# Patient Record
Sex: FEMALE | Race: WHITE | URBAN - METROPOLITAN AREA
[De-identification: names, ages, dates, MRNs, and addresses within clinical notes are randomized per-mention and may not be internally consistent; named-entity substitution may affect disease eponyms.]

---

## 2017-01-16 RX ORDER — POTASSIUM CHLORIDE 20 MEQ/1
20 TABLET, EXTENDED RELEASE ORAL 2 TIMES DAILY
Qty: 60 TABLET | Refills: 5 | Status: SHIPPED | OUTPATIENT
Start: 2017-01-16 | End: 2017-07-14 | Stop reason: SDUPTHER

## 2017-03-17 RX ORDER — PRAVASTATIN SODIUM 20 MG
20 TABLET ORAL DAILY
Qty: 90 TABLET | Refills: 1 | Status: SHIPPED | OUTPATIENT
Start: 2017-03-17 | End: 2017-09-11 | Stop reason: SDUPTHER

## 2017-03-17 RX ORDER — VENLAFAXINE HYDROCHLORIDE 75 MG/1
75 CAPSULE, EXTENDED RELEASE ORAL DAILY
Qty: 30 CAPSULE | Refills: 5 | Status: SHIPPED | OUTPATIENT
Start: 2017-03-17 | End: 2017-09-11 | Stop reason: SDUPTHER

## 2017-04-11 ENCOUNTER — OFFICE VISIT (OUTPATIENT)
Dept: INTERNAL MEDICINE CLINIC | Age: 47
End: 2017-04-11

## 2017-04-11 VITALS
RESPIRATION RATE: 12 BRPM | DIASTOLIC BLOOD PRESSURE: 73 MMHG | BODY MASS INDEX: 35.5 KG/M2 | HEIGHT: 61 IN | HEART RATE: 70 BPM | SYSTOLIC BLOOD PRESSURE: 115 MMHG | WEIGHT: 188 LBS

## 2017-04-11 DIAGNOSIS — E78.5 HYPERLIPIDEMIA, UNSPECIFIED HYPERLIPIDEMIA TYPE: ICD-10-CM

## 2017-04-11 DIAGNOSIS — R73.01 IMPAIRED FASTING BLOOD SUGAR: ICD-10-CM

## 2017-04-11 DIAGNOSIS — I10 BENIGN ESSENTIAL HTN: Primary | ICD-10-CM

## 2017-04-11 DIAGNOSIS — E66.9 OBESITY (BMI 30-39.9): ICD-10-CM

## 2017-04-11 DIAGNOSIS — E88.81 METABOLIC SYNDROME: ICD-10-CM

## 2017-04-11 DIAGNOSIS — F33.0 MAJOR DEPRESSIVE DISORDER, RECURRENT EPISODE, MILD (HCC): ICD-10-CM

## 2017-04-11 DIAGNOSIS — F41.9 ANXIETY: ICD-10-CM

## 2017-04-11 PROCEDURE — 99214 OFFICE O/P EST MOD 30 MIN: CPT | Performed by: INTERNAL MEDICINE

## 2017-04-12 LAB
A/G RATIO: 1.4 (ref 1.1–2.2)
ALBUMIN SERPL-MCNC: 4.2 G/DL (ref 3.4–5)
ALP BLD-CCNC: 52 U/L (ref 40–129)
ALT SERPL-CCNC: 18 U/L (ref 10–40)
ANION GAP SERPL CALCULATED.3IONS-SCNC: 15 MMOL/L (ref 3–16)
AST SERPL-CCNC: 18 U/L (ref 15–37)
BILIRUB SERPL-MCNC: 0.5 MG/DL (ref 0–1)
BUN BLDV-MCNC: 12 MG/DL (ref 7–20)
CALCIUM SERPL-MCNC: 9.4 MG/DL (ref 8.3–10.6)
CHLORIDE BLD-SCNC: 99 MMOL/L (ref 99–110)
CHOLESTEROL, TOTAL: 219 MG/DL (ref 0–199)
CO2: 26 MMOL/L (ref 21–32)
CREAT SERPL-MCNC: 0.6 MG/DL (ref 0.6–1.1)
ESTIMATED AVERAGE GLUCOSE: 119.8 MG/DL
GFR AFRICAN AMERICAN: >60
GFR NON-AFRICAN AMERICAN: >60
GLOBULIN: 2.9 G/DL
GLUCOSE BLD-MCNC: 97 MG/DL (ref 70–99)
HBA1C MFR BLD: 5.8 %
HDLC SERPL-MCNC: 49 MG/DL (ref 40–60)
LDL CHOLESTEROL CALCULATED: 138 MG/DL
POTASSIUM SERPL-SCNC: 3.9 MMOL/L (ref 3.5–5.1)
SODIUM BLD-SCNC: 140 MMOL/L (ref 136–145)
TOTAL PROTEIN: 7.1 G/DL (ref 6.4–8.2)
TRIGL SERPL-MCNC: 162 MG/DL (ref 0–150)
VLDLC SERPL CALC-MCNC: 32 MG/DL

## 2017-07-14 RX ORDER — POTASSIUM CHLORIDE 20 MEQ/1
20 TABLET, EXTENDED RELEASE ORAL 2 TIMES DAILY
Qty: 60 TABLET | Refills: 5 | Status: SHIPPED | OUTPATIENT
Start: 2017-07-14 | End: 2018-01-12 | Stop reason: SDUPTHER

## 2017-09-11 RX ORDER — VENLAFAXINE HYDROCHLORIDE 75 MG/1
75 CAPSULE, EXTENDED RELEASE ORAL DAILY
Qty: 30 CAPSULE | Refills: 0 | Status: SHIPPED | OUTPATIENT
Start: 2017-09-11 | End: 2017-10-13 | Stop reason: SDUPTHER

## 2017-09-11 RX ORDER — PRAVASTATIN SODIUM 20 MG
20 TABLET ORAL DAILY
Qty: 90 TABLET | Refills: 0 | Status: SHIPPED | OUTPATIENT
Start: 2017-09-11 | End: 2017-12-08 | Stop reason: SDUPTHER

## 2017-10-16 RX ORDER — HYDROCHLOROTHIAZIDE 25 MG/1
25 TABLET ORAL DAILY
Qty: 90 TABLET | Refills: 3 | Status: SHIPPED | OUTPATIENT
Start: 2017-10-16 | End: 2018-10-15 | Stop reason: SDUPTHER

## 2017-10-16 RX ORDER — HYDROCHLOROTHIAZIDE 25 MG/1
TABLET ORAL
Qty: 30 TABLET | Refills: 11 | Status: SHIPPED | OUTPATIENT
Start: 2017-10-16 | End: 2017-10-18 | Stop reason: SDUPTHER

## 2017-10-18 ENCOUNTER — OFFICE VISIT (OUTPATIENT)
Dept: INTERNAL MEDICINE CLINIC | Age: 47
End: 2017-10-18

## 2017-10-18 VITALS
BODY MASS INDEX: 34.41 KG/M2 | HEART RATE: 74 BPM | RESPIRATION RATE: 12 BRPM | WEIGHT: 187 LBS | SYSTOLIC BLOOD PRESSURE: 121 MMHG | HEIGHT: 62 IN | DIASTOLIC BLOOD PRESSURE: 82 MMHG

## 2017-10-18 DIAGNOSIS — E88.81 METABOLIC SYNDROME: ICD-10-CM

## 2017-10-18 DIAGNOSIS — Z12.11 SCREEN FOR COLON CANCER: ICD-10-CM

## 2017-10-18 DIAGNOSIS — Z00.00 ANNUAL PHYSICAL EXAM: Primary | ICD-10-CM

## 2017-10-18 DIAGNOSIS — I10 BENIGN ESSENTIAL HTN: ICD-10-CM

## 2017-10-18 DIAGNOSIS — Z80.0 FAMILY HISTORY OF COLON CANCER: ICD-10-CM

## 2017-10-18 DIAGNOSIS — R73.01 IMPAIRED FASTING BLOOD SUGAR: ICD-10-CM

## 2017-10-18 DIAGNOSIS — E78.5 HYPERLIPIDEMIA, UNSPECIFIED HYPERLIPIDEMIA TYPE: ICD-10-CM

## 2017-10-18 LAB
A/G RATIO: 1.4 (ref 1.1–2.2)
ALBUMIN SERPL-MCNC: 4.2 G/DL (ref 3.4–5)
ALP BLD-CCNC: 58 U/L (ref 40–129)
ALT SERPL-CCNC: 19 U/L (ref 10–40)
ANION GAP SERPL CALCULATED.3IONS-SCNC: 15 MMOL/L (ref 3–16)
AST SERPL-CCNC: 22 U/L (ref 15–37)
BASOPHILS ABSOLUTE: 0 K/UL (ref 0–0.2)
BASOPHILS RELATIVE PERCENT: 0.6 %
BILIRUB SERPL-MCNC: 0.6 MG/DL (ref 0–1)
BUN BLDV-MCNC: 9 MG/DL (ref 7–20)
CALCIUM SERPL-MCNC: 9.2 MG/DL (ref 8.3–10.6)
CHLORIDE BLD-SCNC: 98 MMOL/L (ref 99–110)
CHOLESTEROL, TOTAL: 205 MG/DL (ref 0–199)
CO2: 27 MMOL/L (ref 21–32)
CREAT SERPL-MCNC: 0.5 MG/DL (ref 0.6–1.1)
EOSINOPHILS ABSOLUTE: 0.1 K/UL (ref 0–0.6)
EOSINOPHILS RELATIVE PERCENT: 1.1 %
GFR AFRICAN AMERICAN: >60
GFR NON-AFRICAN AMERICAN: >60
GLOBULIN: 3.1 G/DL
GLUCOSE BLD-MCNC: 85 MG/DL (ref 70–99)
HCT VFR BLD CALC: 38.1 % (ref 36–48)
HDLC SERPL-MCNC: 49 MG/DL (ref 40–60)
HEMOGLOBIN: 12.9 G/DL (ref 12–16)
LDL CHOLESTEROL CALCULATED: 120 MG/DL
LYMPHOCYTES ABSOLUTE: 2.7 K/UL (ref 1–5.1)
LYMPHOCYTES RELATIVE PERCENT: 42.4 %
MCH RBC QN AUTO: 31.1 PG (ref 26–34)
MCHC RBC AUTO-ENTMCNC: 33.9 G/DL (ref 31–36)
MCV RBC AUTO: 91.8 FL (ref 80–100)
MONOCYTES ABSOLUTE: 0.4 K/UL (ref 0–1.3)
MONOCYTES RELATIVE PERCENT: 7 %
NEUTROPHILS ABSOLUTE: 3.1 K/UL (ref 1.7–7.7)
NEUTROPHILS RELATIVE PERCENT: 48.9 %
PDW BLD-RTO: 13.1 % (ref 12.4–15.4)
PLATELET # BLD: 214 K/UL (ref 135–450)
PMV BLD AUTO: 8.9 FL (ref 5–10.5)
POTASSIUM SERPL-SCNC: 3.6 MMOL/L (ref 3.5–5.1)
RBC # BLD: 4.15 M/UL (ref 4–5.2)
SODIUM BLD-SCNC: 140 MMOL/L (ref 136–145)
TOTAL PROTEIN: 7.3 G/DL (ref 6.4–8.2)
TRIGL SERPL-MCNC: 179 MG/DL (ref 0–150)
TSH SERPL DL<=0.05 MIU/L-ACNC: 1.55 UIU/ML (ref 0.27–4.2)
VLDLC SERPL CALC-MCNC: 36 MG/DL
WBC # BLD: 6.4 K/UL (ref 4–11)

## 2017-10-18 PROCEDURE — 93000 ELECTROCARDIOGRAM COMPLETE: CPT | Performed by: INTERNAL MEDICINE

## 2017-10-18 PROCEDURE — 99396 PREV VISIT EST AGE 40-64: CPT | Performed by: INTERNAL MEDICINE

## 2017-10-18 RX ORDER — VENLAFAXINE HYDROCHLORIDE 75 MG/1
CAPSULE, EXTENDED RELEASE ORAL
Qty: 30 CAPSULE | Refills: 11 | Status: SHIPPED | OUTPATIENT
Start: 2017-10-18 | End: 2018-10-18 | Stop reason: SDUPTHER

## 2017-10-18 NOTE — PATIENT INSTRUCTIONS
exposed to the sun to reduce the risk of skin cancer. 2. Continue a healthy lifestyle including a healthy diet and increased aerobic exercise  3. Update your eye exam every 2 years  4. Always wear a seatbelt  5. Always wear a helmet when riding a bike or motorcycle    Here are a few  Reliable websites with a variety of health and wellness information:   www.mylifecheck. heart. org     www.nutritionsource. org     www. americanheart. org     www. diabetes. org      www.menopause. org     www.[facility]     www.[employer] ([facility] site)      RETURN SOONER WITH NEW PROBLEMS      Patient Education        Colon Cancer Screening: Care Instructions  Your Care Instructions    Colorectal cancer occurs in the colon or rectum. That's the lower part of your digestive system. It is the second-leading cause of cancer deaths in the [place]. It often starts with small growths called polyps in the colon or rectum. Polyps are usually found with screening tests. Depending on the type of test, any polyps found may be removed during the tests. Colorectal cancer usually does not cause symptoms at first. But regular tests can help find it early, before it spreads and becomes harder to treat. Experts advise routine tests for colon cancer for people starting at age 48. And they advise people with a higher risk of colon cancer to get tested sooner. Talk with your doctor about when you should start testing. Discuss which tests you need. Follow-up care is a key part of your treatment and safety. Be sure to make and go to all appointments, and call your doctor if you are having problems. It's also a good idea to know your test results and keep a list of the medicines you take. What are the main screening tests for colon cancer? · Stool tests. These include the fecal immunochemical test (FIT) and the fecal occult blood test (FOBT). These tests check stool samples for signs of cancer.  If your test is positive, you will need to have a colonoscopy. · Sigmoidoscopy. This test lets your doctor look at the lining of your rectum and the lowest part of your colon. Your doctor uses a lighted tube called a sigmoidoscope. This test can't find cancers or polyps in the upper part of your colon. In some cases, polyps that are found can be removed. But if your doctor finds polyps, you will need to have a colonoscopy to check the upper part of your colon. · Colonoscopy. This test lets your doctor look at the lining of your rectum and your entire colon. The doctor uses a thin, flexible tool called a colonoscope. It can also be used to remove polyps or get a tissue sample (biopsy). What tests do you need? The following guidelines are for people age 48 and over who are not at high risk for colorectal cancer. You may have at least one of these tests as directed by your doctor. · Fecal immunochemical test (FIT) or fecal occult blood test (FOBT) every year  · Sigmoidoscopy every 5 years  · Colonoscopy every 10 years  If you are age 68 to 80, you can work with your doctor to decide if screening is a good option. If you are age 80 or older, your doctor will likely advise that screening is not helpful. Talk with your doctor about when you need to be tested. And discuss which tests are right for you. Your doctor may recommend earlier or more frequent testing if you:  · Have had colorectal cancer before. · Have had colon polyps. · Have symptoms of colorectal cancer. These include blood in your stool and changes in your bowel habits. · Have a parent, brother or sister, or child with colon polyps or colorectal cancer. · Have a bowel disease. This includes ulcerative colitis and Crohn's disease. · Have a rare polyp syndrome that runs in families, such as familial adenomatous polyposis (FAP). · Have had radiation treatments to the belly or pelvis. When should you call for help?   Watch closely for changes in your health, and be sure to contact your doctor if:  · You have any changes in your bowel habits. · You have any problems. Where can you learn more? Go to https://chpepiceweb.Metagenics. org and sign in to your [employer] account. Enter 989 [id number] in the [facility] box to learn more about \"Colon Cancer Screening: Care Instructions. \"     If you do not have an account, please click on the \"Sign Up Now\" link. Current as of: May 3, 2017  Content Version: 11.3  © 5651-9239 [employer]nergos. Care instructions adapted under license by [facility] ([facility]). If you have questions about a medical condition or this instruction, always ask your healthcare professional. [address] any warranty or liability for your use of this information. Patient Education        DASH Diet: Care Instructions  Your Care Instructions  The DASH diet is an eating plan that can help lower your blood pressure. DASH stands for Dietary Approaches to Stop Hypertension. Hypertension is high blood pressure. The DASH diet focuses on eating foods that are high in calcium, potassium, and magnesium. These nutrients can lower blood pressure. The foods that are highest in these nutrients are fruits, vegetables, low-fat dairy products, nuts, seeds, and legumes. But taking calcium, potassium, and magnesium supplements instead of eating foods that are high in those nutrients does not have the same effect. The DASH diet also includes whole grains, fish, and poultry. The DASH diet is one of several lifestyle changes your doctor may recommend to lower your high blood pressure. Your doctor may also want you to decrease the amount of sodium in your diet. Lowering sodium while following the DASH diet can lower blood pressure even further than just the DASH diet alone. Follow-up care is a key part of your treatment and safety. Be sure to make and go to all appointments, and call your doctor if you are having problems.  It's also a good idea to know your test results following:  ¨ Make it a goal to eat a fruit or vegetable at every meal and at snacks. This will make it easy to get the recommended amount of fruits and vegetables each day. ¨ Try yogurt topped with fruit and nuts for a snack or healthy dessert. ¨ Add lettuce, tomato, cucumber, and onion to sandwiches. ¨ Combine a ready-made pizza crust with low-fat mozzarella cheese and lots of vegetable toppings. Try using tomatoes, squash, spinach, broccoli, carrots, cauliflower, and onions. ¨ Have a variety of cut-up vegetables with a low-fat dip as an appetizer instead of chips and dip. ¨ Sprinkle sunflower seeds or chopped almonds over salads. Or try adding chopped walnuts or almonds to cooked vegetables. ¨ Try some vegetarian meals using beans and peas. Add garbanzo or kidney beans to salads. Make burritos and tacos with mashed marinelli beans or black beans. Where can you learn more? Go to https://Mc4peYouLike.PIE Software. org and sign in to your [employer] account. Enter Z309 in the [employer] box to learn more about \"DASH Diet: Care Instructions. \"     If you do not have an account, please click on the \"Sign Up Now\" link. Current as of: April 3, 2017  Content Version: 11.3  © 0161-7478 WeFi. Care instructions adapted under license by [facility] ([facility]). If you have questions about a medical condition or this instruction, always ask your healthcare professional. [place] [address] any warranty or liability for your use of this information. Patient Education        Diet and Exercise for Metabolic Syndrome: Care Instructions  Your Care Instructions  Metabolic syndrome is the name for a group of health problems. It includes having too much fat around your waist and high blood pressure. It also includes high triglycerides, high blood sugar, and low levels of healthy (HDL) cholesterol.  These problems make it more likely you will have a heart attack or stroke or get diabetes. Your family history (your genes) can cause metabolic syndrome. So can unhealthy eating habits and not getting enough exercise. You can help lower your risk of heart attack, stroke, and diabetes if you eat healthy foods and get more exercise. It may be hard to make these lifestyle changes. But even small changes can help. Follow-up care is a key part of your treatment and safety. Be sure to make and go to all appointments, and call your doctor if you are having problems. It's also a good idea to know your test results and keep a list of the medicines you take. How can you care for yourself at home? Eat more fruits and vegetables  · Fruits and vegetables have nutrients to help protect you from heart disease and high blood pressure. They are low in fat and high in fiber. Dark green, orange, and yellow ones are the healthiest.  · Keep lots of vegetables ready for snacks. · Buy fruit that is in season. Then put it where you can see it so you will want to eat it. · Cook dishes that have a lot of vegetables. Soups and stir-fries are good choices. Limit saturated and trans fats  · Read food labels, and try to avoid saturated and trans fats. They increase your risk of heart disease. · Use olive or canola oil when you cook. · Bake, broil, grill, or steam foods. Avoid fried foods. · Limit how much high-fat meat you eat. This includes hot dogs and sausages. When you prepare meat, cut off all the fat. · You can replace high-fat meat with fish and skinless poultry. You can also try products made from soybeans, like tofu. Soybeans may be very good for your heart. · Eat at least 2 servings of fish a week. Fish with omega-3 fatty acids may help reduce your risk of heart attack. [place] and sardines are good examples. · Choose low-fat or fat-free milk and dairy products. Eat foods high in fiber  · Foods high in fiber may reduce your cholesterol. And they may give you important vitamins and minerals.  Good examples are oatmeal, cooked dried beans, brown rice, citrus fruits, and apples. · Eat whole-grain breads and cereals. They have more fiber than white bread or pastries. Limit high-sugar foods  · Limit foods and drinks that are high in sugar. Some examples are soda pop, sugar-sweetened fruit drinks, candy, and many desserts. · Limit the amount of sugar, honey, and other sweeteners that you add to food and drinks. · Choose water instead of soda pop or other sugar-sweetened drinks. · Limit fruit juice. Limit salt and sodium  · You can help lower your blood pressure if you limit salt and sodium. · If you take the salt shaker off the table, it may be easier to use less salt. You can also try using half the salt in a recipe. And you can avoid adding salt to cooking water for pasta, rice, and potatoes. · Try to eat fewer snacks, fast foods, and other high-salt, processed foods. Check labels so you know how much sodium a food has. · Choose canned goods (soups, vegetables, and beans) that are low in sodium. Get regular exercise  · Get more exercise. Make sure your doctor knows when you start a new exercise program. Even small amounts of exercise will help you get stronger and have more energy. It can also help you manage your weight and your stress. · Walking is a good choice. Little by little, increase the amount you walk every day. Try for at least 30 minutes on most days of the week. Where can you learn more? Go to https://Rocketickcharmaine.Microstrip Planar Antennas. org and sign in to your [employer] account. Enter 722 [id number] in the [facility] box to learn more about \"Diet and Exercise for Metabolic Syndrome: Care Instructions. \"     If you do not have an account, please click on the \"Sign Up Now\" link. Current as of: July 26, 2016  Content Version: 11.3  © 2392-7955 readness.com, Incorporated. Care instructions adapted under license by [facility] ([facility]).  If you have questions about a medical condition or this instruction, prescribed medicines, take them exactly as prescribed. Call your doctor if you think you are having a problem with your medicine. You will get more details on the specific medicines your doctor prescribes. When should you call for help? Watch closely for changes in your health, and be sure to contact your doctor if:  · You have any symptoms of diabetes. These may include:  ¨ Being thirsty more often. ¨ Urinating more. ¨ Being hungrier. ¨ Losing weight. ¨ Being very tired. ¨ Having blurry vision. · You have a wound that will not heal.  · You have an infection that will not go away. · You have problems with your blood pressure. · You want more information about diabetes and how you can keep from getting it. Where can you learn more? Go to https://Sellsy.Jacket Micro Devices. org and sign in to your [employer] account. Enter I222 in the [facility] box to learn more about \"Prediabetes: Care Instructions. \"     If you do not have an account, please click on the \"Sign Up Now\" link. Current as of: March 13, 2017  Content Version: 11.3  © 9668-5469 OrthoFi. Care instructions adapted under license by [facility] ([facility]). If you have questions about a medical condition or this instruction, always ask your healthcare professional. [place] [address] any warranty or liability for your use of this information. Patient Education        Well Visit, Ages 25 to 48: Care Instructions  Your Care Instructions  Physical exams can help you stay healthy. Your doctor has checked your overall health and may have suggested ways to take good care of yourself. He or she also may have recommended tests. At home, you can help prevent illness with healthy eating, regular exercise, and other steps. Follow-up care is a key part of your treatment and safety. Be sure to make and go to all appointments, and call your doctor if you are having problems.  It's also a good idea to know your test results and keep a list of the medicines you take. How can you care for yourself at home? · Reach and stay at a healthy weight. This will lower your risk for many problems, such as obesity, diabetes, heart disease, and high blood pressure. · Get at least 30 minutes of physical activity on most days of the week. Walking is a good choice. You also may want to do other activities, such as running, swimming, cycling, or playing tennis or team sports. Discuss any changes in your exercise program with your doctor. · Do not smoke or allow others to smoke around you. If you need help quitting, talk to your doctor about stop-smoking programs and medicines. These can increase your chances of quitting for good. · Talk to your doctor about whether you have any risk factors for sexually transmitted infections (STIs). Having one sex partner (who does not have STIs and does not have sex with anyone else) is a good way to avoid these infections. · Use birth control if you do not want to have children at this time. Talk with your doctor about the choices available and what might be best for you. · Protect your skin from too much sun. When you're outdoors from 10 a.m. to 4 p.m., stay in the shade or cover up with clothing and a hat with a wide brim. Wear sunglasses that block UV rays. Even when it's cloudy, put broad-spectrum sunscreen (SPF 30 or higher) on any exposed skin. · See a dentist one or two times a year for checkups and to have your teeth cleaned. · Wear a seat belt in the car. · Drink alcohol in moderation, if at all. That means no more than 2 drinks a day for men and 1 drink a day for women. Follow your doctor's advice about when to have certain tests. These tests can spot problems early. For everyone  · Cholesterol. Have the fat (cholesterol) in your blood tested after age 21.  Your doctor will tell you how often to have this done based on your age, family history, or other things that can increase your risk for heart disease. · Blood pressure. Have your blood pressure checked during a routine doctor visit. Your doctor will tell you how often to check your blood pressure based on your age, your blood pressure results, and other factors. · Vision. Talk with your doctor about how often to have a glaucoma test.  · Diabetes. Ask your doctor whether you should have tests for diabetes. · Colon cancer. Have a test for colon cancer at age 48. You may have one of several tests. If you are younger than 48, you may need a test earlier if you have any risk factors. Risk factors include whether you already had a precancerous polyp removed from your colon or whether your parent, brother, sister, or child has had colon cancer. For women  · Breast exam and mammogram. Talk to your doctor about when you should have a clinical breast exam and a mammogram. Medical experts differ on whether and how often women under 50 should have these tests. Your doctor can help you decide what is right for you. · Pap test and pelvic exam. Begin Pap tests at age 24. A Pap test is the best way to find cervical cancer. The test often is part of a pelvic exam. Ask how often to have this test.  · Tests for sexually transmitted infections (STIs). Ask whether you should have tests for STIs. You may be at risk if you have sex with more than one person, especially if your partners do not wear condoms. For men  · Tests for sexually transmitted infections (STIs). Ask whether you should have tests for STIs. You may be at risk if you have sex with more than one person, especially if you do not wear a condom. · Testicular cancer exam. Ask your doctor whether you should check your testicles regularly. · Prostate exam. Talk to your doctor about whether you should have a blood test (called a PSA test) for prostate cancer.  Experts differ on whether and when men should have this test. Some experts suggest it if you are older than 39 and are -American or have a father or brother who got prostate cancer when he was younger than 72. When should you call for help? Watch closely for changes in your health, and be sure to contact your doctor if you have any problems or symptoms that concern you. Where can you learn more? Go to https://chcharmaine.Bee Networx (Astilbe). org and sign in to your [employer] account. Enter P072 in the [employer] box to learn more about \"Well Visit, Ages 25 to 48: Care Instructions. \"     If you do not have an account, please click on the \"Sign Up Now\" link. Current as of: July 19, 2016  Content Version: 11.3  © 4181-8622 SilkStart, InteliVideo. Care instructions adapted under license by [facility] ([facility]). If you have questions about a medical condition or this instruction, always ask your healthcare professional. [address] any warranty or liability for your use of this information.

## 2017-10-18 NOTE — PROGRESS NOTES
 Heart Failure Maternal Grandmother     Cancer Maternal Grandfather     Heart Disease Paternal Grandmother     Heart Failure Paternal Grandmother               REVIEW OF SYSTEMS:    CONSTITUTIONAL:  Neg   Recent weight changes, fatigue, fever, chills or night sweats, anorexia, Sleep difficulties  EYES: Neg  Blurry vision, loss of vision, double vision, tearing, itching, eye pain. EARS:  Neg Hearing loss, tinnitus, vertigo, discharge or otalgia. NOSE:  Neg  Rhinorrhea, sneezing, itching, allergy, epistaxis, or snoring  MOUTH/THROAT:  Neg Bleeding gums, hoarseness, sore throat, dysphagia, throat infections, or dentures  RESPIRATORY:  Neg SOB ,wheeze, cough, sputum, hemoptysis. No report of + TB test.    CARDIOVASCULAR:  Neg Chest pain, palpitations, heart murmur, dyspnea on exertion, orthopnea, paroxysmal nocturnal dyspnea or edema of extremities, or claudication  GASTROINTESTINAL:  Neg   Nausea, vomiting, or diarrhea, constipation, hematemesis, heart burn, dysphagia,change in bowel movements or stool caliber, hematochezia, melena, abdominal pain, or food intolerance. Colonoscopy: No  GENITOURINARY:  Neg  Urinary frequency, hesitancy, urgency, polyuria, dysuria, hematuria, nocturia, incontinence, change in stream, genital pain or swelling, kidney stones, STD's. PAP/NAOMIE: Yes, been no longer has Pap smears due to hysterectomy. Cervix was removed. HEMATOLOGIC/LYMPHATIC:  Neg  Anemia, bleeding dyscrasias, easy bruising, blood clots (DVT/PE), transfusions, or enlarged lymph nodes  MUSCULOSKELETAL:  Neg  New myalgias, bone pain, joint pain, joint swelling, low back pain, neck pain, radicular pain, or fractures. NEUROLOGICAL:  Neg  Loss of Consciousness, memeory loss or forgetfulness, confusion, difficulty concentrating, seizures, insomina, aphasia or dysarthria unilateral weakness or  ataxia, headaches, syncope, tremor, or H/O head trauma.  + Right hand paresthesias.     PSYCHIATRIC:  Neg  Depression, anxiety, personality changes, nervousness, drug or alcohol use/abuse, H/O psych counseling: Yes, Psychotropics: Yes, Effexor XR  SKIN :  Neg  Rash, nail changes, sun burns, tattoos, change in moles, or skin color changes  ENDOCRINE:  Neg  Polydipsia,polyuria,abnormal weight changes,heat /cold intolerance, Hair changes. No H/O Diabetes or Thyroid disease. Preventive Care:    Health Maintenance   Topic Date Due    Flu vaccine (1) 09/01/2017    Lipid screen  04/11/2022    DTaP/Tdap/Td vaccine (2 - Td) 09/05/2024    HIV screen  Completed      Hx abnormal PAP: Once. Sexual activity: single partner, contraception - status post hysterectomy   Self-breast exams: yes  Last eye exam: 8/2017, normal, glasses  Exercise: no regular exercise  Seatbelt use: Y  Sunscreen use:Y  Dentist: UTD, every 6 months    Physical Exam    /82   Pulse 74   Resp 12   Ht 5' 2\" (1.575 m)   Wt 187 lb (84.8 kg)   LMP 05/06/2011   BMI 34.20 kg/m²     Wt Readings from Last 3 Encounters:   10/18/17 187 lb (84.8 kg)   04/11/17 188 lb (85.3 kg)   10/07/16 185 lb (83.9 kg)       GEN:  40 y.o. female who is in NAD, A&O. She appears stated age and well nourished. She is cooperative and pleasant. HEAD:  NC/AT, no lesions. EYES:  SASHA, EOMI, No scleral icterus or conjunctival injection or discharge. Visual fields in tact to confrontation. Fundoscopic (non-dilated) grossly normal.  Disc margins well demarcated. EARS:  EAC's clear, TM's normal.  NOSE:  Nasal cavity is clear. No mucosal congestion or discharge. Sinuses are nontender. MOUTH & THROAT:  Oral cavity is clear without mucosal lesions. Tongue is midline. Dentition is in good repair. No pharyngeal erythema or exudate. NECK:  Supple. Full ROM. Trachea is midline. No increased JVD. No thyromegaly or nodules. No masses  LYMPH: No C/SC/A/F nodes  CARDIAC:  S1S2 NL. Regular rhythm. No murmur/clicks/rubs. No ectopy. PMI is non-displaced. VASC:  Pedal pulses 2/4. Hepatitis C screening recommended for those born between 1945 and 1965-- not on file   Clinically not indicated   HIV screening recommended for those ages 12-76 NO MATTER THE RISK FOR HIV--  10/7/2016 No need to repeat at this time   Aspirin for Cardiovascular Prevention   No Not indicated   Weight: Body mass index is 34.2 kg/m². 5' 2\" (1.575 m)187 lb (84.8 kg)    Your BMI is 25 or greater, which indicates that you are overweight    Recommended Immunizations    Immunization History   Administered Date(s) Administered    Influenza Virus Vaccine 11/05/2010, 09/24/2013, 09/05/2014, 10/05/2015    PPD Test 01/31/2006    Tdap (Boostrix, Adacel) 09/05/2014    Tetanus 01/31/2006        Influenza vaccine:  recommended every fall-- DECLINED. Pneumonia vaccine: Due at age 72    Tetanus vaccine:  tetanus and diptheria/Pertussis vaccine (Td/Tdap) recommended every 10 years- Td due 2024     Shingles vaccine:  recommended as a one time dose after the age of 61         Additional Recommendations   1. Use Sunscreen daily when exposed to the sun to reduce the risk of skin cancer. 2. Continue a healthy lifestyle including a healthy diet and increased aerobic exercise  3. Update your eye exam every 2 years  4. Always wear a seatbelt  5. Always wear a helmet when riding a bike or motorcycle    Here are a few  Reliable websites with a variety of health and wellness information:   www.mylifecheck. heart. org     www.nutritionsource. org     www. americanheart. org     www. diabetes. org      www.menopause. org     www.[facility]     [email or web address])

## 2017-10-19 LAB
ESTIMATED AVERAGE GLUCOSE: 125.5 MG/DL
HBA1C MFR BLD: 6 %

## 2017-12-08 RX ORDER — PRAVASTATIN SODIUM 20 MG
20 TABLET ORAL DAILY
Qty: 90 TABLET | Refills: 1 | Status: SHIPPED | OUTPATIENT
Start: 2017-12-08 | End: 2018-06-13 | Stop reason: SDUPTHER

## 2017-12-08 RX ORDER — LOSARTAN POTASSIUM 50 MG/1
50 TABLET ORAL DAILY
Qty: 30 TABLET | Refills: 5 | Status: SHIPPED | OUTPATIENT
Start: 2017-12-08 | End: 2018-01-18 | Stop reason: DRUGHIGH

## 2018-01-12 RX ORDER — POTASSIUM CHLORIDE 20 MEQ/1
20 TABLET, EXTENDED RELEASE ORAL 2 TIMES DAILY
Qty: 60 TABLET | Refills: 5 | Status: SHIPPED | OUTPATIENT
Start: 2018-01-12 | End: 2019-06-29 | Stop reason: SDUPTHER

## 2018-01-12 NOTE — TELEPHONE ENCOUNTER
From: Ezra Alejo  Sent: 1/12/2018 11:06 AM EST  Subject: Medication Renewal Request    Arelis Hernandez would like a refill of the following medications:  potassium chloride (KLOR-CON M) 20 MEQ extended release tablet [Jb Sparks, ]    Preferred pharmacy: [address] [phone] - F [phone]    Comment:

## 2018-01-18 ENCOUNTER — OFFICE VISIT (OUTPATIENT)
Dept: INTERNAL MEDICINE CLINIC | Age: 48
End: 2018-01-18

## 2018-01-18 VITALS
BODY MASS INDEX: 33.84 KG/M2 | DIASTOLIC BLOOD PRESSURE: 90 MMHG | SYSTOLIC BLOOD PRESSURE: 132 MMHG | HEART RATE: 84 BPM | RESPIRATION RATE: 12 BRPM | WEIGHT: 185 LBS

## 2018-01-18 DIAGNOSIS — F43.9 STRESS: ICD-10-CM

## 2018-01-18 DIAGNOSIS — I10 BENIGN ESSENTIAL HTN: Primary | ICD-10-CM

## 2018-01-18 DIAGNOSIS — M54.2 NECK PAIN: ICD-10-CM

## 2018-01-18 PROCEDURE — 99213 OFFICE O/P EST LOW 20 MIN: CPT | Performed by: INTERNAL MEDICINE

## 2018-01-18 RX ORDER — LOSARTAN POTASSIUM 50 MG/1
100 TABLET ORAL DAILY
Qty: 30 TABLET | Refills: 5 | Status: SHIPPED | OUTPATIENT
Start: 2018-01-18 | End: 2018-01-29 | Stop reason: SDUPTHER

## 2018-01-18 RX ORDER — MECLIZINE HYDROCHLORIDE 25 MG/1
25 TABLET ORAL 3 TIMES DAILY PRN
COMMUNITY
End: 2020-04-27 | Stop reason: DRUGHIGH

## 2018-01-18 NOTE — PATIENT INSTRUCTIONS
Patient Education        Neck Pain: Care Instructions  Your Care Instructions    You can have neck pain anywhere from the bottom of your head to the top of your shoulders. It can spread to the upper back or arms. Injuries, painting a ceiling, sleeping with your neck twisted, staying in one position for too long, and many other activities can cause neck pain. Most neck pain gets better with home care. Your doctor may recommend medicine to relieve pain or relax your muscles. He or she may suggest exercise and physical therapy to increase flexibility and relieve stress. You may need to wear a special (cervical) collar to support your neck for a day or two. Follow-up care is a key part of your treatment and safety. Be sure to make and go to all appointments, and call your doctor if you are having problems. It's also a good idea to know your test results and keep a list of the medicines you take. How can you care for yourself at home? · Try using a heating pad on a low or medium setting for 15 to 20 minutes every 2 or 3 hours. Try a warm shower in place of one session with the heating pad. · You can also try an ice pack for 10 to 15 minutes every 2 to 3 hours. Put a thin cloth between the ice and your skin. · Take pain medicines exactly as directed. ¨ If the doctor gave you a prescription medicine for pain, take it as prescribed. ¨ If you are not taking a prescription pain medicine, ask your doctor if you can take an over-the-counter medicine. · If your doctor recommends a cervical collar, wear it exactly as directed. When should you call for help? Call your doctor now or seek immediate medical care if:  ? · You have new or worsening numbness in your arms, buttocks or legs. ? · You have new or worsening weakness in your arms or legs. (This could make it hard to stand up.)   ? · You lose control of your bladder or bowels. ? Watch closely for changes in your health, and be sure to contact your doctor if:  ? your hand to gently and steadily pull your head forward on the diagonal.  3. Repeat 2 to 4 times toward each side. Dorsal glide stretch    The dorsal glide stretches the back of the neck. If you feel pain, do not glide so far back. Some people find this exercise easier to do while lying on their backs with an ice pack on the neck. 1. Sit or stand tall and look straight ahead. 2. Slowly tuck your chin as you glide your head backward over your body  3. Hold for a count of 6, and then relax for up to 10 seconds. 4. Repeat 8 to 12 times. Chest and shoulder stretch    1. Sit or stand tall and glide your head backward as in the dorsal glide stretch. 2. Raise both arms so that your hands are next to your ears. 3. Take a deep breath, and as you breathe out, lower your elbows down and behind your back. You will feel your shoulder blades slide down and together, and at the same time you will feel a stretch across your chest and the front of your shoulders. 4. Hold for about 6 seconds, and then relax for up to 10 seconds. 5. Repeat 8 to 12 times. Strengthening: Hands on head    1. Move your head backward, forward, and side to side against gentle pressure from your hands, holding each position for about 6 seconds. 2. Repeat 8 to 12 times. Follow-up care is a key part of your treatment and safety. Be sure to make and go to all appointments, and call your doctor if you are having problems. It's also a good idea to know your test results and keep a list of the medicines you take. Where can you learn more? Go to [email or web address] and sign in to your Cozy account. Enter P975 in the [facility] box to learn more about \"Neck: Exercises. \"     If you do not have an account, please click on the \"Sign Up Now\" link. Current as of: March 21, 2017  Content Version: 11.5  © 5458-5046 Healthwise, Incorporated. Care instructions adapted under license by [facility] ([facility]).  If you have questions appetizer instead of chips and dip. ¨ Sprinkle sunflower seeds or chopped almonds over salads. Or try adding chopped walnuts or almonds to cooked vegetables. ¨ Try some vegetarian meals using beans and peas. Add garbanzo or kidney beans to salads. Make burritos and tacos with mashed marinelli beans or black beans. Where can you learn more? Go to [email or web address] and sign in to your [employer] account. Enter O226 in the [employer] box to learn more about \"DASH Diet: Care Instructions. \"     If you do not have an account, please click on the \"Sign Up Now\" link. Current as of: September 21, 2016  Content Version: 11.5  © 5356-4130 [employer]. Care instructions adapted under license by [facility] ([facility]). If you have questions about a medical condition or this instruction, always ask your healthcare professional. [place] [address] any warranty or liability for your use of this information. Patient Education        Learning About High Blood Pressure  What is high blood pressure? Blood pressure is a measure of how hard the blood pushes against the walls of your arteries. It's normal for blood pressure to go up and down throughout the day, but if it stays up, you have high blood pressure. Another name for high blood pressure is hypertension. Two numbers tell you your blood pressure. The first number is the systolic pressure. It shows how hard the blood pushes when your heart is pumping. The second number is the diastolic pressure. It shows how hard the blood pushes between heartbeats, when your heart is relaxed and filling with blood. A blood pressure of less than 120/80 (say \"120 over 80\") is ideal for an adult. High blood pressure is 140/90 or higher. You have high blood pressure if your top number is 140 or higher or your bottom number is 90 or higher, or both. Many people fall into the category in between, called prehypertension.  People with prehypertension need to make lifestyle changes to bring their blood pressure down and help prevent or delay high blood pressure. What happens when you have high blood pressure? · Blood flows through your arteries with too much force. Over time, this damages the walls of your arteries. But you can't feel it. High blood pressure usually doesn't cause symptoms. · Fat and calcium start to build up in your arteries. This buildup is called plaque. Plaque makes your arteries narrower and stiffer. Blood can't flow through them as easily. · This lack of good blood flow starts to damage some of the organs in your body. This can lead to problems such as coronary artery disease and heart attack, heart failure, stroke, kidney failure, and eye damage. How can you prevent high blood pressure? · Stay at a healthy weight. · Try to limit how much sodium you eat to less than 2,300 milligrams (mg) a day. If you limit your sodium to 1,500 mg a day, you can lower your blood pressure even more. ¨ Buy foods that are labeled \"unsalted,\" \"sodium-free,\" or \"low-sodium. \" Foods labeled \"reduced-sodium\" and \"light sodium\" may still have too much sodium. ¨ Flavor your food with garlic, lemon juice, onion, vinegar, herbs, and spices instead of salt. Do not use soy sauce, steak sauce, onion salt, garlic salt, mustard, or ketchup on your food. ¨ Use less salt (or none) when recipes call for it. You can often use half the salt a recipe calls for without losing flavor. · Be physically active. Get at least 30 minutes of exercise on most days of the week. Walking is a good choice. You also may want to do other activities, such as running, swimming, cycling, or playing tennis or team sports. · Limit alcohol to 2 drinks a day for men and 1 drink a day for women. · Eat plenty of fruits, vegetables, and low-fat dairy products. Eat less saturated and total fats. How is high blood pressure treated?   · Your doctor will suggest making lifestyle changes. For example, your doctor may ask you to eat healthy foods, quit smoking, lose extra weight, and be more active. · If lifestyle changes don't help enough or your blood pressure is very high, you will have to take medicine every day. Follow-up care is a key part of your treatment and safety. Be sure to make and go to all appointments, and call your doctor if you are having problems. It's also a good idea to know your test results and keep a list of the medicines you take. Where can you learn more? Go to [email or web address]://[email or web address] and sign in to your Get[employer] account. Enter P501 in the [employer] box to learn more about \"Learning About High Blood Pressure. \"     If you do not have an account, please click on the \"Sign Up Now\" link. Current as of: September 21, 2016  Content Version: 11.5  © 9698-7010 [employer]. Care instructions adapted under license by [facility] ([facility]). If you have questions about a medical condition or this instruction, always ask your healthcare professional. [place] [address] any warranty or liability for your use of this information. Patient Education        Learning About Stress  What is stress? Stress is what you feel when you have to handle more than you are used to. Stress is a fact of life for most people, and it affects everyone differently. What causes stress for you may not be stressful for someone else. A lot of things can cause stress. You may feel stress when you go on a job interview, take a test, or run a race. This kind of short-term stress is normal and even useful. It can help you if you need to work hard or react quickly. For example, stress can help you finish an important job on time. Stress also can last a long time. Long-term stress is caused by stressful situations or events. Examples of long-term stress include long-term health problems, ongoing problems at work, or conflicts in your family. stress is caused by stressful situations or events. Examples of long-term stress include long-term health problems, ongoing problems at work, and conflicts in your family. Long-term stress can harm your health. When you have anxiety or stress in your life, one of the ways your body responds is with muscle tension. Progressive muscle relaxation is a method that helps relieve that tension. How does progressive muscle relaxation reduce stress? The body responds to stress with muscle tension, which can cause pain or discomfort. In turn, tense muscles relay to the body that it's stressed. That keeps the cycle of stress and muscle tension going. Progressive muscle relaxation helps break this cycle by reducing muscle tension and general mental anxiety. This method often helps people get to sleep. How do you do progressive muscle relaxation? To do progressive muscle relaxation, first you tense a group of muscles as you breathe in. Then you relax them as you breathe out. Notice how your muscles feel when you relax them. You work on your muscle groups in a certain order. Through practice, you can learn to feel the difference between a tensed muscle and a relaxed muscle. Then you can learn how to turn on this relaxed state at the first sign of a muscle tensing up due to stress. Practicing this method for a few weeks will help you get better at this skill. In time you'll be able to use this method to relieve stress. When you first start, it may help to use an audio recording until you learn all the muscle groups in order. Check online for these recordings. Choose a place where you won't be interrupted. It should have space for you to lie down on your back and stretch out comfortably, such as on a carpeted floor. Follow-up care is a key part of your treatment and safety. Be sure to make and go to all appointments, and call your doctor if you are having problems.  It's also a good idea to know your test results and keep a list of the medicines you take. Where can you learn more? Go to [email or web address] [email or web address] and sign in to your [email or web address] account. Enter B916 in the [employer] box to learn more about \"Learning About Progressive Muscle Relaxation for Stress. \"     If you do not have an account, please click on the \"Sign Up Now\" link. Current as of: October 10, 2017  Content Version: 11.5  © 1403-0960 Healthwise, Incorporated. Care instructions adapted under license by [facility] ([facility]). If you have questions about a medical condition or this instruction, always ask your healthcare professional. [address] any warranty or liability for your use of this information.

## 2018-01-18 NOTE — PROGRESS NOTES
the upper cervical spine. ASSESSMENT[de-identified]  [facility] was seen today for hypertension. Diagnoses and all orders for this visit:    Benign essential HTN  -     losartan (COZAAR) 50 MG tablet; Take 2 tablets by mouth daily    Stress    Neck pain        Additional Plan:  1. Patient provided literature for home neck stretching exercises as well as stress management techniques. 2.  Cautioned patient on symptoms of hypotension. Discussed medications with patient who voiced understanding of their use, indication and potential side effects. Pt also understands the above recommendations. All questions answered.

## 2018-01-29 DIAGNOSIS — I10 BENIGN ESSENTIAL HTN: ICD-10-CM

## 2018-01-29 RX ORDER — LOSARTAN POTASSIUM 50 MG/1
100 TABLET ORAL DAILY
Qty: 60 TABLET | Refills: 5 | Status: SHIPPED | OUTPATIENT
Start: 2018-01-29 | End: 2018-02-15 | Stop reason: CLARIF

## 2018-02-15 ENCOUNTER — OFFICE VISIT (OUTPATIENT)
Dept: INTERNAL MEDICINE CLINIC | Age: 48
End: 2018-02-15

## 2018-02-15 VITALS
SYSTOLIC BLOOD PRESSURE: 132 MMHG | BODY MASS INDEX: 34.02 KG/M2 | HEART RATE: 86 BPM | WEIGHT: 186 LBS | DIASTOLIC BLOOD PRESSURE: 82 MMHG

## 2018-02-15 DIAGNOSIS — I10 BENIGN ESSENTIAL HTN: Primary | ICD-10-CM

## 2018-02-15 DIAGNOSIS — E66.9 OBESITY (BMI 30-39.9): ICD-10-CM

## 2018-02-15 PROCEDURE — 99213 OFFICE O/P EST LOW 20 MIN: CPT | Performed by: INTERNAL MEDICINE

## 2018-02-15 RX ORDER — LOSARTAN POTASSIUM 100 MG/1
100 TABLET ORAL DAILY
Qty: 30 TABLET | Refills: 5 | Status: SHIPPED | OUTPATIENT
Start: 2018-02-15 | End: 2018-10-18

## 2018-02-15 NOTE — PATIENT INSTRUCTIONS
Patient Education        Starting a Weight Loss Plan: Care Instructions  Your Care Instructions    If you are thinking about losing weight, it can be hard to know where to start. Your doctor can help you set up a weight loss plan that best meets your needs. You may want to take a class on nutrition or exercise, or join a weight loss support group. If you have questions about how to make changes to your eating or exercise habits, ask your doctor about seeing a registered dietitian or an exercise specialist.  It can be a big challenge to lose weight. But you do not have to make huge changes at once. Make small changes, and stick with them. When those changes become habit, add a few more changes. If you do not think you are ready to make changes right now, try to pick a date in the future. Make an appointment to see your doctor to discuss whether the time is right for you to start a plan. Follow-up care is a key part of your treatment and safety. Be sure to make and go to all appointments, and call your doctor if you are having problems. It's also a good idea to know your test results and keep a list of the medicines you take. How can you care for yourself at home? · Set realistic goals. Many people expect to lose much more weight than is likely. A weight loss of 5% to 10% of your body weight may be enough to improve your health. · Get family and friends involved to provide support. Talk to them about why you are trying to lose weight, and ask them to help. They can help by participating in exercise and having meals with you, even if they may be eating something different. · Find what works best for you. If you do not have time or do not like to cook, a program that offers meal replacement bars or shakes may be better for you.  Or if you like to prepare meals, finding a plan that includes daily menus and recipes may be best.  · Ask your doctor about other health professionals who can help you achieve your weight loss goals. ¨ A dietitian can help you make healthy changes in your diet. ¨ An exercise specialist or  can help you develop a safe and effective exercise program.  ¨ A counselor or psychiatrist can help you cope with issues such as depression, anxiety, or family problems that can make it hard to focus on weight loss. · Consider joining a support group for people who are trying to lose weight. Your doctor can suggest groups in your area. Where can you learn more? Go to https://Visionary Fun.GumGum. org and sign in to your [employer] account. Enter U495 in the [place] box to learn more about \"Starting a Weight Loss Plan: Care Instructions. \"     If you do not have an account, please click on the \"Sign Up Now\" link. Current as of: October 13, 2016  Content Version: 11.5  © 5017-3640 [employer]. Care instructions adapted under license by [facility] ([facility]). If you have questions about a medical condition or this instruction, always ask your healthcare professional. [address] any warranty or liability for your use of this information. Patient Education        DASH Diet: Care Instructions  Your Care Instructions    The DASH diet is an eating plan that can help lower your blood pressure. DASH stands for Dietary Approaches to Stop Hypertension. Hypertension is high blood pressure. The DASH diet focuses on eating foods that are high in calcium, potassium, and magnesium. These nutrients can lower blood pressure. The foods that are highest in these nutrients are fruits, vegetables, low-fat dairy products, nuts, seeds, and legumes. But taking calcium, potassium, and magnesium supplements instead of eating foods that are high in those nutrients does not have the same effect. The DASH diet also includes whole grains, fish, and poultry. The DASH diet is one of several lifestyle changes your doctor may recommend to lower your high blood pressure. Low-Carbohydrate Diets for Weight Loss  What is a low-carbohydrate diet? Low-carb diets avoid foods that are high in carbohydrate. These high-carb foods include pasta, bread, rice, cereal, fruits, and starchy vegetables. Instead, these diets usually have you eat foods that are high in fat and protein. Many people lose weight quickly on a low-carb diet. But the early weight loss is water. People on this diet often gain the weight back after they start eating carbs again. Not all diet plans are safe or work well. A lot of the evidence shows that low-carb diets aren't healthy. That's because these diets often don't include healthy foods like fruits and vegetables. Losing weight safely means balancing protein, fat, and carbs with every meal and snack. And low-carb diets don't always provide the vitamins, minerals, and fiber you need. If you have a serious medical condition, talk to your doctor before you try any diet. These conditions include kidney disease, heart disease, type 2 diabetes, high cholesterol, and high blood pressure. If you are pregnant, it may not be safe for your baby if you are on a low-carb diet. How can you lose weight safely? You might have heard that a diet plan helped another person lose weight. But that doesn't mean that it will work for you. It is very hard to stay on a diet that includes lots of big changes in your eating habits. If you want to get to a healthy weight and stay there, making healthy lifestyle changes will often work better than dieting. These steps can help. · Make a plan for change. Work with your doctor to create a plan that is right for you. · See a dietitian. He or she can show you how to make healthy changes in your eating habits. · Manage stress. If you have a lot of stress in your life, it can be hard to focus on making healthy changes to your daily habits. · Track your food and activity.  You are likely to do better at losing weight if you keep track of what

## 2018-04-18 ENCOUNTER — OFFICE VISIT (OUTPATIENT)
Dept: INTERNAL MEDICINE CLINIC | Age: 48
End: 2018-04-18

## 2018-04-18 VITALS
BODY MASS INDEX: 34.23 KG/M2 | WEIGHT: 186 LBS | DIASTOLIC BLOOD PRESSURE: 86 MMHG | RESPIRATION RATE: 12 BRPM | SYSTOLIC BLOOD PRESSURE: 120 MMHG | HEART RATE: 74 BPM | HEIGHT: 62 IN

## 2018-04-18 DIAGNOSIS — Z80.0 FAMILY HISTORY OF COLON CANCER: ICD-10-CM

## 2018-04-18 DIAGNOSIS — E88.81 METABOLIC SYNDROME: ICD-10-CM

## 2018-04-18 DIAGNOSIS — E66.9 OBESITY (BMI 30-39.9): ICD-10-CM

## 2018-04-18 DIAGNOSIS — E78.5 HYPERLIPIDEMIA, UNSPECIFIED HYPERLIPIDEMIA TYPE: ICD-10-CM

## 2018-04-18 DIAGNOSIS — F41.9 ANXIETY: ICD-10-CM

## 2018-04-18 DIAGNOSIS — F33.0 MAJOR DEPRESSIVE DISORDER, RECURRENT EPISODE, MILD (HCC): ICD-10-CM

## 2018-04-18 DIAGNOSIS — R73.01 IMPAIRED FASTING BLOOD SUGAR: ICD-10-CM

## 2018-04-18 DIAGNOSIS — Z12.11 SCREEN FOR COLON CANCER: ICD-10-CM

## 2018-04-18 DIAGNOSIS — I10 BENIGN ESSENTIAL HTN: Primary | ICD-10-CM

## 2018-04-18 LAB
A/G RATIO: 1.4 (ref 1.1–2.2)
ALBUMIN SERPL-MCNC: 4.3 G/DL (ref 3.4–5)
ALP BLD-CCNC: 58 U/L (ref 40–129)
ALT SERPL-CCNC: 16 U/L (ref 10–40)
ANION GAP SERPL CALCULATED.3IONS-SCNC: 15 MMOL/L (ref 3–16)
AST SERPL-CCNC: 18 U/L (ref 15–37)
BILIRUB SERPL-MCNC: 0.5 MG/DL (ref 0–1)
BUN BLDV-MCNC: 11 MG/DL (ref 7–20)
CALCIUM SERPL-MCNC: 9.5 MG/DL (ref 8.3–10.6)
CHLORIDE BLD-SCNC: 99 MMOL/L (ref 99–110)
CHOLESTEROL, TOTAL: 194 MG/DL (ref 0–199)
CO2: 26 MMOL/L (ref 21–32)
CREAT SERPL-MCNC: 0.6 MG/DL (ref 0.6–1.1)
ESTIMATED AVERAGE GLUCOSE: 114 MG/DL
GFR AFRICAN AMERICAN: >60
GFR NON-AFRICAN AMERICAN: >60
GLOBULIN: 3 G/DL
GLUCOSE BLD-MCNC: 90 MG/DL (ref 70–99)
HBA1C MFR BLD: 5.6 %
HDLC SERPL-MCNC: 48 MG/DL (ref 40–60)
LDL CHOLESTEROL CALCULATED: 104 MG/DL
POTASSIUM SERPL-SCNC: 4.1 MMOL/L (ref 3.5–5.1)
SODIUM BLD-SCNC: 140 MMOL/L (ref 136–145)
TOTAL PROTEIN: 7.3 G/DL (ref 6.4–8.2)
TRIGL SERPL-MCNC: 212 MG/DL (ref 0–150)
VLDLC SERPL CALC-MCNC: 42 MG/DL

## 2018-04-18 PROCEDURE — 99214 OFFICE O/P EST MOD 30 MIN: CPT | Performed by: INTERNAL MEDICINE

## 2018-04-18 ASSESSMENT — PATIENT HEALTH QUESTIONNAIRE - PHQ9
2. FEELING DOWN, DEPRESSED OR HOPELESS: 0
SUM OF ALL RESPONSES TO PHQ QUESTIONS 1-9: 0
1. LITTLE INTEREST OR PLEASURE IN DOING THINGS: 0
SUM OF ALL RESPONSES TO PHQ9 QUESTIONS 1 & 2: 0

## 2018-06-13 RX ORDER — PRAVASTATIN SODIUM 20 MG
TABLET ORAL
Qty: 30 TABLET | Refills: 0 | Status: SHIPPED | OUTPATIENT
Start: 2018-06-13 | End: 2018-07-15 | Stop reason: SDUPTHER

## 2018-06-15 ENCOUNTER — HOSPITAL ENCOUNTER (OUTPATIENT)
Dept: ENDOSCOPY | Age: 48
Discharge: HOME | End: 2018-06-15
Attending: INTERNAL MEDICINE | Admitting: INTERNAL MEDICINE

## 2018-07-16 RX ORDER — PRAVASTATIN SODIUM 20 MG
TABLET ORAL
Qty: 30 TABLET | Refills: 0 | Status: SHIPPED | OUTPATIENT
Start: 2018-07-16 | End: 2018-08-16 | Stop reason: SDUPTHER

## 2018-07-16 RX ORDER — POTASSIUM CHLORIDE 1500 MG/1
TABLET, FILM COATED, EXTENDED RELEASE ORAL
Qty: 60 TABLET | Refills: 0 | Status: SHIPPED | OUTPATIENT
Start: 2018-07-16 | End: 2018-08-16 | Stop reason: SDUPTHER

## 2018-08-16 RX ORDER — POTASSIUM CHLORIDE 1500 MG/1
20 TABLET, FILM COATED, EXTENDED RELEASE ORAL 2 TIMES DAILY WITH MEALS
Qty: 180 TABLET | Refills: 3 | Status: SHIPPED | OUTPATIENT
Start: 2018-08-16 | End: 2018-10-18 | Stop reason: SDUPTHER

## 2018-08-16 RX ORDER — PRAVASTATIN SODIUM 20 MG
TABLET ORAL
Qty: 90 TABLET | Refills: 3 | Status: SHIPPED | OUTPATIENT
Start: 2018-08-16 | End: 2019-08-13 | Stop reason: SDUPTHER

## 2018-08-16 RX ORDER — PRAVASTATIN SODIUM 20 MG
TABLET ORAL
Qty: 90 TABLET | Refills: 3 | Status: SHIPPED | OUTPATIENT
Start: 2018-08-16 | End: 2018-08-16 | Stop reason: SDUPTHER

## 2018-09-21 ENCOUNTER — TELEPHONE (OUTPATIENT)
Dept: INTERNAL MEDICINE CLINIC | Age: 48
End: 2018-09-21

## 2018-09-25 ENCOUNTER — TELEPHONE (OUTPATIENT)
Dept: INTERNAL MEDICINE CLINIC | Age: 48
End: 2018-09-25

## 2018-10-15 RX ORDER — HYDROCHLOROTHIAZIDE 25 MG/1
25 TABLET ORAL DAILY
Qty: 90 TABLET | Refills: 1 | Status: SHIPPED | OUTPATIENT
Start: 2018-10-15 | End: 2019-04-14 | Stop reason: SDUPTHER

## 2018-10-18 ENCOUNTER — OFFICE VISIT (OUTPATIENT)
Dept: INTERNAL MEDICINE CLINIC | Age: 48
End: 2018-10-18
Payer: COMMERCIAL

## 2018-10-18 VITALS
HEIGHT: 62 IN | DIASTOLIC BLOOD PRESSURE: 80 MMHG | HEART RATE: 56 BPM | SYSTOLIC BLOOD PRESSURE: 120 MMHG | WEIGHT: 186 LBS | BODY MASS INDEX: 34.23 KG/M2 | RESPIRATION RATE: 12 BRPM

## 2018-10-18 DIAGNOSIS — Z00.00 ANNUAL PHYSICAL EXAM: Primary | ICD-10-CM

## 2018-10-18 DIAGNOSIS — I10 BENIGN ESSENTIAL HTN: ICD-10-CM

## 2018-10-18 DIAGNOSIS — M25.511 BILATERAL SHOULDER PAIN, UNSPECIFIED CHRONICITY: ICD-10-CM

## 2018-10-18 DIAGNOSIS — M72.2 PLANTAR FASCIITIS OF RIGHT FOOT: ICD-10-CM

## 2018-10-18 DIAGNOSIS — R73.01 IMPAIRED FASTING BLOOD SUGAR: ICD-10-CM

## 2018-10-18 DIAGNOSIS — Z00.00 ANNUAL PHYSICAL EXAM: ICD-10-CM

## 2018-10-18 DIAGNOSIS — M25.512 BILATERAL SHOULDER PAIN, UNSPECIFIED CHRONICITY: ICD-10-CM

## 2018-10-18 DIAGNOSIS — M54.2 NECK PAIN: ICD-10-CM

## 2018-10-18 LAB
A/G RATIO: 1.3 (ref 1.1–2.2)
ALBUMIN SERPL-MCNC: 4.4 G/DL (ref 3.4–5)
ALP BLD-CCNC: 64 U/L (ref 40–129)
ALT SERPL-CCNC: 22 U/L (ref 10–40)
ANION GAP SERPL CALCULATED.3IONS-SCNC: 15 MMOL/L (ref 3–16)
AST SERPL-CCNC: 22 U/L (ref 15–37)
BASOPHILS ABSOLUTE: 0 K/UL (ref 0–0.2)
BASOPHILS RELATIVE PERCENT: 0.7 %
BILIRUB SERPL-MCNC: 0.6 MG/DL (ref 0–1)
BUN BLDV-MCNC: 10 MG/DL (ref 7–20)
CALCIUM SERPL-MCNC: 9.9 MG/DL (ref 8.3–10.6)
CHLORIDE BLD-SCNC: 97 MMOL/L (ref 99–110)
CHOLESTEROL, TOTAL: 212 MG/DL (ref 0–199)
CO2: 28 MMOL/L (ref 21–32)
CREAT SERPL-MCNC: 0.6 MG/DL (ref 0.6–1.1)
EOSINOPHILS ABSOLUTE: 0.1 K/UL (ref 0–0.6)
EOSINOPHILS RELATIVE PERCENT: 1.5 %
ESTIMATED AVERAGE GLUCOSE: 119.8 MG/DL
GFR AFRICAN AMERICAN: >60
GFR NON-AFRICAN AMERICAN: >60
GLOBULIN: 3.4 G/DL
GLUCOSE BLD-MCNC: 99 MG/DL (ref 70–99)
HBA1C MFR BLD: 5.8 %
HCT VFR BLD CALC: 39.9 % (ref 36–48)
HDLC SERPL-MCNC: 48 MG/DL (ref 40–60)
HEMOGLOBIN: 13.2 G/DL (ref 12–16)
LDL CHOLESTEROL CALCULATED: 120 MG/DL
LYMPHOCYTES ABSOLUTE: 2.3 K/UL (ref 1–5.1)
LYMPHOCYTES RELATIVE PERCENT: 37.7 %
MCH RBC QN AUTO: 30.3 PG (ref 26–34)
MCHC RBC AUTO-ENTMCNC: 33.1 G/DL (ref 31–36)
MCV RBC AUTO: 91.6 FL (ref 80–100)
MONOCYTES ABSOLUTE: 0.4 K/UL (ref 0–1.3)
MONOCYTES RELATIVE PERCENT: 7 %
NEUTROPHILS ABSOLUTE: 3.3 K/UL (ref 1.7–7.7)
NEUTROPHILS RELATIVE PERCENT: 53.1 %
PDW BLD-RTO: 12.9 % (ref 12.4–15.4)
PLATELET # BLD: 245 K/UL (ref 135–450)
PMV BLD AUTO: 9.1 FL (ref 5–10.5)
POTASSIUM SERPL-SCNC: 4.2 MMOL/L (ref 3.5–5.1)
RBC # BLD: 4.35 M/UL (ref 4–5.2)
SODIUM BLD-SCNC: 140 MMOL/L (ref 136–145)
TOTAL PROTEIN: 7.8 G/DL (ref 6.4–8.2)
TRIGL SERPL-MCNC: 220 MG/DL (ref 0–150)
TSH SERPL DL<=0.05 MIU/L-ACNC: 1.79 UIU/ML (ref 0.27–4.2)
VLDLC SERPL CALC-MCNC: 44 MG/DL
WBC # BLD: 6.2 K/UL (ref 4–11)

## 2018-10-18 PROCEDURE — 93000 ELECTROCARDIOGRAM COMPLETE: CPT | Performed by: INTERNAL MEDICINE

## 2018-10-18 PROCEDURE — 99396 PREV VISIT EST AGE 40-64: CPT | Performed by: INTERNAL MEDICINE

## 2018-10-18 RX ORDER — MELOXICAM 15 MG/1
15 TABLET ORAL
Qty: 30 TABLET | Refills: 0 | Status: SHIPPED | OUTPATIENT
Start: 2018-10-18 | End: 2019-04-16 | Stop reason: ALTCHOICE

## 2018-10-18 RX ORDER — LOSARTAN POTASSIUM 50 MG/1
TABLET ORAL
Qty: 60 TABLET | Refills: 11 | Status: SHIPPED | OUTPATIENT
Start: 2018-10-18 | End: 2018-12-13 | Stop reason: DRUGHIGH

## 2018-10-18 RX ORDER — VENLAFAXINE HYDROCHLORIDE 75 MG/1
CAPSULE, EXTENDED RELEASE ORAL
Qty: 30 CAPSULE | Refills: 11 | Status: SHIPPED | OUTPATIENT
Start: 2018-10-18 | End: 2019-08-07 | Stop reason: SDUPTHER

## 2018-10-18 NOTE — PATIENT INSTRUCTIONS
label.  · Use ice massage to help with pain and swelling. You can use an ice cube or an ice cup several times a day. To make an ice cup, fill a paper cup with water and freeze it. Cut off the top of the cup until a half-inch of ice shows. Hold onto the remaining paper to use the cup. Rub the ice in small circles over the area for 5 to 7 minutes. · Contrast baths, which alternate hot and cold water, can also help reduce swelling. But because heat alone may make pain and swelling worse, end a contrast bath with a soak in cold water. · Wear a night splint if your doctor suggests it. A night splint holds your foot with the toes pointed up and the foot and ankle at a 90-degree angle. This position gives the bottom of your foot a constant, gentle stretch. · Do simple exercises such as calf stretches and towel stretches 2 to 3 times each day, especially when you first get up in the morning. These can help the plantar fascia become more flexible. They also make the muscles that support your arch stronger. Hold these stretches for 15 to 30 seconds per stretch. Repeat 2 to 4 times. ¨ Stand about 1 foot from a wall. Place the palms of both hands against the wall at chest level. Lean forward against the wall, keeping one leg with the knee straight and heel on the ground while bending the knee of the other leg. ¨ Sit down on the floor or a mat with your feet stretched in front of you. Roll up a towel lengthwise, and loop it over the ball of your foot. Holding the towel at both ends, gently pull the towel toward you to stretch your foot. · Wear shoes with good arch support. Athletic shoes or shoes with a well-cushioned sole are good choices. · Try heel cups or shoe inserts (orthotics) to help cushion your heel. You can buy these at many shoe stores. · Put on your shoes as soon as you get out of bed. Going barefoot or wearing slippers may make your pain worse. · Reach and stay at a good weight for your height.  This puts [email or web address] and sign in to your [employer] account. Enter P072 in the [place] box to learn more about \"Well Visit, Ages 25 to 48: Care Instructions. \"     If you do not have an account, please click on the \"Sign Up Now\" link. Current as of: May 16, 2017  Content Version: 11.7  © 7216-8253 [employer]. Care instructions adapted under license by [facility] ([facility]). If you have questions about a medical condition or this instruction, always ask your healthcare professional. [place] [address] any warranty or liability for your use of this information.

## 2018-12-13 ENCOUNTER — PATIENT MESSAGE (OUTPATIENT)
Dept: INTERNAL MEDICINE CLINIC | Age: 48
End: 2018-12-13

## 2018-12-13 RX ORDER — LOSARTAN POTASSIUM 100 MG/1
100 TABLET ORAL DAILY
Qty: 90 TABLET | Refills: 3 | Status: SHIPPED | OUTPATIENT
Start: 2018-12-13 | End: 2019-08-28 | Stop reason: SDUPTHER

## 2018-12-13 RX ORDER — LOSARTAN POTASSIUM 100 MG/1
100 TABLET ORAL DAILY
Qty: 90 TABLET | Refills: 3 | Status: SHIPPED | OUTPATIENT
Start: 2018-12-13 | End: 2018-12-13 | Stop reason: SDUPTHER

## 2019-01-31 ENCOUNTER — TELEPHONE (OUTPATIENT)
Dept: INTERNAL MEDICINE CLINIC | Age: 49
End: 2019-01-31

## 2019-04-15 RX ORDER — HYDROCHLOROTHIAZIDE 25 MG/1
TABLET ORAL
Qty: 90 TABLET | Refills: 1 | Status: SHIPPED | OUTPATIENT
Start: 2019-04-15 | End: 2019-08-07 | Stop reason: SDUPTHER

## 2019-04-16 ENCOUNTER — OFFICE VISIT (OUTPATIENT)
Dept: INTERNAL MEDICINE CLINIC | Age: 49
End: 2019-04-16
Payer: COMMERCIAL

## 2019-04-16 VITALS
HEIGHT: 62 IN | RESPIRATION RATE: 12 BRPM | BODY MASS INDEX: 32.76 KG/M2 | DIASTOLIC BLOOD PRESSURE: 80 MMHG | HEART RATE: 74 BPM | WEIGHT: 178 LBS | SYSTOLIC BLOOD PRESSURE: 120 MMHG

## 2019-04-16 DIAGNOSIS — F33.0 MAJOR DEPRESSIVE DISORDER, RECURRENT EPISODE, MILD (HCC): ICD-10-CM

## 2019-04-16 DIAGNOSIS — E88.81 METABOLIC SYNDROME: ICD-10-CM

## 2019-04-16 DIAGNOSIS — I10 BENIGN ESSENTIAL HTN: Primary | ICD-10-CM

## 2019-04-16 DIAGNOSIS — M79.671 CHRONIC HEEL PAIN, RIGHT: ICD-10-CM

## 2019-04-16 DIAGNOSIS — R73.01 IMPAIRED FASTING BLOOD SUGAR: ICD-10-CM

## 2019-04-16 DIAGNOSIS — E78.2 HYPERLIPIDEMIA, MIXED: ICD-10-CM

## 2019-04-16 DIAGNOSIS — G89.29 CHRONIC HEEL PAIN, RIGHT: ICD-10-CM

## 2019-04-16 PROCEDURE — 99214 OFFICE O/P EST MOD 30 MIN: CPT | Performed by: INTERNAL MEDICINE

## 2019-04-16 NOTE — PROGRESS NOTES
[facility]) Physicians  Internal Medicine  Patient Encounter  Danelle Marinelli D.O., Kari Prado         Chief Complaint   Patient presents with    Medication Check    Check-Up       HPI: 53 y.o. female with multiple medical problems seen today for a checkup regarding her current issues listed below along with a medication review. HTN-- She remains on Losartan 100 mg daily, hydrochlorothiazide 25 mg daily. She states she is doing well. She did have some lightheadedness, but this is better. No problems with low BP. Hyperlipidemia:    Lab Results   Component Value Date    LDLCALC 120 (H) 10/18/2018     She has been on pravastatin for several years. Despite the new guidelines she has continued to take medication for cardiovascular risk reduction given her cardiovascular risk factors. Depression-- Patient remains on Effexor XR 75 mg daily. She denies adverse effects. She feels the medication works well. She states her mood is good. IFG--Patient denies any problems with excessive thirst or frequent urination. She also has dyslipidemia. Her hyperglycemia, hypertriglyceridemia, and obesity are consistent with metabolic syndrome. She is down 8 pounds but has historically had difficulty making any impact with regards to weight loss. She states she actually lost more weight, but gained some back. She states she was as low as 172#. She is tracking her food intake. She is eating less and making better choices. She started making lifestyle changes in January. Lab Results   Component Value Date    LABA1C 5.8 10/18/2018     Lab Results   Component Value Date    .8 10/18/2018         Past Medical History:   Diagnosis Date    Allergic rhinitis     Caesarean Section     7/10/94    Depression     well controlled    Dysplastic nevus 7/21/10    Hyperlipidemia     Hypertension     Impaired fasting glucose        Review of Systems - As per HPI  MS:  Right heel pain.         OBJECTIVE:    BP 120/80   Pulse 74   Resp 12   Ht 5' 2\" (1.575 m)   Wt 178 lb (80.7 kg)   LMP 05/06/2011   BMI 32.56 kg/m²     Wt Readings from Last 3 Encounters:   04/16/19 178 lb (80.7 kg)   10/18/18 186 lb (84.4 kg)   04/18/18 186 lb (84.4 kg)       BP Readings from Last 3 Encounters:   04/16/19 120/80   10/18/18 120/80   04/18/18 120/86         GEN: NAD, A&O, Obese  HEENT: NC/AT, CELINA, Oral cavity Clear, anicteric, TM's NL. Tongue midline  CV:  RRR, no murmur. No ectopy  NECK: Supple,  no thyromegaly or nodules. No JVD. Trachea midline  PULM: CTA, no wheezing  EXT: No edema. GI: Abd Soft, NT. No masses  NEURO: No focal or lateralizing deficits. Cranial nerves II through XII are intact. Moves all extremities symmetrically. SKIN:  No rashes  VAC:  Carotid upstrokes are 2+. Pedal pulses are 2+/4 and symmetrical    MS: no synovitis or joint effusions. Limping.  + TTP over the plantar surface of the right heel. ASSESSMENT/PLAN:    1. Benign essential HTN  Her blood pressure is well-controlled  Continue current medications  Continue efforts with weight loss. Monitor blood pressure as she continues to lose weight to watch for hypotension.  - Comprehensive Metabolic Panel; Future    2. Impaired fasting blood sugar  Condition stability is uncertain. Blood sugar expected to be better having lost some weight  Continue lifestyle modification  - Comprehensive Metabolic Panel; Future  - Hemoglobin A1C; Future    3. Metabolic syndrome  Condition stability is uncertain. Due for metabolic profile and P4U as well as lipid profile  Continue lifestyle approach including carbohydrate restriction, portion control, and exercise  - Comprehensive Metabolic Panel; Future  - Hemoglobin A1C; Future  - Lipid Panel; Future    4. Major depressive disorder, recurrent episode, mild (HCC)  Condition is well controlled  Continue Effexor XR  Continue to monitor. 4 increased stress.     5. Hyperlipidemia, mixed  Condition stability is uncertain though expected to be improved with her weight loss  Continue efforts with lifestyle approach. Patient counseled on low-fat, portion control, low carb eating  - Lipid Panel; Future    6. Chronic heel pain, right  This is an ongoing problem.   Referral to podiatry  - Meka Gracia DPM, Podiatry, [address]

## 2019-04-16 NOTE — PATIENT INSTRUCTIONS
Patient Education        Diet and Exercise for Metabolic Syndrome: Care Instructions  Your Care Instructions    Metabolic syndrome is the name for a group of health problems. It includes having too much fat around your waist and high blood pressure. It also includes high triglycerides, high blood sugar, and low levels of healthy (HDL) cholesterol. These problems make it more likely you will have a heart attack or stroke or get diabetes. Your family history (your genes) can cause metabolic syndrome. So can unhealthy eating habits and not getting enough exercise. You can help lower your risk of heart attack, stroke, and diabetes if you eat healthy foods and get more exercise. It may be hard to make these lifestyle changes. But even small changes can help. Follow-up care is a key part of your treatment and safety. Be sure to make and go to all appointments, and call your doctor if you are having problems. It's also a good idea to know your test results and keep a list of the medicines you take. How can you care for yourself at home? Eat more fruits and vegetables  · Fruits and vegetables have nutrients to help protect you from heart disease and high blood pressure. They are low in fat and high in fiber. Dark green, orange, and yellow ones are the healthiest.  · Keep lots of vegetables ready for snacks. · Buy fruit that is in season. Then put it where you can see it so you will want to eat it. · Cook dishes that have a lot of vegetables. Soups and stir-fries are good choices. Limit saturated and trans fats  · Read food labels, and try to avoid saturated and trans fats. They increase your risk of heart disease. · Use olive or canola oil when you cook. · Bake, broil, grill, or steam foods. Avoid fried foods. · Limit how much high-fat meat you eat. This includes hot dogs and sausages. When you prepare meat, cut off all the fat. · You can replace high-fat meat with fish and skinless poultry.  You can also try products made from soybeans, like tofu. Soybeans may be very good for your heart. · Eat at least 2 servings of fish a week. Fish with omega-3 fatty acids may help reduce your risk of getting coronary artery disease. Grandy and sardines are good examples. · Choose low-fat or fat-free milk and dairy products. Eat foods high in fiber  · Foods high in fiber may reduce your cholesterol. And they may give you important vitamins and minerals. Good examples are oatmeal, cooked dried beans, brown rice, citrus fruits, and apples. · Eat whole-grain breads and cereals. They have more fiber than white bread or pastries. Limit high-sugar foods  · Limit foods and drinks that are high in sugar. Some examples are soda pop, sugar-sweetened fruit drinks, candy, and many desserts. · Limit the amount of sugar, honey, and other sweeteners that you add to food and drinks. · Choose water instead of soda pop or other sugar-sweetened drinks. · Limit fruit juice. Limit salt and sodium  · You can help lower your blood pressure if you limit salt and sodium. · If you take the salt shaker off the table, it may be easier to use less salt. You can also try using half the salt in a recipe. And you can avoid adding salt to cooking water for pasta, rice, and potatoes. · Try to eat fewer snacks, fast foods, and other high-salt, processed foods. Check labels so you know how much sodium a food has. · Choose canned goods (soups, vegetables, and beans) that are low in sodium. Get regular exercise  · Get more exercise. Make sure your doctor knows when you start a new exercise program. Even small amounts of exercise will help you get stronger and have more energy. It can also help you manage your weight and your stress. · Walking is a good choice. Little by little, increase the amount you walk every day. Try for at least 30 minutes on most days of the week. Where can you learn more? Go to [email or web address] and sign in to your [employer] account. Enter 467 [id number] in the [facility] box to learn more about \"Diet and Exercise for Metabolic Syndrome: Care Instructions. \"     If you do not have an account, please click on the \"Sign Up Now\" link. Current as of: March 28, 2018  Content Version: 11.9  © 6127-5722 [employer]. Care instructions adapted under license by [facility] ([facility]). If you have questions about a medical condition or this instruction, always ask your healthcare professional. [address] any warranty or liability for your use of this information. Patient Education        Learning About Low-Carbohydrate Diets for Weight Loss  What is a low-carbohydrate diet? Low-carb diets avoid foods that are high in carbohydrate. These high-carb foods include pasta, bread, rice, cereal, fruits, and starchy vegetables. Instead, these diets usually have you eat foods that are high in fat and protein. Many people lose weight quickly on a low-carb diet. But the early weight loss is water. People on this diet often gain the weight back after they start eating carbs again. Not all diet plans are safe or work well. A lot of the evidence shows that low-carb diets aren't healthy. That's because these diets often don't include healthy foods like fruits and vegetables. Losing weight safely means balancing protein, fat, and carbs with every meal and snack. And low-carb diets don't always provide the vitamins, minerals, and fiber you need. If you have a serious medical condition, talk to your doctor before you try any diet. These conditions include kidney disease, heart disease, type 2 diabetes, high cholesterol, and high blood pressure. If you are pregnant, it may not be safe for your baby if you are on a low-carb diet. How can you lose weight safely? You might have heard that a diet plan helped another person lose weight. But that doesn't mean that it will work for you.   It is very hard to stay on

## 2019-05-15 ENCOUNTER — TELEPHONE (OUTPATIENT)
Dept: INTERNAL MEDICINE CLINIC | Age: 49
End: 2019-05-15

## 2019-07-01 RX ORDER — POTASSIUM CHLORIDE 20 MEQ/1
TABLET, EXTENDED RELEASE ORAL
Qty: 180 TABLET | Refills: 3 | Status: SHIPPED | OUTPATIENT
Start: 2019-07-01 | End: 2019-08-28 | Stop reason: SDUPTHER

## 2019-08-07 RX ORDER — VENLAFAXINE HYDROCHLORIDE 75 MG/1
CAPSULE, EXTENDED RELEASE ORAL
Qty: 90 CAPSULE | Refills: 3 | Status: SHIPPED | OUTPATIENT
Start: 2019-08-07 | End: 2020-01-17 | Stop reason: DRUGHIGH

## 2019-08-07 RX ORDER — HYDROCHLOROTHIAZIDE 25 MG/1
TABLET ORAL
Qty: 90 TABLET | Refills: 3 | Status: SHIPPED | OUTPATIENT
Start: 2019-08-07 | End: 2020-07-21

## 2019-08-13 ENCOUNTER — PATIENT MESSAGE (OUTPATIENT)
Dept: INTERNAL MEDICINE CLINIC | Age: 49
End: 2019-08-13

## 2019-08-13 RX ORDER — PRAVASTATIN SODIUM 20 MG
TABLET ORAL
Qty: 90 TABLET | Refills: 3 | Status: SHIPPED | OUTPATIENT
Start: 2019-08-13 | End: 2020-07-21

## 2019-08-28 RX ORDER — POTASSIUM CHLORIDE 20 MEQ/1
TABLET, EXTENDED RELEASE ORAL
Qty: 180 TABLET | Refills: 3 | Status: SHIPPED | OUTPATIENT
Start: 2019-08-28 | End: 2020-05-03 | Stop reason: SDUPTHER

## 2019-08-28 RX ORDER — LOSARTAN POTASSIUM 100 MG/1
100 TABLET ORAL DAILY
Qty: 90 TABLET | Refills: 3 | Status: SHIPPED | OUTPATIENT
Start: 2019-08-28 | End: 2020-05-03 | Stop reason: SDUPTHER

## 2019-10-25 ENCOUNTER — OFFICE VISIT (OUTPATIENT)
Dept: INTERNAL MEDICINE CLINIC | Age: 49
End: 2019-10-25
Payer: COMMERCIAL

## 2019-10-25 VITALS
SYSTOLIC BLOOD PRESSURE: 120 MMHG | HEIGHT: 62 IN | RESPIRATION RATE: 12 BRPM | DIASTOLIC BLOOD PRESSURE: 70 MMHG | HEART RATE: 62 BPM | BODY MASS INDEX: 33.49 KG/M2 | WEIGHT: 182 LBS

## 2019-10-25 DIAGNOSIS — Z00.00 ANNUAL PHYSICAL EXAM: Primary | ICD-10-CM

## 2019-10-25 DIAGNOSIS — R73.01 IMPAIRED FASTING BLOOD SUGAR: ICD-10-CM

## 2019-10-25 DIAGNOSIS — E78.2 HYPERLIPIDEMIA, MIXED: ICD-10-CM

## 2019-10-25 DIAGNOSIS — I10 BENIGN ESSENTIAL HTN: ICD-10-CM

## 2019-10-25 DIAGNOSIS — Z13.1 SCREENING FOR DIABETES MELLITUS: ICD-10-CM

## 2019-10-25 DIAGNOSIS — Z23 FLU VACCINE NEED: ICD-10-CM

## 2019-10-25 DIAGNOSIS — E88.81 METABOLIC SYNDROME: ICD-10-CM

## 2019-10-25 PROCEDURE — 90686 IIV4 VACC NO PRSV 0.5 ML IM: CPT | Performed by: INTERNAL MEDICINE

## 2019-10-25 PROCEDURE — 99396 PREV VISIT EST AGE 40-64: CPT | Performed by: INTERNAL MEDICINE

## 2019-10-25 PROCEDURE — 90471 IMMUNIZATION ADMIN: CPT | Performed by: INTERNAL MEDICINE

## 2019-10-25 PROCEDURE — 93000 ELECTROCARDIOGRAM COMPLETE: CPT | Performed by: INTERNAL MEDICINE

## 2020-01-08 ENCOUNTER — PATIENT MESSAGE (OUTPATIENT)
Dept: INTERNAL MEDICINE CLINIC | Age: 50
End: 2020-01-08

## 2020-01-17 RX ORDER — VENLAFAXINE HYDROCHLORIDE 150 MG/1
150 CAPSULE, EXTENDED RELEASE ORAL DAILY
Qty: 30 CAPSULE | Refills: 3 | Status: SHIPPED | OUTPATIENT
Start: 2020-01-17 | End: 2020-02-11 | Stop reason: SDUPTHER

## 2020-02-11 RX ORDER — VENLAFAXINE HYDROCHLORIDE 150 MG/1
150 CAPSULE, EXTENDED RELEASE ORAL DAILY
Qty: 90 CAPSULE | Refills: 3 | Status: SHIPPED | OUTPATIENT
Start: 2020-02-11 | End: 2020-11-10 | Stop reason: SDUPTHER

## 2020-04-27 ENCOUNTER — TELEMEDICINE (OUTPATIENT)
Dept: INTERNAL MEDICINE CLINIC | Age: 50
End: 2020-04-27
Payer: COMMERCIAL

## 2020-04-27 VITALS
HEART RATE: 81 BPM | SYSTOLIC BLOOD PRESSURE: 125 MMHG | BODY MASS INDEX: 34.2 KG/M2 | WEIGHT: 187 LBS | DIASTOLIC BLOOD PRESSURE: 81 MMHG | TEMPERATURE: 97.6 F

## 2020-04-27 PROCEDURE — 99214 OFFICE O/P EST MOD 30 MIN: CPT | Performed by: INTERNAL MEDICINE

## 2020-04-27 RX ORDER — MECLIZINE HYDROCHLORIDE 25 MG/1
25 TABLET ORAL 3 TIMES DAILY PRN
Status: SHIPPED | COMMUNITY
Start: 2020-04-27

## 2020-04-27 RX ORDER — FEXOFENADINE HCL 180 MG/1
180 TABLET ORAL DAILY
COMMUNITY
End: 2022-04-12 | Stop reason: ALTCHOICE

## 2020-04-27 ASSESSMENT — ENCOUNTER SYMPTOMS
COUGH: 0
SORE THROAT: 0
ABDOMINAL PAIN: 0
NAUSEA: 0
SHORTNESS OF BREATH: 0
DIARRHEA: 0
TROUBLE SWALLOWING: 0
VOMITING: 0

## 2020-04-27 NOTE — PROGRESS NOTES
extended release tablet TAKE ONE TABLET BY MOUTH TWICE A DAY  Jb Trejo DO   pravastatin (PRAVACHOL) 20 MG tablet TAKE ONE TABLET BY MOUTH DAILY  Jb Trejo DO   hydrochlorothiazide (HYDRODIURIL) 25 MG tablet TAKE ONE TABLET BY MOUTH DAILY  Jb Trejo DO   meclizine (ANTIVERT) 25 MG tablet Take 25 mg by mouth 3 times daily as needed  Historical Provider, MD   Elastic Bandages & Supports (WRIST SPLINT/COCK-UP/RIGHT M) MISC Wear nightly or as needed  Zionchinyere Barraza DO         Review of Systems   Constitutional: Negative for chills, fever and unexpected weight change. HENT: Negative for sore throat and trouble swallowing. Has had allergy symptoms but improved on Allegra   Eyes: Negative for visual disturbance. Respiratory: Negative for cough and shortness of breath. Cardiovascular: Positive for palpitations. Negative for chest pain and leg swelling. No claudication  No Orthopnea     Gastrointestinal: Negative for abdominal pain, diarrhea, nausea and vomiting. Genitourinary: Negative for difficulty urinating. Skin: Negative for rash. Neurological: Negative for dizziness, numbness and headaches. No unilateral weakness or paresthesias           PHYSICAL EXAMINATION:  [ INSTRUCTIONS:  \"[x]\" Indicates a positive item  \"[]\" Indicates a negative item  -- DELETE ALL ITEMS NOT EXAMINED]  Vital Signs: (As obtained by patient/caregiver or practitioner observation)    Vitals:    04/27/20 0938   BP: 125/81   Pulse: 81   Temp: 97.6 °F (36.4 °C)   Weight: 187 lb (84.8 kg)     Body mass index is 34.2 kg/m². Wt Readings from Last 3 Encounters:   04/27/20 187 lb (84.8 kg)   10/25/19 182 lb (82.6 kg)   04/16/19 178 lb (80.7 kg)     BP Readings from Last 3 Encounters:   04/27/20 125/81   10/25/19 120/70   04/16/19 120/80           Physical Exam  Constitutional:       General: She is not in acute distress. Appearance: Normal appearance. She is obese. She is not ill-appearing. HENT:      Mouth/Throat:      Mouth: Mucous membranes are moist.   Eyes:      Extraocular Movements: Extraocular movements intact. Conjunctiva/sclera: Conjunctivae normal.   Neck:      Musculoskeletal: Normal range of motion. Pulmonary:      Effort: Pulmonary effort is normal. No respiratory distress. Abdominal:      General: There is no distension. Skin:     Findings: No rash. Neurological:      Mental Status: She is alert. Psychiatric:         Mood and Affect: Mood normal.         Thought Content: Thought content normal.         Judgment: Judgment normal.           Other pertinent observable physical exam findings-     Due to this being a TeleHealth encounter, evaluation of the following organ systems is limited: Vitals/Constitutional/EENT/Resp/CV/GI//MS/Neuro/Skin/Heme-Lymph-Imm. ASSESSMENT/PLAN:  1. Benign essential HTN  Blood pressure is well controlled  Continue current dose of losartan and HCTZ  Check electrolytes. Patient will be given the order. She should go to the lab but continued to practice social distancing and she was advised to wear a mask. - Comprehensive Metabolic Panel; Future  - Lipid Panel; Future    2. Hyperlipidemia, mixed  Condition stability and control are uncertain. Due for lab  Continue pravastatin  Patient counseled on and recommended to engage more and lifestyle modification including a low-fat, portion controlled diet. Patient recommended to exercise more. We discussed a walking program  - Comprehensive Metabolic Panel; Future  - Lipid Panel; Future    3. Impaired fasting blood sugar  Condition stability and control are uncertain. Due for lab  Continue a low-carb approach to her diet as well  Continue regular exercise. - Comprehensive Metabolic Panel; Future  - Hemoglobin A1C; Future    4. Major depressive disorder, recurrent episode, mild (HCC)  Condition is well controlled on the Effexor. Condition is stable  Continue Effexor  mg daily.   Continue monitoring for worsening symptoms. Recommended she exercise regularly. We talked about a walking program.    5. Metabolic syndrome  Condition stability is uncertain. Due for labs she is at high risk for diabetes. Lifestyle approach  - Comprehensive Metabolic Panel; Future  - Lipid Panel; Future  - Hemoglobin A1C; Future    6. Palpitations  This is a recurrent problem. Recommend a 14-day cardiac monitor. Check electrolytes. TSH was normal in October  - Comprehensive Metabolic Panel; Future  - Cardiac event monitor; Future    7. Seasonal allergic rhinitis, unspecified trigger  Continue Allegra. Return in about 6 months (around 10/27/2020) for Annual Preventive Physical, Fasting. An  electronic signature was used to authenticate this note. --Suri Culp, DO on 4/27/2020 at 9:46 AM    119}    Pursuant to the emergency declaration under the [facility] waiver authority and the [employer] and [employer] Act, this Virtual  Visit was conducted, with patient's consent, to reduce the patient's risk of exposure to COVID-19 and provide continuity of care for an established patient. Services were provided through a video synchronous discussion virtually to substitute for in-person clinic visit.

## 2020-04-27 NOTE — PATIENT INSTRUCTIONS
speaking. ? Sudden confusion or trouble understanding simple statements. ? Sudden problems with walking or balance. ? A sudden, severe headache that is different from past headaches.    Call your doctor now or seek immediate medical care if:    · You have heart palpitations and:  ? Are dizzy or lightheaded, or you feel like you may faint. ? Have new or increased shortness of breath.    Watch closely for changes in your health, and be sure to contact your doctor if:    · You continue to have heart palpitations. Where can you learn more? Go to [email or web address] org and sign in to your [employer] account. Enter R508 in the Constant Contact box to learn more about \"Palpitations: Care Instructions. \"     If you do not have an account, please click on the \"Sign Up Now\" link. Current as of: December 15, 2019Content Version: 12.4  © 3065-7269 [employer]. Care instructions adapted under license by [facility] ([facility]). If you have questions about a medical condition or this instruction, always ask your healthcare professional. [address] any warranty or liability for your use of this information. Patient Education        Diet and Exercise for Metabolic Syndrome: Care Instructions  Your Care Instructions    Metabolic syndrome is the name for a group of health problems. It includes having too much fat around your waist and high blood pressure. It also includes high triglycerides, high blood sugar, and low levels of healthy (HDL) cholesterol. These problems make it more likely you will have a heart attack or stroke or get diabetes. Your family history (your genes) can cause metabolic syndrome. So can unhealthy eating habits and not getting enough exercise. You can help lower your risk of heart attack, stroke, and diabetes if you eat healthy foods and get more exercise. It may be hard to make these lifestyle changes.  But even small changes can help.  Follow-up care is a key part of your treatment and safety. Be sure to make and go to all appointments, and call your doctor if you are having problems. It's also a good idea to know your test results and keep a list of the medicines you take. How can you care for yourself at home? Eat more fruits and vegetables  · Fruits and vegetables have nutrients to help protect you from heart disease and high blood pressure. They are low in fat and high in fiber. Dark green, orange, and yellow ones are the healthiest.  · Keep lots of vegetables ready for snacks. · Buy fruit that is in season. Then put it where you can see it so you will want to eat it. · Cook dishes that have a lot of vegetables. Soups and stir-fries are good choices. Limit saturated and trans fats  · Read food labels, and try to avoid saturated and trans fats. They increase your risk of heart disease. · Use olive or canola oil when you cook. · Bake, broil, grill, or steam foods. Avoid fried foods. · Limit how much high-fat meat you eat. This includes hot dogs and sausages. When you prepare meat, cut off all the fat. · You can replace high-fat meat with fish and skinless poultry. You can also try products made from soybeans, like tofu. Soybeans may be very good for your heart. · Choose low-fat or fat-free milk and dairy products. Eat foods high in fiber  · Foods high in fiber may reduce your cholesterol. And they may give you important vitamins and minerals. Good examples are oatmeal, cooked dried beans, brown rice, citrus fruits, and apples. · Eat whole-grain breads and cereals. They have more fiber than white bread or pastries. Limit high-sugar foods  · Limit foods and drinks that are high in sugar. Some examples are soda pop, sugar-sweetened fruit drinks, candy, and many desserts. · Limit the amount of sugar, honey, and other sweeteners that you add to food and drinks.   · Choose water instead of soda pop or other sugar-sweetened drinks. · Limit fruit juice. Limit salt and sodium  · You can help lower your blood pressure if you limit salt and sodium. · If you take the salt shaker off the table, it may be easier to use less salt. You can also try using half the salt in a recipe. And you can avoid adding salt to cooking water for pasta, rice, and potatoes. · Try to eat fewer snacks, fast foods, and other high-salt, processed foods. Check labels so you know how much sodium a food has. · Choose canned goods (soups, vegetables, and beans) that are low in sodium. Get regular exercise  · Get more exercise. Make sure your doctor knows when you start a new exercise program. Even small amounts of exercise will help you get stronger and have more energy. It can also help you manage your weight and your stress. · Walking is a good choice. Little by little, increase the amount you walk every day. Try for at least 30 minutes on most days of the week. Where can you learn more? Go to https://CR[employer]ERAPEUTIC[employer]Genetics Squared.[employer]. org and sign in to your [employer] account. Enter 957 [id number] in the Sunible box to learn more about \"Diet and Exercise for Metabolic Syndrome: Care Instructions. \"     If you do not have an account, please click on the \"Sign Up Now\" link. Current as of: August 21, 2019Content Version: 12.4  © 4748-0007 [employer]wise[employer]orporated. Care instructions adapted under license by [employer]. If you have questions about a medical condition or this instruction, always ask your healthcare professional. [place] [address] any warranty or liability for your use of this information.

## 2020-05-04 RX ORDER — LOSARTAN POTASSIUM 100 MG/1
100 TABLET ORAL DAILY
Qty: 90 TABLET | Refills: 3 | Status: SHIPPED | OUTPATIENT
Start: 2020-05-04 | End: 2020-11-10 | Stop reason: SDUPTHER

## 2020-05-04 RX ORDER — POTASSIUM CHLORIDE 20 MEQ/1
TABLET, EXTENDED RELEASE ORAL
Qty: 180 TABLET | Refills: 3 | Status: SHIPPED | OUTPATIENT
Start: 2020-05-04 | End: 2020-11-21 | Stop reason: SDUPTHER

## 2020-07-21 RX ORDER — PRAVASTATIN SODIUM 20 MG
TABLET ORAL
Qty: 90 TABLET | Refills: 3 | Status: SHIPPED | OUTPATIENT
Start: 2020-07-21 | End: 2021-06-23

## 2020-07-21 RX ORDER — HYDROCHLOROTHIAZIDE 25 MG/1
TABLET ORAL
Qty: 90 TABLET | Refills: 3 | Status: SHIPPED | OUTPATIENT
Start: 2020-07-21 | End: 2021-06-23

## 2020-10-26 ENCOUNTER — OFFICE VISIT (OUTPATIENT)
Dept: INTERNAL MEDICINE CLINIC | Age: 50
End: 2020-10-26
Payer: COMMERCIAL

## 2020-10-26 VITALS
HEIGHT: 62 IN | WEIGHT: 183 LBS | SYSTOLIC BLOOD PRESSURE: 120 MMHG | HEART RATE: 70 BPM | TEMPERATURE: 98.1 F | RESPIRATION RATE: 12 BRPM | DIASTOLIC BLOOD PRESSURE: 80 MMHG | BODY MASS INDEX: 33.68 KG/M2

## 2020-10-26 DIAGNOSIS — E78.2 HYPERLIPIDEMIA, MIXED: ICD-10-CM

## 2020-10-26 DIAGNOSIS — R73.01 IMPAIRED FASTING BLOOD SUGAR: ICD-10-CM

## 2020-10-26 DIAGNOSIS — Z00.00 ANNUAL PHYSICAL EXAM: ICD-10-CM

## 2020-10-26 DIAGNOSIS — I10 BENIGN ESSENTIAL HTN: ICD-10-CM

## 2020-10-26 LAB
A/G RATIO: 1.4 (ref 1.1–2.2)
ALBUMIN SERPL-MCNC: 4.3 G/DL (ref 3.4–5)
ALP BLD-CCNC: 66 U/L (ref 40–129)
ALT SERPL-CCNC: 21 U/L (ref 10–40)
ANION GAP SERPL CALCULATED.3IONS-SCNC: 9 MMOL/L (ref 3–16)
AST SERPL-CCNC: 22 U/L (ref 15–37)
BASOPHILS ABSOLUTE: 0.1 K/UL (ref 0–0.2)
BASOPHILS RELATIVE PERCENT: 1 %
BILIRUB SERPL-MCNC: 0.6 MG/DL (ref 0–1)
BUN BLDV-MCNC: 10 MG/DL (ref 7–20)
CALCIUM SERPL-MCNC: 10.1 MG/DL (ref 8.3–10.6)
CHLORIDE BLD-SCNC: 102 MMOL/L (ref 99–110)
CHOLESTEROL, TOTAL: 206 MG/DL (ref 0–199)
CO2: 29 MMOL/L (ref 21–32)
CREAT SERPL-MCNC: 0.7 MG/DL (ref 0.6–1.1)
EOSINOPHILS ABSOLUTE: 0.1 K/UL (ref 0–0.6)
EOSINOPHILS RELATIVE PERCENT: 1.2 %
GFR AFRICAN AMERICAN: >60
GFR NON-AFRICAN AMERICAN: >60
GLOBULIN: 3.1 G/DL
GLUCOSE BLD-MCNC: 102 MG/DL (ref 70–99)
HCT VFR BLD CALC: 39.5 % (ref 36–48)
HDLC SERPL-MCNC: 52 MG/DL (ref 40–60)
HEMOGLOBIN: 13.1 G/DL (ref 12–16)
LDL CHOLESTEROL CALCULATED: 124 MG/DL
LYMPHOCYTES ABSOLUTE: 2.3 K/UL (ref 1–5.1)
LYMPHOCYTES RELATIVE PERCENT: 46.7 %
MCH RBC QN AUTO: 30.6 PG (ref 26–34)
MCHC RBC AUTO-ENTMCNC: 33.1 G/DL (ref 31–36)
MCV RBC AUTO: 92.6 FL (ref 80–100)
MONOCYTES ABSOLUTE: 0.3 K/UL (ref 0–1.3)
MONOCYTES RELATIVE PERCENT: 6.6 %
NEUTROPHILS ABSOLUTE: 2.2 K/UL (ref 1.7–7.7)
NEUTROPHILS RELATIVE PERCENT: 44.5 %
PDW BLD-RTO: 12.7 % (ref 12.4–15.4)
PLATELET # BLD: 228 K/UL (ref 135–450)
PMV BLD AUTO: 9 FL (ref 5–10.5)
POTASSIUM SERPL-SCNC: 4.1 MMOL/L (ref 3.5–5.1)
RBC # BLD: 4.27 M/UL (ref 4–5.2)
SODIUM BLD-SCNC: 140 MMOL/L (ref 136–145)
TOTAL PROTEIN: 7.4 G/DL (ref 6.4–8.2)
TRIGL SERPL-MCNC: 150 MG/DL (ref 0–150)
TSH SERPL DL<=0.05 MIU/L-ACNC: 0.09 UIU/ML (ref 0.27–4.2)
VLDLC SERPL CALC-MCNC: 30 MG/DL
WBC # BLD: 5 K/UL (ref 4–11)

## 2020-10-26 PROCEDURE — 90686 IIV4 VACC NO PRSV 0.5 ML IM: CPT | Performed by: INTERNAL MEDICINE

## 2020-10-26 PROCEDURE — 90471 IMMUNIZATION ADMIN: CPT | Performed by: INTERNAL MEDICINE

## 2020-10-26 PROCEDURE — 99396 PREV VISIT EST AGE 40-64: CPT | Performed by: INTERNAL MEDICINE

## 2020-10-26 PROCEDURE — 93000 ELECTROCARDIOGRAM COMPLETE: CPT | Performed by: INTERNAL MEDICINE

## 2020-10-26 NOTE — PROGRESS NOTES
[facility]) Physicians  Internal Medicine  Patient Encounter  Geoff Watt D.O., [facility] Preventative Physical    Chief Complaint   Patient presents with    Annual Exam       HPI-- 51 y.o. female presents today requesting her annual preventative physical.      Medical/Surgical Histories     Past Medical History:   Diagnosis Date    Allergic rhinitis     Caesarean Section     7/10/94    Depression     well controlled    Dysplastic nevus 7/21/10    Hyperlipidemia     Hypertension     Impaired fasting glucose           Past Surgical History:   Procedure Laterality Date     SECTION      COLONOSCOPY  06/15/2018    Dr. Antonio Lr  2013    d&c hysteroscopy and polypectomy    HYSTERECTOMY, VAGINAL  2013    partial    LASIK  3/2015    Dr. Dani Blizzard EXTRACTION             Medications/Allergies     Medication Sig   hydroCHLOROthiazide (HYDRODIURIL) 25 MG tablet TAKE 1 TABLET DAILY   pravastatin (PRAVACHOL) 20 MG tablet TAKE 1 TABLET DAILY   losartan (COZAAR) 100 MG tablet Take 1 tablet by mouth daily   potassium chloride (KLOR-CON M) 20 MEQ extended release tablet TAKE ONE TABLET BY MOUTH TWICE A DAY   fexofenadine (ALLEGRA ALLERGY) 180 MG tablet Take 180 mg by mouth daily   Multiple Vitamins-Minerals (MULTIVITAMIN ADULT PO) Take 1 tablet by mouth daily   venlafaxine (EFFEXOR XR) 150 MG extended release capsule Take 1 capsule by mouth daily   meclizine (ANTIVERT) 25 MG tablet Take 1 tablet by mouth 3 times daily as needed .  Use as needed for vertigo          No Known Allergies      Substance Use History     Social History     Tobacco Use    Smoking status: Never Smoker    Smokeless tobacco: Never Used   Substance Use Topics    Alcohol use: No     Comment: rarely    Drug use: No          Family History     Family History   Problem Relation Age of Onset    High Blood Pressure Mother     Diabetes Mother     Thyroid Disease Mother     Hypertension Father     Colon Cancer Father 79        Stage IV, Mets to bone and nodes.  Prostate Cancer Father     Breast Cancer Maternal Aunt     Heart Disease Maternal Grandmother     Heart Failure Maternal Grandmother     Cancer Maternal Grandfather         Kidney, bladder    Heart Disease Paternal Grandmother     Heart Failure Paternal Grandmother               REVIEW OF SYSTEMS:    CONSTITUTIONAL:  Neg   Recent weight changes,  fever, chills or night sweats, anorexia.  + Snoring. No daytime sleepiness or drowsiness while driving. EYES: Neg  Blurry vision, loss of vision, double vision, tearing, itching, eye pain. EARS:  Neg Hearing loss, tinnitus, vertigo, discharge or otalgia. NOSE:  Neg  Rhinorrhea, sneezing, itching, allergy, epistaxis. +Snoring. MOUTH/THROAT:  Neg Bleeding gums, hoarseness, sore throat, dysphagia, throat infections, or dentures  RESPIRATORY:  Neg SOB ,wheeze, sputum, hemoptysis. No report of + TB test.  Cough occasionally  CARDIOVASCULAR:  Neg Chest pain, palpitations, heart murmur, dyspnea on exertion, orthopnea, paroxysmal nocturnal dyspnea or edema of extremities, or claudication. Home BP-- 134/87, 130/85, 133/82. GASTROINTESTINAL:  Neg   Nausea, vomiting, or diarrhea, constipation, hematemesis, heart burn, dysphagia,change in bowel movements or stool caliber, hematochezia, melena, abdominal pain, or food intolerance. Colonoscopy: Yes, 6/15/2018. GENITOURINARY:  Neg  Urinary frequency, hesitancy, urgency, polyuria, dysuria, hematuria, nocturia, incontinence, change in stream, genital pain or swelling, kidney stones, STD's. PAP/NAOMIE: Yes  HEMATOLOGIC/LYMPHATIC:  Neg  Anemia, bleeding dyscrasias, easy bruising, blood clots (DVT/PE), transfusions, or enlarged lymph nodes  MUSCULOSKELETAL:  Neg  New myalgias, bone pain, joint pain, joint swelling, low back pain,  Neck pain, radicular pain, or fractures.   NEUROLOGICAL:  Neg  Loss of Consciousness, memeory loss or forgetfulness, confusion, difficulty concentrating, seizures, insomina, aphasia or dysarthria unilateral weakness or  ataxia, syncope, tremor, or H/O head trauma.  + Daily headaches. She has been looking at her phone and then she has difficulty focusing. Headaches coincide with visual changes. Headaches are off and on. HA located frontally, pressure behind eyes. Right hand Numbness and tingling. Wakes at night shaking hand. PSYCHIATRIC:  Neg personality changes, nervousness, drug or alcohol use/abuse, H/O psych counseling: Yes, Psychotropics: Yes, Effexor XR. Depression and anxiety well controlled even after death of father this past July. SKIN :  Neg  Rash, nail changes, sun burns, tattoos, change in moles, or skin color changes  ENDOCRINE:  Neg  Polydipsia,polyuria,abnormal weight changes,heat /cold intolerance, Hair changes. No H/O Diabetes or Thyroid disease. Preventive Care:    Health Maintenance   Topic Date Due    Flu vaccine (1) 09/01/2020    A1C test (Diabetic or Prediabetic)  10/25/2020    Potassium monitoring  10/25/2020    Creatinine monitoring  10/25/2020    Lipid screen  10/25/2020    Colon cancer screen colonoscopy  06/15/2023    DTaP/Tdap/Td vaccine (2 - Td) 09/05/2024    HIV screen  Completed    Hepatitis A vaccine  Aged Out    Hepatitis B vaccine  Aged Out    Hib vaccine  Aged Out    Meningococcal (ACWY) vaccine  Aged Out    Pneumococcal 0-64 years Vaccine  Aged Out      Hx abnormal PAP: Once. Sexual activity: single partner, contraception - status post hysterectomy   Self-breast exams: yes  Last eye exam: 2019, normal, glasses  Exercise: no regular exercise  Seatbelt use: Yes  Sunscreen use:Yes  Dentist: [facility], every 6 months    Physical Exam    Vitals:    10/26/20 0827   BP: 120/80   Pulse: 70   Resp: 12   Temp: 98.1 °F (36.7 °C)   Weight: 183 lb (83 kg)   Height: 5' 2\" (1.575 m)     Body mass index is 33.47 kg/m².      Wt Readings from Last 3 Encounters:   10/26/20 183 lb (83 kg)   04/27/20 187 lb (84.8 kg)   10/25/19 182 lb (82.6 kg)     BP Readings from Last 3 Encounters:   10/26/20 120/80   04/27/20 125/81   10/25/19 120/70        GEN:  51 y.o. female who is in NAD, A&O. She appears stated age and well nourished. She is cooperative and pleasant. Overweight. Gen. he appears healthy  HEAD:  NC/AT, no lesions. EYES:  SASHA, EOMI, No scleral icterus or conjunctival injection or discharge. Visual fields in tact to confrontation. Fundoscopic difficult due to constricted pupils  EARS:  EAC's clear, TM's normal.  NOSE:  Nasal cavity is clear. No mucosal congestion or discharge. Sinuses are nontender. MOUTH & THROAT:  Oral cavity is clear without mucosal lesions. Tongue is midline. Dentition is in good repair. No pharyngeal erythema or exudate. NECK:  Supple. Full ROM. Trachea is midline. No increased JVD. No thyromegaly or nodules. No masses  LYMPH: No C/SC/A/F nodes  CARDIAC:  S1S2 NL. Regular rhythm. No murmur/clicks/rubs. No ectopy. PMI is non-displaced. VASC:  Pedal pulses 2/4. Carotid upstrokes 2+. No bruits noted. PULM:  Lungs are CTA. Symmetric breath sounds noted. AP Diameter NL. GI:  Abdomen is soft and nontender. No distension. No organomegaly. No masses. No pulsatile masses. EXT:  No Cyanosis or clubbing. No edema. SKIN: Warm and dry, normal turgor, no rash or lesions of concern. NEURO:  Cranial nerves 2-12 are NL. Speech fluent and coherent. Strength is 5/5 in all muscle groups. No sensory deficits. No focal or lateralizing deficits. Gait is normal.  BL achilles reflex now are symmetric.  + Tinel's right wrist.  Decrease light touch and pin prick sensation right median nerve distribution. MS:  No C/T/L paraspinal tenderness. No scoliosis. No joint effusions. Full joint ROM. PSYCH:  Mood and affect NL. Judgement and insight NL.        ASSESSMENT/PLAN:    1.  Annual physical exam  All care gaps identified and addressed  Flu vaccine updated today  Encourage patient to schedule her annual gynecologic examination  Encourage patient to consider a sleep study given her blood pressure readings at home and headaches  - EKG 12 Lead  - CBC Auto Differential; Future  - Comprehensive Metabolic Panel; Future  - Lipid Panel; Future  - TSH without Reflex; Future  - Hemoglobin A1C; Future    2. Flu vaccine need    - INFLUENZA, QUADV, 3 YRS AND OLDER, IM PF, PREFILL SYR OR SDV, 0.5ML (AFLURIA QUADV, PF)    3. Benign essential HTN  Blood pressure looks good here in the office today  Continue current medications for now  Continue lifestyle modification. Patient counseled on diet and exercise strategies  - CBC Auto Differential; Future  - Comprehensive Metabolic Panel; Future  - Lipid Panel; Future  - TSH without Reflex; Future    4. Hyperlipidemia, mixed    - Comprehensive Metabolic Panel; Future  - Lipid Panel; Future    5. Impaired fasting blood sugar    - Comprehensive Metabolic Panel; Future  - Hemoglobin A1C; Future    6. Metabolic syndrome  Lab work as above  Lifestyle modification recommendations as noted above    7. Visual disturbance  Recommend she obtain an updated eye exam with her eye doctor. 8. Paresthesias in right hand  Encourage patient to get an EMG-nerve conduction test to evaluate for carpal tunnel syndrome  - EMG; Future  - Nerve conduction test; Future    9. Snoring  Encourage patient to consider a sleep study. Patient counseled on sleep apnea. Preventive plan of care for [place]        10/26/2020           Preventive Measures Status       Recommendations for screening   Colon Cancer Screen   Last colonoscopy: 6/15/2018 Colonoscopy due every 5 years--due 2023   Breast Cancer Screen  Last mammogram: 5/19/2020 Repeat yearly   Cervical Cancer Screen   Last PAP smear: 2015 Repeat screening is not clinically indicated Due to hysterectomy status.   Please visit with her gynecologist for an annual breast and pelvic exam--Please schedule. Osteoporosis Screen   Last DXA scan: None This test is not clinically indicated   Diabetes Screen  Glucose (mg/dL)   Date Value   10/25/2019 96    Test recommended and ordered   Cholesterol Screen  Lab Results   Component Value Date    CHOL 219 (H) 10/25/2019    TRIG 203 (H) 10/25/2019    HDL 51 10/25/2019    LDLCALC 127 (H) 10/25/2019    Test recommended and ordered   Hepatitis C screening recommended for those born between [facility]-- not on file   Clinically not indicated   HIV screening recommended for those ages 12-76 NO MATTER THE RISK FOR HIV--  10/7/2016 No need to repeat at this time   Aspirin for Cardiovascular Prevention   No Not indicated    Recommended Immunizations    Immunization History   Administered Date(s) Administered    Influenza 11/05/2010, 09/24/2013    Influenza Virus Vaccine 09/05/2014, 10/05/2015    Influenza, Quadv, IM, PF (6 mo and older Fluzone, Flulaval, Fluarix, and 3 yrs and older Afluria) 10/25/2019    PPD Test 01/31/2006    Tdap (Boostrix, Adacel) 09/05/2014    Tetanus 01/31/2006        Influenza vaccine:  recommended every fall--Given today    Pneumonia vaccine: Pneumovax Due at age 72    Tetanus vaccine:  tetanus and diptheria/Pertussis vaccine (Td/Tdap) recommended every 10 years- Td due 2024     Shingles vaccine:  Shingrx due at age 48         Additional Recommendations   1. Use Sunscreen daily to help reduce the risk of skin cancer. 2. Continue a healthy lifestyle including a low fat and low carb diet along with regular aerobic exercise   3. Please schedule an eye exam.    4. Always wear a seatbelt while in a car  5. Consider sleep study if snoring, headaches, sleepiness continue  6. Consider getting nerve test for carpal Tunnel Syndrome  7.  Return sooner with new problems or concerns      Here are a few  Reliable websites with a variety of health and wellness information: [email or web address] [email or web address] org     www.nutritionsource. org     www. americanheart. org     www. diabetes. org      www.menopause. org     [email or web address]     [email or web address])

## 2020-10-26 NOTE — PROGRESS NOTES
Vaccine Information Sheet, \"Influenza - Inactivated\"  given to Eloy Peña, or parent/legal guardian of  Eloy Peña and verbalized understanding. Patient responses:    Have you ever had a reaction to a flu vaccine? No  Do you have any current illness? No  Have you ever had Guillian Malden Syndrome? No  Do you have a serious allergy to any of the follow: Neomycin, Polymyxin, Thimerosal, eggs or egg products? No    Flu vaccine given per order. Please see immunization tab. Risks and benefits explained. Current VIS given.       Immunizations Administered     Name Date Dose Route    Influenza, Quadv, IM, PF (6 mo and older Fluzone, Flulaval, Fluarix, and 3 yrs and older Afluria) 10/26/2020 0.5 mL Intramuscular    Site: Deltoid- Left    Lot: [id number]    NDC: 44344-862-81

## 2020-10-26 NOTE — PATIENT INSTRUCTIONS
Preventive plan of care for Rosaline Osler        10/26/2020           Preventive Measures Status       Recommendations for screening   Colon Cancer Screen   Last colonoscopy: 6/15/2018 Colonoscopy due every 5 years--due 2023   Breast Cancer Screen  Last mammogram: 5/19/2020 Repeat yearly   Cervical Cancer Screen   Last PAP smear: 2015 Repeat screening is not clinically indicated Due to hysterectomy status. Please visit with her gynecologist for an annual breast and pelvic exam--Please schedule. Osteoporosis Screen   Last DXA scan: None This test is not clinically indicated   Diabetes Screen  Glucose (mg/dL)   Date Value   10/25/2019 96    Test recommended and ordered   Cholesterol Screen  Lab Results   Component Value Date    CHOL 219 (H) 10/25/2019    TRIG 203 (H) 10/25/2019    HDL 51 10/25/2019    LDLCALC 127 (H) 10/25/2019    Test recommended and ordered   Hepatitis C screening recommended for those born between [facility]-- not on file   Clinically not indicated   HIV screening recommended for those ages 12-76 NO MATTER THE RISK FOR HIV--  10/7/2016 No need to repeat at this time   Aspirin for Cardiovascular Prevention   No Not indicated    Recommended Immunizations    Immunization History   Administered Date(s) Administered    Influenza 11/05/2010, 09/24/2013    Influenza Virus Vaccine 09/05/2014, 10/05/2015    Influenza, Quadv, IM, PF (6 mo and older Fluzone, Flulaval, Fluarix, and 3 yrs and older Afluria) 10/25/2019    PPD Test 01/31/2006    Tdap (Boostrix, Adacel) 09/05/2014    Tetanus 01/31/2006        Influenza vaccine:  recommended every fall--Given today    Pneumonia vaccine: Pneumovax Due at age 72    Tetanus vaccine:  tetanus and diptheria/Pertussis vaccine (Td/Tdap) recommended every 10 years- Td due 2024     Shingles vaccine:  Shingrx due at age 48         Additional Recommendations   1. Use Sunscreen daily to help reduce the risk of skin cancer.   2. Continue a healthy lifestyle including a low fat and low carb diet along with regular aerobic exercise   3. Please schedule an eye exam.    4. Always wear a seatbelt while in a car  5. Consider sleep study if snoring, headaches, sleepiness continue  6. Consider getting nerve test for carpal Tunnel Syndrome  7. Return sooner with new problems or concerns        Here are a few  Reliable websites with a variety of health and wellness information:   www.mylifecheck. heart. org     www.nutritionsource. org     www. americanheart. org     www. diabetes. org      www.menopause. org     www.[facility]     www.[place] ([facility] site)            Patient Education        DASH Diet: Care Instructions  Your Care Instructions     The DASH diet is an eating plan that can help lower your blood pressure. DASH stands for Dietary Approaches to Stop Hypertension. Hypertension is high blood pressure. The DASH diet focuses on eating foods that are high in calcium, potassium, and magnesium. These nutrients can lower blood pressure. The foods that are highest in these nutrients are fruits, vegetables, low-fat dairy products, nuts, seeds, and legumes. But taking calcium, potassium, and magnesium supplements instead of eating foods that are high in those nutrients does not have the same effect. The DASH diet also includes whole grains, fish, and poultry. The DASH diet is one of several lifestyle changes your doctor may recommend to lower your high blood pressure. Your doctor may also want you to decrease the amount of sodium in your diet. Lowering sodium while following the DASH diet can lower blood pressure even further than just the DASH diet alone. Follow-up care is a key part of your treatment and safety. Be sure to make and go to all appointments, and call your doctor if you are having problems. It's also a good idea to know your test results and keep a list of the medicines you take. How can you care for yourself at home?   Following the [facility] diet  · Eat 4 to 5 servings of fruit each day. A serving is 1 medium-sized piece of fruit, ½ cup chopped or canned fruit, 1/4 cup dried fruit, or 4 ounces (½ cup) of fruit juice. Choose fruit more often than fruit juice. · Eat 4 to 5 servings of vegetables each day. A serving is 1 cup of lettuce or raw leafy vegetables, ½ cup of chopped or cooked vegetables, or 4 ounces (½ cup) of vegetable juice. Choose vegetables more often than vegetable juice. · Get 2 to 3 servings of low-fat and fat-free dairy each day. A serving is 8 ounces of milk, 1 cup of yogurt, or 1 ½ ounces of cheese. · Eat 6 to 8 servings of grains each day. A serving is 1 slice of bread, 1 ounce of dry cereal, or ½ cup of cooked rice, pasta, or cooked cereal. Try to choose whole-grain products as much as possible. · Limit lean meat, poultry, and fish to 2 servings each day. A serving is 3 ounces, about the size of a deck of cards. · Eat 4 to 5 servings of nuts, seeds, and legumes (cooked dried beans, lentils, and split peas) each week. A serving is 1/3 cup of nuts, 2 tablespoons of seeds, or ½ cup of cooked beans or peas. · Limit fats and oils to 2 to 3 servings each day. A serving is 1 teaspoon of vegetable oil or 2 tablespoons of salad dressing. · Limit sweets and added sugars to 5 servings or less a week. A serving is 1 tablespoon jelly or jam, ½ cup sorbet, or 1 cup of lemonade. · Eat less than 2,300 milligrams (mg) of sodium a day. If you limit your sodium to 1,500 mg a day, you can lower your blood pressure even more. Tips for success  · Start small. Do not try to make dramatic changes to your diet all at once. You might feel that you are missing out on your favorite foods and then be more likely to not follow the plan. Make small changes, and stick with them. Once those changes become habit, add a few more changes. · Try some of the following:  ? Make it a goal to eat a fruit or vegetable at every meal and at snacks.  This will make it easy to get the recommended amount of fruits and vegetables each day. ? Try yogurt topped with fruit and nuts for a snack or healthy dessert. ? Add lettuce, tomato, cucumber, and onion to sandwiches. ? Combine a ready-made pizza crust with low-fat mozzarella cheese and lots of vegetable toppings. Try using tomatoes, squash, spinach, broccoli, carrots, cauliflower, and onions. ? Have a variety of cut-up vegetables with a low-fat dip as an appetizer instead of chips and dip. ? Sprinkle sunflower seeds or chopped almonds over salads. Or try adding chopped walnuts or almonds to cooked vegetables. ? Try some vegetarian meals using beans and peas. Add garbanzo or kidney beans to salads. Make burritos and tacos with mashed marinelli beans or black beans. Where can you learn more? Go to https://DigitalAdvisorpeGondola.eMindful. org and sign in to your PeekYou account. Enter Q398 in the [employer] box to learn more about \"DASH Diet: Care Instructions. \"     If you do not have an account, please click on the \"Sign Up Now\" link. Current as of: December 16, 2019               Content Version: 12.6  © 5137-4453 [employer]. Care instructions adapted under license by [facility] ([facility]). If you have questions about a medical condition or this instruction, always ask your healthcare professional. [place] [address] any warranty or liability for your use of this information. Patient Education        Insulin Resistance: Care Instructions  Your Care Instructions     Insulin resistance means that the body can't use insulin as it should. Insulin lets sugar (glucose) enter the body's cells, where it is used for energy. It also helps muscles, fat, and liver cells store sugar to be released when needed. If the body tissues don't respond to insulin right, the blood sugar level rises. Insulin resistance mainly is caused by obesity.  But other medical conditions, such as acromegaly and Cushing's syndrome, also can cause it. It can run in families too. Follow-up care is a key part of your treatment and safety. Be sure to make and go to all appointments, and call your doctor if you are having problems. It's also a good idea to know your test results and keep a list of the medicines you take. How can you care for yourself at home? · Take your medicines exactly as prescribed. Call your doctor if you think you are having a problem with your medicine. You will get more details on the specific medicines your doctor prescribes. · Eat a good diet that spreads carbohydrates throughout the day. · Get at least 30 minutes of exercise on most days of the week. Exercise helps control your blood sugar. It also helps you stay at a healthy weight. Walking is a good choice. You also may want to do other activities, such as running, swimming, cycling, or playing tennis or team sports. · Try to lose weight. Losing even a small amount of weight can help. · Do not smoke. If you need help quitting, talk to your doctor about stop-smoking programs and medicines. These can increase your chances of quitting for good. When should you call for help? Watch closely for changes in your health, and be sure to contact your doctor if:    · Your blood sugar stays outside the level your doctor set for you.     · You have any problems. Where can you learn more? Go to [email or web address].Tarana [email or web address] org and sign in to your AxesNetwork account. Enter [phone] [id number] [phone] in the [facility] box to learn more about \"Insulin Resistance: Care Instructions. \"     If you do not have an account, please click on the \"Sign Up Now\" link. Current as of: December 20, 2019               Content Version: 12.6  © 8083-6995 "[employer]. Care instructions adapted under license by [facility] ([facility]).  If you have questions about a medical condition or this instruction, always ask your healthcare professional. [address] any warranty or liability for your use of this information. Patient Education        Influenza (Flu) Vaccine (Inactivated or Recombinant): What You Need to Know  Why get vaccinated? Influenza vaccine can prevent influenza (flu). Flu is a contagious disease that spreads around the [place] every year, usually between October and May. Anyone can get the flu, but it is more dangerous for some people. Infants and young children, people 72years of age and older, pregnant women, and people with certain health conditions or a weakened immune system are at greatest risk of flu complications. Pneumonia, bronchitis, sinus infections and ear infections are examples of flu-related complications. If you have a medical condition, such as heart disease, cancer or diabetes, flu can make it worse. Flu can cause fever and chills, sore throat, muscle aches, fatigue, cough, headache, and runny or stuffy nose. Some people may have vomiting and diarrhea, though this is more common in children than adults. Each year, thousands of people in the [facility] die from flu, and many more are hospitalized. Flu vaccine prevents millions of illnesses and flu-related visits to the doctor each year. Influenza vaccine  CDC recommends everyone 10months of age and older get vaccinated every flu season. Children 6 months through 6years of age may need 2 doses during a single flu season. Everyone else needs only 1 dose each flu season. It takes about 2 weeks for protection to develop after vaccination. There are many flu viruses, and they are always changing. Each year a new flu vaccine is made to protect against three or four viruses that are likely to cause disease in the upcoming flu season. Even when the vaccine doesn't exactly match these viruses, it may still provide some protection. Influenza vaccine does not cause flu. Influenza vaccine may be given at the same time as other vaccines.   Talk with your health care provider  Tell your vaccine provider if the person getting the vaccine:  · Has had an allergic reaction after a previous dose of influenza vaccine, or has any severe, life-threatening allergies. · Has ever had Guillain-Barré Syndrome (also called GBS). In some cases, your health care provider may decide to postpone influenza vaccination to a future visit. People with minor illnesses, such as a cold, may be vaccinated. People who are moderately or severely ill should usually wait until they recover before getting influenza vaccine. Your health care provider can give you more information. Risks of a vaccine reaction  · Soreness, redness, and swelling where shot is given, fever, muscle aches, and headache can happen after influenza vaccine. · There may be a very small increased risk of Guillain-Barré Syndrome (GBS) after inactivated influenza vaccine (the flu shot). [employer] children who get the flu shot along with pneumococcal vaccine (PCV13), and/or DTaP vaccine at the same time might be slightly more likely to have a seizure caused by fever. Tell your health care provider if a child who is getting flu vaccine has ever had a seizure. People sometimes faint after medical procedures, including vaccination. Tell your provider if you feel dizzy or have vision changes or ringing in the ears. As with any medicine, there is a very remote chance of a vaccine causing a severe allergic reaction, other serious injury, or death. What if there is a serious problem? An allergic reaction could occur after the vaccinated person leaves the clinic. If you see signs of a severe allergic reaction (hives, swelling of the face and throat, difficulty breathing, a fast heartbeat, dizziness, or weakness), call 9-1-1 and get the person to the nearest hospital.  For other signs that concern you, call your health care provider. Adverse reactions should be reported to the Vaccine Adverse Event Reporting System (VAERS).  Your health care provider will usually file this report, or you can do it yourself. Visit the VAERS website at www.vaers. [facility].gov or call 3-371.765.8917. VAERS is only for reporting reactions, and [facility] staff do not give medical advice. The National Vaccine Injury Compensation Program  The National Vaccine Injury Compensation Program (VICP) is a federal program that was created to compensate people who may have been injured by certain vaccines. Visit the VICP website at www.[facility].gov/vaccinecompensation or call 0-495.669.8196 to learn about the program and about filing a claim. There is a time limit to file a claim for compensation. How can I learn more? · Ask your healthcare provider. · Call your local or state health department. · Contact the Centers for Disease Control and Prevention (CDC):  ? Call 3-559.170.5945 (1-800-CDC-INFO) or  ? Visit CDC's website at www.cdc.gov/flu  Vaccine Information Statement (Interim)  Inactivated Influenza Vaccine  8/15/2019  43 JERRY Rodrigozaknaomi Keena [id number]  Department of Health and Human Services  Centers for Disease Control and Prevention  Many Vaccine Information Statements are available in [place]ral[place] and other languages. See www.immunize.org/vis. Muchas hojas de información sobre vacunas están disponibles en español y en otros idiomas. Visite www.immunize.org/vis. Care instructions adapted under license by [facility] ([facility]). If you have questions about a medical condition or this instruction, always ask your healthcare professional. [place] [address] any warranty or liability for your use of this information. Patient Education        Numbness and Tingling: Care Instructions  Your Care Instructions     Many things can cause numbness or tingling. Swelling may put pressure on a nerve. This could cause you to lose feeling or have a pins-and-needles sensation on part of your body. Nerves may be damaged from trauma, toxins, or diseases, such as diabetes or multiple sclerosis (MS). Sometimes, though, the cause is not clear. If there is no clear reason for your symptoms, and you are not having any other symptoms, your doctor may suggest watching and waiting for a while to see if the numbness or tingling goes away on its own. Your doctor may want you to have blood or nerve tests to find the cause of your symptoms. Follow-up care is a key part of your treatment and safety. Be sure to make and go to all appointments, and call your doctor if you are having problems. It's also a good idea to know your test results and keep a list of the medicines you take. How can you care for yourself at home? · If your doctor prescribes medicine, take it exactly as directed. Call your doctor if you think you are having a problem with your medicine. · If you have any swelling, put ice or a cold pack on the area for 10 to 20 minutes at a time. Put a thin cloth between the ice and your skin. When should you call for help? Call 911 anytime you think you may need emergency care. For example, call if:    · You have weakness, numbness, or tingling in both legs.     · You lose bowel or bladder control.     · You have symptoms of a stroke. These may include:  ? Sudden numbness, tingling, weakness, or loss of movement in your face, arm, or leg, especially on only one side of your body. ? Sudden vision changes. ? Sudden trouble speaking. ? Sudden confusion or trouble understanding simple statements. ? Sudden problems with walking or balance. ? A sudden, severe headache that is different from past headaches. Watch closely for changes in your health, and be sure to contact your doctor if you have any problems, or if:    · You do not get better as expected. Where can you learn more? Go to https://Girltankcharmaine.Plugaround. org and sign in to your [employer] account. Enter W090 in the [employer] box to learn more about \"Numbness and Tingling: Care Instructions. \"     If you do not have an account, please click on the \"Sign Up Now\" link. Current as of: November 20, 2019               Content Version: 12.6  © 2564-6619 Boundless [employer]. Care instructions adapted under license by [facility] ([facility]). If you have questions about a medical condition or this instruction, always ask your healthcare professional. [address] any warranty or liability for your use of this information. Patient Education        Diet and Exercise for Metabolic Syndrome: Care Instructions  Your Care Instructions     Metabolic syndrome is the name for a group of health problems. It includes having too much fat around your waist and high blood pressure. It also includes high triglycerides, high blood sugar, and low levels of healthy (HDL) cholesterol. These problems make it more likely you will have a heart attack or stroke or get diabetes. Your family history (your genes) can cause metabolic syndrome. So can unhealthy eating habits and not getting enough exercise. You can help lower your risk of heart attack, stroke, and diabetes if you eat healthy foods and get more exercise. It may be hard to make these lifestyle changes. But even small changes can help. Follow-up care is a key part of your treatment and safety. Be sure to make and go to all appointments, and call your doctor if you are having problems. It's also a good idea to know your test results and keep a list of the medicines you take. How can you care for yourself at home? Eat more fruits and vegetables  · Fruits and vegetables have nutrients to help protect you from heart disease and high blood pressure. They are low in fat and high in fiber. Dark green, orange, and yellow ones are the healthiest.  · Keep lots of vegetables ready for snacks. · Buy fruit that is in season. Then put it where you can see it so you will want to eat it. · Cook dishes that have a lot of vegetables. Soups and stir-fries are good choices.   Limit saturated and and your stress. · Walking is a good choice. Little by little, increase the amount you walk every day. Try for at least 30 minutes on most days of the week. Where can you learn more? Go to https://[email or web address].healthBeijing Suplet [email or web address]. org and sign in to your Virtual Gaming Worlds account. Enter 619 [id number] in the [facility] box to learn more about \"Diet and Exercise for Metabolic Syndrome: Care Instructions. \"     If you do not have an account, please click on the \"Sign Up Now\" link. Current as of: August 22, 2019               Content Version: 12.6  © 8521-6554 [employer]. Care instructions adapted under license by [facility] ([facility]). If you have questions about a medical condition or this instruction, always ask your healthcare professional. [address] any warranty or liability for your use of this information. Patient Education        Snoring: Care Instructions  Your Care Instructions  Snoring is a noise that you may make while breathing during sleep. You snore when the flow of air from your mouth or nose to your lungs makes the tissues of your throat vibrate while you sleep. This usually is caused by a blockage or narrowing in your nose, mouth, or throat (airway). Snoring can be soft, loud, raspy, harsh, hoarse, or fluttering. Your bed partner may notice that you sleep with your mouth open and that you are restless while sleeping. If snoring interferes with your or your bed partner's sleep, either or both of you may feel tired during the day. You may be able to help reduce your snoring by making changes in your activities and in the way you sleep. Follow-up care is a key part of your treatment and safety. Be sure to make and go to all appointments, and call your doctor if you are having problems. It's also a good idea to know your test results and keep a list of the medicines you take. How can you care for yourself at home? · Lose weight, if needed.  Many people who snore are account, please click on the \"Sign Up Now\" link. Current as of: February 24, 2020               Content Version: 12.6  © 2006-2020 [employer]. Care instructions adapted under license by [facility] ([facility]). If you have questions about a medical condition or this instruction, always ask your healthcare professional. [address] any warranty or liability for your use of this information. Patient Education        Well Visit, Ages 25 to 48: Care Instructions  Your Care Instructions     Physical exams can help you stay healthy. Your doctor has checked your overall health and may have suggested ways to take good care of yourself. He or she also may have recommended tests. At home, you can help prevent illness with healthy eating, regular exercise, and other steps. Follow-up care is a key part of your treatment and safety. Be sure to make and go to all appointments, and call your doctor if you are having problems. It's also a good idea to know your test results and keep a list of the medicines you take. How can you care for yourself at home? · Reach and stay at a healthy weight. This will lower your risk for many problems, such as obesity, diabetes, heart disease, and high blood pressure. · Get at least 30 minutes of physical activity on most days of the week. Walking is a good choice. You also may want to do other activities, such as running, swimming, cycling, or playing tennis or team sports. Discuss any changes in your exercise program with your doctor. · Do not smoke or allow others to smoke around you. If you need help quitting, talk to your doctor about stop-smoking programs and medicines. These can increase your chances of quitting for good. · Talk to your doctor about whether you have any risk factors for sexually transmitted infections (STIs).  Having one sex partner (who does not have STIs and does not have sex with anyone else) is a good way to avoid these is part of a pelvic exam. Starting at age 27, you may choose to have a Pap test, an HPV test, or both tests at the same time (called co-testing). Talk with your doctor about how often to have testing. · Tests for sexually transmitted infections (STIs). Ask whether you should have tests for STIs. You may be at risk if you have sex with more than one person, especially if your partners do not wear condoms. For men  · Tests for sexually transmitted infections (STIs). Ask whether you should have tests for STIs. You may be at risk if you have sex with more than one person, especially if you do not wear a condom. · Testicular cancer exam. Ask your doctor whether you should check your testicles regularly. · Prostate exam. Talk to your doctor about whether you should have a blood test (called a PSA test) for prostate cancer. Experts differ on whether and when men should have this test. Some experts suggest it if you are older than 39 and are -American or have a father or brother who got prostate cancer when he was younger than 72. When should you call for help? Watch closely for changes in your health, and be sure to contact your doctor if you have any problems or symptoms that concern you. Where can you learn more? Go to [email or web address] and sign in to your [employer] account. Enter P072 in the [facility] box to learn more about \"Well Visit, Ages 25 to 48: Care Instructions. \"     If you do not have an account, please click on the \"Sign Up Now\" link. Current as of: May 27, 2020               Content Version: 12.6  © 3707-9318 [employer]. Care instructions adapted under license by [facility] [address] If you have questions about a medical condition or this instruction, always ask your healthcare professional. [place] [address] any warranty or liability for your use of this information.

## 2020-10-27 LAB
ESTIMATED AVERAGE GLUCOSE: 116.9 MG/DL
HBA1C MFR BLD: 5.7 %

## 2020-11-10 RX ORDER — LOSARTAN POTASSIUM 100 MG/1
100 TABLET ORAL DAILY
Qty: 90 TABLET | Refills: 3 | Status: SHIPPED | OUTPATIENT
Start: 2020-11-10 | End: 2021-06-23

## 2020-11-10 RX ORDER — VENLAFAXINE HYDROCHLORIDE 150 MG/1
150 CAPSULE, EXTENDED RELEASE ORAL DAILY
Qty: 90 CAPSULE | Refills: 3 | Status: SHIPPED | OUTPATIENT
Start: 2020-11-10 | End: 2021-11-09 | Stop reason: SDUPTHER

## 2020-11-20 LAB
T3 FREE: 4.97 PG/ML (ref 2–4.4)
T4 FREE: 1.28 NG/DL (ref 0.8–1.8)
THYROID PEROXIDASE ANTIBODIES: <3 IU/ML
TSH ULTRASENSITIVE: 0.01 MCIU/ML (ref 0.27–4.2)

## 2020-11-22 LAB — T4 TOTAL: 9.1 UG/DL (ref 5.1–14.1)

## 2020-11-23 LAB
HB: SOURCE: NORMAL
IDENTIFICATION: NORMAL
LAB: NORMAL
Lab: NORMAL
TEST NAME: NORMAL

## 2020-11-23 RX ORDER — POTASSIUM CHLORIDE 20 MEQ/1
TABLET, EXTENDED RELEASE ORAL
Qty: 180 TABLET | Refills: 3 | Status: SHIPPED | OUTPATIENT
Start: 2020-11-23 | End: 2021-05-14

## 2020-12-30 ENCOUNTER — OFFICE VISIT (OUTPATIENT)
Dept: ENDOCRINOLOGY | Age: 50
End: 2020-12-30
Payer: COMMERCIAL

## 2020-12-30 VITALS
SYSTOLIC BLOOD PRESSURE: 120 MMHG | WEIGHT: 179 LBS | RESPIRATION RATE: 14 BRPM | HEIGHT: 62 IN | OXYGEN SATURATION: 98 % | BODY MASS INDEX: 32.94 KG/M2 | DIASTOLIC BLOOD PRESSURE: 84 MMHG | HEART RATE: 128 BPM

## 2020-12-30 PROCEDURE — 99243 OFF/OP CNSLTJ NEW/EST LOW 30: CPT | Performed by: INTERNAL MEDICINE

## 2020-12-30 RX ORDER — PROPRANOLOL HCL 60 MG
60 CAPSULE, EXTENDED RELEASE 24HR ORAL DAILY
Qty: 30 CAPSULE | Refills: 3 | Status: SHIPPED | OUTPATIENT
Start: 2020-12-30 | End: 2020-12-30 | Stop reason: SDUPTHER

## 2020-12-30 RX ORDER — PROPRANOLOL HCL 60 MG
60 CAPSULE, EXTENDED RELEASE 24HR ORAL DAILY
Qty: 30 CAPSULE | Refills: 3 | Status: SHIPPED | OUTPATIENT
Start: 2020-12-30 | End: 2021-04-15 | Stop reason: SDUPTHER

## 2020-12-30 NOTE — PROGRESS NOTES
Subjective:      52 y/o WF who is here for thyroid evaluation.      Referred by Dr. Annie Wong    She was diagnosed with hyperthyroidism recently     Had work up done    No neck pain, fever chills     TSH 0.007 FT3 4.97  FT4 1.28  TSI 1.57  10/20 TSH 0.09  10/19 TSH 1.35     Thyroid scan:     FINDINGS:    THYROID APPEARANCE:  Normal size and configuration with normal homogenous uptake   4 HOUR THYROID IODINE UPTAKE:  12.9% ( normal is 6-18%)   24 HOUR THYROID IODINE UPTAKE:  33.2% (normal is 10-35%)     Normal USG    Current complaints: headache, palpitations, reports fast heart rate , memory fog, tremors, weight loss 14 lb  Symptoms for a few weeks  Palpitations for months  No eye symptoms  History of obstructive symptoms:  difficulty swallowing no    History of radiation to patient's neck: no  Recent iodine exposure: no  Family history includes Mom Hashimoto's hypothyroidism/ maternal aunt  Family history of thyroid cancer: no    She has HTN, takes losartan and HCTZ    Past Medical History:   Diagnosis Date    Allergic rhinitis     Caesarean Section     7/10/94    Depression     well controlled    Dysplastic nevus 7/21/10    Hyperlipidemia     Hypertension     Impaired fasting glucose      Past Surgical History:   Procedure Laterality Date     SECTION      COLONOSCOPY  06/15/2018    Dr. Tamra Curiel  2013    d&c hysteroscopy and polypectomy    HYSTERECTOMY, VAGINAL  2013    partial    LASIK  3/2015    Dr. Grupo Ghotra EXTRACTION       Current Outpatient Medications   Medication Sig Dispense Refill    potassium chloride (KLOR-CON M) 20 MEQ extended release tablet TAKE ONE TABLET BY MOUTH TWICE A  tablet 3    venlafaxine (EFFEXOR XR) 150 MG extended release capsule Take 1 capsule by mouth daily 90 capsule 3    losartan (COZAAR) 100 MG tablet Take 1 tablet by mouth daily 90 tablet 3 Hyperthyroidism: Mild, thyroid scan high normal uptake, TSI negative. USG is unremarkable. Likely seronegative Graves' disease. Will recheck levels today and decide dose for tapazole. Discussed alternative Tx with CRUZ  Given Symptoms start on Inderal  Discussed side effects including rash and elevated LFT with tapazole    HTN:    Plan: 1. Check TFT, Trab  2. Start tapazole based on results  3. F/u in 6 weeks

## 2021-01-01 RX ORDER — METHIMAZOLE 10 MG/1
TABLET ORAL
Qty: 30 TABLET | Refills: 3 | Status: SHIPPED | OUTPATIENT
Start: 2021-01-01 | End: 2021-04-15 | Stop reason: SDUPTHER

## 2021-02-17 ENCOUNTER — OFFICE VISIT (OUTPATIENT)
Dept: ENDOCRINOLOGY | Age: 51
End: 2021-02-17
Payer: COMMERCIAL

## 2021-02-17 VITALS
SYSTOLIC BLOOD PRESSURE: 107 MMHG | BODY MASS INDEX: 33.09 KG/M2 | RESPIRATION RATE: 18 BRPM | HEART RATE: 68 BPM | WEIGHT: 179.8 LBS | OXYGEN SATURATION: 97 % | DIASTOLIC BLOOD PRESSURE: 67 MMHG | HEIGHT: 62 IN

## 2021-02-17 DIAGNOSIS — E05.90 HYPERTHYROIDISM: ICD-10-CM

## 2021-02-17 DIAGNOSIS — E05.90 HYPERTHYROIDISM: Primary | ICD-10-CM

## 2021-02-17 DIAGNOSIS — E05.00 GRAVES DISEASE: ICD-10-CM

## 2021-02-17 LAB
ALBUMIN SERPL-MCNC: 4.3 G/DL (ref 3.4–5)
ALP BLD-CCNC: 75 U/L (ref 40–129)
ALT SERPL-CCNC: 16 U/L (ref 10–40)
AST SERPL-CCNC: 17 U/L (ref 15–37)
BILIRUB SERPL-MCNC: 0.6 MG/DL (ref 0–1)
BILIRUBIN DIRECT: <0.2 MG/DL (ref 0–0.3)
BILIRUBIN, INDIRECT: NORMAL MG/DL (ref 0–1)
T3 FREE: 4.2 PG/ML (ref 2.3–4.2)
T3 TOTAL: 1.72 NG/ML (ref 0.8–2)
T4 FREE: 1.2 NG/DL (ref 0.9–1.8)
TOTAL PROTEIN: 7.6 G/DL (ref 6.4–8.2)
TSH REFLEX: <0.01 UIU/ML (ref 0.27–4.2)

## 2021-02-17 PROCEDURE — 99213 OFFICE O/P EST LOW 20 MIN: CPT | Performed by: INTERNAL MEDICINE

## 2021-02-17 NOTE — PROGRESS NOTES
Subjective:      54 y/o WF who is here for thyroid evaluation.      Referred by Dr. Guerita Bee    Interim: Suresh Javier chiu    She was diagnosed with hyperthyroidism recently     Had work up done    No neck pain, fever chills     TSH 0.007 FT3 4.97  FT4 1.28  TSI 1.57  10/20 TSH 0.09  10/19 TSH 1.35     Thyroid scan:     FINDINGS:    THYROID APPEARANCE:  Normal size and configuration with normal homogenous uptake   4 HOUR THYROID IODINE UPTAKE:  12.9% ( normal is 6-18%)   24 HOUR THYROID IODINE UPTAKE:  33.2% (normal is 10-35%)     Normal USG    Initial complaints: headache, palpitations, reports fast heart rate , memory fog, tremors, weight loss 14 lb, heat intolerance    Symptoms for a few weeks  Palpitations for months  No eye symptoms  History of obstructive symptoms:  difficulty swallowing no    History of radiation to patient's neck: no  Recent iodine exposure: no  Family history includes Mom Hashimoto's hypothyroidism/ maternal aunt  Family history of thyroid cancer: no    TSH <0.01 FT4 1.8 FT3 7.8    Start tapazole 10mg , inderal daily    She has HTN, takes losartan and HCTZ    Past Medical History:   Diagnosis Date    Allergic rhinitis     Caesarean Section     7/10/94    Depression     well controlled    Dysplastic nevus 7/21/10    Hyperlipidemia     Hypertension     Impaired fasting glucose      Past Surgical History:   Procedure Laterality Date     SECTION      COLONOSCOPY  06/15/2018    Dr. Jaren Jones  2013    d&c hysteroscopy and polypectomy    HYSTERECTOMY, VAGINAL  2013    partial    LASIK  3/2015    Dr. Pamela Cancino EXTRACTION       Current Outpatient Medications   Medication Sig Dispense Refill    methIMAzole (TAPAZOLE) 10 MG tablet One tab PO daily 30 tablet 3    propranolol (INDERAL LA) 60 MG extended release capsule Take 1 capsule by mouth daily 30 capsule 3    potassium chloride (KLOR-CON M) 20 MEQ extended release tablet TAKE ONE TABLET BY MOUTH TWICE A  tablet 3    venlafaxine (EFFEXOR XR) 150 MG extended release capsule Take 1 capsule by mouth daily 90 capsule 3    losartan (COZAAR) 100 MG tablet Take 1 tablet by mouth daily 90 tablet 3    hydroCHLOROthiazide (HYDRODIURIL) 25 MG tablet TAKE 1 TABLET DAILY 90 tablet 3    pravastatin (PRAVACHOL) 20 MG tablet TAKE 1 TABLET DAILY 90 tablet 3    meclizine (ANTIVERT) 25 MG tablet Take 1 tablet by mouth 3 times daily as needed . Use as needed for vertigo      fexofenadine (ALLEGRA ALLERGY) 180 MG tablet Take 180 mg by mouth daily      Multiple Vitamins-Minerals (MULTIVITAMIN ADULT PO) Take 1 tablet by mouth daily       No current facility-administered medications for this visit. 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 thyroid issues    Review of Systems  Please see scanned     Objective:    LMP 01/22/2013   Wt Readings from Last 3 Encounters:   12/30/20 179 lb (81.2 kg)   10/26/20 183 lb (83 kg)   04/27/20 187 lb (84.8 kg)     Vitals:    02/17/21 0923   BP: 107/67   Pulse: 68   Resp: 18   SpO2: 97%       Constitutional: Well-developed, appears stated age, cooperative, in no acute distress  H/E/N/M/T:atraumatic, normocephalic, external ears, nose, lips normal without lesions  Eyes: Lids, lashes, conjunctivae and sclerae normal, No proptosis, no redness  Neck: supple, symmetrical, no swelling  Skin: No obvious rashes or lesions present.   Skin and hair texture normal  Psychiatric: Judgement and Insight:  judgement and insight appear normal  Neuro: Normal without focal findings, speech is normal normal, speech is spontaneous  Chest: No labored breathing, no chest deformity, no stridor  Musculoskeletal: No joint deformity, swelling      Lab Review  Lab Results   Component Value Date    TSH 0.007 11/20/2020    TSH 0.09 10/26/2020     No results found for: FREET4      Assessment:     Hyperthyroidism: Mild, thyroid scan high normal uptake, TSI

## 2021-04-09 DIAGNOSIS — E05.90 HYPERTHYROIDISM: ICD-10-CM

## 2021-04-10 LAB
ALBUMIN SERPL-MCNC: 4.2 G/DL (ref 3.4–5)
ALP BLD-CCNC: 85 U/L (ref 40–129)
ALT SERPL-CCNC: 19 U/L (ref 10–40)
AST SERPL-CCNC: 18 U/L (ref 15–37)
BILIRUB SERPL-MCNC: 0.4 MG/DL (ref 0–1)
BILIRUBIN DIRECT: <0.2 MG/DL (ref 0–0.3)
BILIRUBIN, INDIRECT: NORMAL MG/DL (ref 0–1)
T3 FREE: 2.5 PG/ML (ref 2.3–4.2)
T4 FREE: 0.8 NG/DL (ref 0.9–1.8)
TOTAL PROTEIN: 7.5 G/DL (ref 6.4–8.2)
TSH REFLEX: 0.2 UIU/ML (ref 0.27–4.2)

## 2021-04-12 ENCOUNTER — OFFICE VISIT (OUTPATIENT)
Dept: ENDOCRINOLOGY | Age: 51
End: 2021-04-12
Payer: COMMERCIAL

## 2021-04-12 VITALS
HEIGHT: 62 IN | HEART RATE: 66 BPM | DIASTOLIC BLOOD PRESSURE: 62 MMHG | WEIGHT: 185 LBS | OXYGEN SATURATION: 97 % | SYSTOLIC BLOOD PRESSURE: 110 MMHG | BODY MASS INDEX: 34.04 KG/M2

## 2021-04-12 DIAGNOSIS — E05.90 HYPERTHYROIDISM: Primary | ICD-10-CM

## 2021-04-12 PROCEDURE — 99213 OFFICE O/P EST LOW 20 MIN: CPT | Performed by: INTERNAL MEDICINE

## 2021-04-12 NOTE — PROGRESS NOTES
Subjective:      52 y/o WF who is here for thyroid evaluation.      Referred by Dr. Bettie Gonzales    Interim: Saad Day better    She was diagnosed with hyperthyroidism recently     Had work up done    No neck pain, fever chills     TSH 0.007 FT3 4.97  FT4 1.28  TSI 1.57  10/20 TSH 0.09  10/19 TSH 1.35     Thyroid scan:     FINDINGS:    THYROID APPEARANCE:  Normal size and configuration with normal homogenous uptake   4 HOUR THYROID IODINE UPTAKE:  12.9% ( normal is 6-18%)   24 HOUR THYROID IODINE UPTAKE:  33.2% (normal is 10-35%)     Normal USG    Initial complaints: headache, palpitations, reports fast heart rate , memory fog, tremors, weight loss 14 lb, heat intolerance    Symptoms for a few weeks  Palpitations for months  No eye symptoms  History of obstructive symptoms:  difficulty swallowing no    History of radiation to patient's neck: no  Recent iodine exposure: no  Family history includes Mom Hashimoto's hypothyroidism/ maternal aunt  Family history of thyroid cancer: no    TSH <0.01 FT4 1.8 FT3 7.8    TSH <0.01 T3 1.72 FT3 4.2 Tapazole 10mg      TSH 0.2 FT4 0.8  Tapazole 10mg    She has HTN, takes losartan and HCTZ    see  Past Medical History:   Diagnosis Date    Allergic rhinitis     Caesarean Section     7/10/94    Depression     well controlled    Dysplastic nevus 7/21/10    Hyperlipidemia     Hypertension     Impaired fasting glucose      Past Surgical History:   Procedure Laterality Date     SECTION      COLONOSCOPY  06/15/2018    Dr. Kraig Morales  2013    d&c hysteroscopy and polypectomy    HYSTERECTOMY, VAGINAL  2013    partial    LASIK  3/2015    Dr. Brittney Thomason EXTRACTION       Current Outpatient Medications   Medication Sig Dispense Refill    methIMAzole (TAPAZOLE) 10 MG tablet One tab PO daily 30 tablet 3    propranolol (INDERAL LA) 60 MG extended release capsule Take 1 capsule by mouth daily 30 capsule 3    potassium chloride (KLOR-CON M) 20 MEQ extended release tablet TAKE ONE TABLET BY MOUTH TWICE A  tablet 3    venlafaxine (EFFEXOR XR) 150 MG extended release capsule Take 1 capsule by mouth daily 90 capsule 3    losartan (COZAAR) 100 MG tablet Take 1 tablet by mouth daily 90 tablet 3    hydroCHLOROthiazide (HYDRODIURIL) 25 MG tablet TAKE 1 TABLET DAILY 90 tablet 3    pravastatin (PRAVACHOL) 20 MG tablet TAKE 1 TABLET DAILY 90 tablet 3    meclizine (ANTIVERT) 25 MG tablet Take 1 tablet by mouth 3 times daily as needed . Use as needed for vertigo      fexofenadine (ALLEGRA ALLERGY) 180 MG tablet Take 180 mg by mouth daily      Multiple Vitamins-Minerals (MULTIVITAMIN ADULT PO) Take 1 tablet by mouth daily       No current facility-administered medications for this visit. 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 thyroid issues    Review of Systems  Please see scanned     Objective:    /62   Pulse 66   Ht 5' 2\" (1.575 m)   Wt 185 lb (83.9 kg)   LMP 01/22/2013   SpO2 97%   BMI 33.84 kg/m²   Wt Readings from Last 3 Encounters:   04/12/21 185 lb (83.9 kg)   02/17/21 179 lb 12.8 oz (81.6 kg)   12/30/20 179 lb (81.2 kg)     Vitals:    04/12/21 1132   BP: 110/62   Pulse: 66   SpO2: 97%       Constitutional: Well-developed, appears stated age, cooperative, in no acute distress  H/E/N/M/T:atraumatic, normocephalic, external ears, nose, lips normal without lesions  Eyes: Lids, lashes, conjunctivae and sclerae normal, No proptosis, no redness  Neck: supple, symmetrical, no swelling  Skin: No obvious rashes or lesions present.   Skin and hair texture normal  Psychiatric: Judgement and Insight:  judgement and insight appear normal  Neuro: Normal without focal findings, speech is normal normal, speech is spontaneous  Chest: No labored breathing, no chest deformity, no stridor  Musculoskeletal: No joint deformity, swelling      Lab Review  Lab Results   Component Value Date    TSH 0.007 11/20/2020    TSH 0.09 10/26/2020     No results found for: FREET4      Assessment:     Hyperthyroidism: Mild, thyroid scan high normal uptake, TSI negative. USG is unremarkable. Likely seronegative Graves' disease. Started on  tapazole. Discussed alternative Tx with CRUZ  Feels better  Given Symptoms started on Inderal  Discussed side effects including rash and elevated LFT with tapazole  Advised to see opthalmology for the double vision    HTN:    Palpitations: Advise to see cardiology if levels thyroid are normal and persistent symptoms    Plan: 1. Tapazole 10mg  2. Check TFT, adjust dose based on levels  3. F/u in 6 weeks  4. May stop Inderal, continue for now as occasional palpitations.

## 2021-04-15 RX ORDER — METHIMAZOLE 10 MG/1
TABLET ORAL
Qty: 30 TABLET | Refills: 3 | Status: SHIPPED | OUTPATIENT
Start: 2021-04-15 | End: 2021-05-26 | Stop reason: SDUPTHER

## 2021-04-15 RX ORDER — PROPRANOLOL HCL 60 MG
60 CAPSULE, EXTENDED RELEASE 24HR ORAL DAILY
Qty: 30 CAPSULE | Refills: 3 | Status: SHIPPED | OUTPATIENT
Start: 2021-04-15 | End: 2021-05-26

## 2021-04-15 NOTE — TELEPHONE ENCOUNTER
Medication:   Requested Prescriptions     Pending Prescriptions Disp Refills    propranolol (INDERAL LA) 60 MG extended release capsule 30 capsule 3     Sig: Take 1 capsule by mouth daily    methIMAzole (TAPAZOLE) 10 MG tablet 30 tablet 3     Sig: One tab PO daily       Last Filled:      Patient Phone Number: [phone] (home) [phone] (work)    Last appt: 4/12/2021   Next appt: 5/26/2021    Last Thyroid:   Lab Results   Component Value Date    TSH 0.007 11/20/2020    TSH 0.09 10/26/2020    FT3 2.5 04/09/2021    P8ROTBX 1.72 02/17/2021    T4FREE 0.8 04/09/2021    Y2MERRM 9.10 11/20/2020

## 2021-04-22 DIAGNOSIS — H53.2 DOUBLE VISION: ICD-10-CM

## 2021-04-22 DIAGNOSIS — E05.00 GRAVES DISEASE: Primary | ICD-10-CM

## 2021-05-14 RX ORDER — POTASSIUM CHLORIDE 20 MEQ/1
TABLET, EXTENDED RELEASE ORAL
Qty: 180 TABLET | Refills: 3 | Status: SHIPPED | OUTPATIENT
Start: 2021-05-14 | End: 2022-05-06

## 2021-05-24 DIAGNOSIS — E05.90 HYPERTHYROIDISM: ICD-10-CM

## 2021-05-25 LAB
T3 FREE: 2.5 PG/ML (ref 2.3–4.2)
T4 FREE: 0.6 NG/DL (ref 0.9–1.8)
TSH REFLEX: 3.21 UIU/ML (ref 0.27–4.2)

## 2021-05-26 ENCOUNTER — OFFICE VISIT (OUTPATIENT)
Dept: ENDOCRINOLOGY | Age: 51
End: 2021-05-26
Payer: COMMERCIAL

## 2021-05-26 VITALS
DIASTOLIC BLOOD PRESSURE: 70 MMHG | SYSTOLIC BLOOD PRESSURE: 102 MMHG | HEART RATE: 66 BPM | BODY MASS INDEX: 34.34 KG/M2 | RESPIRATION RATE: 18 BRPM | OXYGEN SATURATION: 97 % | HEIGHT: 62 IN | WEIGHT: 186.6 LBS

## 2021-05-26 DIAGNOSIS — E05.90 HYPERTHYROIDISM: Primary | ICD-10-CM

## 2021-05-26 PROCEDURE — 99214 OFFICE O/P EST MOD 30 MIN: CPT | Performed by: INTERNAL MEDICINE

## 2021-05-26 RX ORDER — METHIMAZOLE 10 MG/1
TABLET ORAL
Qty: 15 TABLET | Refills: 3 | Status: SHIPPED | OUTPATIENT
Start: 2021-05-26 | End: 2021-06-01

## 2021-05-26 NOTE — PROGRESS NOTES
Subjective:      54 y/o WF who is here for thyroid evaluation.      Referred by Dr. Sherri Crockett    Interim: Dotty Patten better  Weight stable    Reports some blurred vision  Will be seeing Dr. Ulises Isaac    She was diagnosed with hyperthyroidism recently     Had work up done    No neck pain, fever chills     TSH 0.007 FT3 4.97  FT4 1.28  TSI 1.57  10/20 TSH 0.09  10/19 TSH 1.35     Thyroid scan:     FINDINGS:    THYROID APPEARANCE:  Normal size and configuration with normal homogenous uptake   4 HOUR THYROID IODINE UPTAKE:  12.9% ( normal is 6-18%)   24 HOUR THYROID IODINE UPTAKE:  33.2% (normal is 10-35%)     Normal USG    Initial complaints: headache, palpitations, reports fast heart rate , memory fog, tremors, weight loss 14 lb, heat intolerance    Symptoms for a few weeks  Palpitations for months  No eye symptoms  History of obstructive symptoms:  difficulty swallowing no    History of radiation to patient's neck: no  Recent iodine exposure: no  Family history includes Mom Hashimoto's hypothyroidism/ maternal aunt  Family history of thyroid cancer: no    TSH <0.01 FT4 1.8 FT3 7.8    TSH <0.01 T3 1.72 FT3 4.2 Tapazole 10mg      TSH 0.2 FT4 0.8  Tapazole 10mg     TSH 3.21 FT4 0.6 FT3 2.5     She has HTN, takes losartan and HCTZ    Past Medical History:   Diagnosis Date    Allergic rhinitis     Caesarean Section     7/10/94    Depression     well controlled    Dysplastic nevus 7/21/10    Hyperlipidemia     Hypertension     Impaired fasting glucose      Past Surgical History:   Procedure Laterality Date     SECTION      COLONOSCOPY  06/15/2018    Dr. Mar Davis  2013    d&c hysteroscopy and polypectomy    HYSTERECTOMY, VAGINAL  2013    partial    LASIK  3/2015    Dr. Peck Feeling EXTRACTION       Current Outpatient Medications   Medication Sig Dispense Refill    potassium chloride (KLOR-CON M20) 20 MEQ extended release tablet TAKE 1 TABLET TWICE A  tablet 3    propranolol (INDERAL LA) 60 MG extended release capsule Take 1 capsule by mouth daily 30 capsule 3    methIMAzole (TAPAZOLE) 10 MG tablet One tab PO daily 30 tablet 3    venlafaxine (EFFEXOR XR) 150 MG extended release capsule Take 1 capsule by mouth daily 90 capsule 3    losartan (COZAAR) 100 MG tablet Take 1 tablet by mouth daily 90 tablet 3    hydroCHLOROthiazide (HYDRODIURIL) 25 MG tablet TAKE 1 TABLET DAILY 90 tablet 3    pravastatin (PRAVACHOL) 20 MG tablet TAKE 1 TABLET DAILY 90 tablet 3    meclizine (ANTIVERT) 25 MG tablet Take 1 tablet by mouth 3 times daily as needed . Use as needed for vertigo      fexofenadine (ALLEGRA ALLERGY) 180 MG tablet Take 180 mg by mouth daily      Multiple Vitamins-Minerals (MULTIVITAMIN ADULT PO) Take 1 tablet by mouth daily       No current facility-administered medications for this visit. 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 thyroid issues    Review of Systems  Please see scanned     Objective:    LMP 01/22/2013   Wt Readings from Last 3 Encounters:   04/12/21 185 lb (83.9 kg)   02/17/21 179 lb 12.8 oz (81.6 kg)   12/30/20 179 lb (81.2 kg)     Vitals:    05/26/21 1012   BP: 102/70   Pulse: 66   Resp: 18   SpO2: 97%       Constitutional: Well-developed, appears stated age, cooperative, in no acute distress  H/E/N/M/T:atraumatic, normocephalic, external ears, nose, lips normal without lesions  Eyes: Lids, lashes, conjunctivae and sclerae normal, No proptosis, no redness  Neck: supple, symmetrical, no swelling  Skin: No obvious rashes or lesions present.   Skin and hair texture normal  Psychiatric: Judgement and Insight:  judgement and insight appear normal  Neuro: Normal without focal findings, speech is normal normal, speech is spontaneous  Chest: No labored breathing, no chest deformity, no stridor  Musculoskeletal: No joint deformity, swelling      Lab Review  Lab Results   Component Value Date    TSH 0.007 11/20/2020    TSH 0.09 10/26/2020     No results found for: FREET4      Assessment:     Hyperthyroidism: Mild, thyroid scan high normal uptake, TSI negative. USG is unremarkable. Likely seronegative Graves' disease. Started on  tapazole. Discussed alternative Tx with CRUZ  Feels better  Given Symptoms started on Inderal  Discussed side effects including rash and elevated LFT with tapazole  TFT improved, level is low, adjust dose  Advised to see opthalmology for the double vision    HTN:    Palpitations: Advise to see cardiology if levels thyroid are normal and persistent symptoms    Plan: 1. Tapazole 10mg---> 5mg  2. Check TFT in 8 weeks  3. F/u in 6 weeks  4. Stop inderal

## 2021-06-01 RX ORDER — PROPRANOLOL HCL 60 MG
60 CAPSULE, EXTENDED RELEASE 24HR ORAL DAILY
Qty: 30 CAPSULE | Refills: 3 | Status: SHIPPED | OUTPATIENT
Start: 2021-06-01 | End: 2021-10-08

## 2021-06-01 RX ORDER — METHIMAZOLE 5 MG/1
TABLET ORAL
Qty: 45 TABLET | Refills: 2 | Status: SHIPPED | OUTPATIENT
Start: 2021-06-01 | End: 2021-08-18 | Stop reason: SDUPTHER

## 2021-06-23 ENCOUNTER — OFFICE VISIT (OUTPATIENT)
Dept: INTERNAL MEDICINE CLINIC | Age: 51
End: 2021-06-23
Payer: COMMERCIAL

## 2021-06-23 VITALS
BODY MASS INDEX: 34.39 KG/M2 | HEART RATE: 62 BPM | WEIGHT: 188 LBS | DIASTOLIC BLOOD PRESSURE: 72 MMHG | SYSTOLIC BLOOD PRESSURE: 120 MMHG

## 2021-06-23 DIAGNOSIS — M75.41 IMPINGEMENT SYNDROME OF RIGHT SHOULDER: Primary | ICD-10-CM

## 2021-06-23 DIAGNOSIS — M75.81 TENDINITIS OF RIGHT ROTATOR CUFF: ICD-10-CM

## 2021-06-23 PROCEDURE — 99213 OFFICE O/P EST LOW 20 MIN: CPT | Performed by: INTERNAL MEDICINE

## 2021-06-23 RX ORDER — LOSARTAN POTASSIUM 100 MG/1
TABLET ORAL
Qty: 90 TABLET | Refills: 3 | Status: SHIPPED | OUTPATIENT
Start: 2021-06-23 | End: 2022-07-11

## 2021-06-23 RX ORDER — HYDROCHLOROTHIAZIDE 25 MG/1
TABLET ORAL
Qty: 90 TABLET | Refills: 3 | Status: SHIPPED | OUTPATIENT
Start: 2021-06-23 | End: 2022-06-06 | Stop reason: SDUPTHER

## 2021-06-23 RX ORDER — NAPROXEN 500 MG/1
500 TABLET ORAL 2 TIMES DAILY WITH MEALS
Qty: 30 TABLET | Refills: 1 | Status: SHIPPED | OUTPATIENT
Start: 2021-06-23 | End: 2022-04-12 | Stop reason: ALTCHOICE

## 2021-06-23 RX ORDER — PRAVASTATIN SODIUM 20 MG
TABLET ORAL
Qty: 90 TABLET | Refills: 3 | Status: SHIPPED | OUTPATIENT
Start: 2021-06-23 | End: 2022-07-11

## 2021-06-23 NOTE — PROGRESS NOTES
[facility]) Physicians  Internal Medicine  Patient Encounter  Alexa Moran D.O., [facility]        Chief Complaint   Patient presents with    Shoulder Pain     rt shoulder       HPI: 53 y.o. female seen urgently today with complaint of \"bad\" shoulder pain. Symptoms started about 2 months ago. She recalls waking and thinking she may have slept on the shoulder awkwardly. Patient states that she has had fairly constant pain. Her pain can be throbbing and at times sharp. Pain is exacerbated with lifting, laying on the affected side, carrying a laundry basket, reaching, or raising her arm above her head. Doing her hair or putting on a shirt or jacket can increase pain. She denies any numbness or tingling. The pain can radiate into the upper arm. She denies any neck pain. The pain is improved with rest, heat, ibuprofen. Past Medical History:   Diagnosis Date    Allergic rhinitis     Caesarean Section     7/10/94    Depression     well controlled    Dysplastic nevus 7/21/10    Hyperlipidemia     Hypertension     Impaired fasting glucose          MEDICATIONS:  Medication Sig   pravastatin (PRAVACHOL) 20 MG tablet TAKE 1 TABLET DAILY   hydroCHLOROthiazide (HYDRODIURIL) 25 MG tablet TAKE 1 TABLET DAILY   losartan (COZAAR) 100 MG tablet TAKE 1 TABLET DAILY   propranolol (INDERAL LA) 60 MG extended release capsule Take 1 capsule by mouth daily   methIMAzole (TAPAZOLE) 5 MG tablet One and half tablet daily   potassium chloride (KLOR-CON M20) 20 MEQ extended release tablet TAKE 1 TABLET TWICE A DAY   venlafaxine (EFFEXOR XR) 150 MG extended release capsule Take 1 capsule by mouth daily   fexofenadine (ALLEGRA ALLERGY) 180 MG tablet Take 180 mg by mouth daily   meclizine (ANTIVERT) 25 MG tablet Take 1 tablet by mouth 3 times daily as needed .  Use as needed for vertigo           Review of Systems - As per HPI      OBJECTIVE:  /72   Pulse 62   Wt 188 lb (85.3 kg)   LMP 01/22/2013   BMI 34.39 kg/m²

## 2021-06-23 NOTE — PATIENT INSTRUCTIONS
To do list:    #1 call or email in 2 weeks with progress report      Patient Education        Rotator Cuff Problems: Care Instructions  Overview     The rotator cuff is a group of tendons and muscles around the shoulder that keeps the shoulder joint stable. It is what allows you to raise and rotate your arm. Over time, daily wear and exercise can cause the tendons to rub on the bones of your shoulder. This is called impingement. This condition may cause the tendons to bruise, degenerate, or tear. In many people, these problems do not cause pain. When they do cause pain, you can do things to reduce the pain and swelling. These include rest, physical therapy, ice and heat, and anti-inflammatory medicine. If you still have pain after trying these treatments, you and your doctor can discuss having a steroid injection or surgery. Follow-up care is a key part of your treatment and safety. Be sure to make and go to all appointments, and call your doctor if you are having problems. It's also a good idea to know your test results and keep a list of the medicines you take. How can you care for yourself at home? · Be safe with medicines. Read and follow all instructions on the label. ? If the doctor gave you a prescription medicine for pain, take it as prescribed. ? If you are not taking a prescription pain medicine, ask your doctor if you can take an over-the-counter medicine. · Put ice or a cold pack on your shoulder for 10 to 20 minutes at a time. Try to do this every 1 to 2 hours for the next 3 days (when you are awake). Put a thin cloth between the ice pack and your skin. · After 2 or 3 days, if you don't have swelling, apply heat. Put a warm water bottle, a heating pad set on low, or a warm cloth on your shoulder. Do not go to sleep with a heating pad on your skin. Put a thin cloth between the heating pad and your skin.  While holding a warm cloth on your shoulder, lean forward so your arm hangs freely, and gently swing your arm back and forth like a pendulum. You also can do this standing under a warm shower. · Follow your doctor's advice for physical therapy. When your doctor says it is okay, try these stretching exercises. Do them slowly to avoid injury. Put a warm, wet towel on your shoulder before exercising. Stop any exercise that increases pain. ? Range-of-motion exercises. If it is not too painful, stretch your arm in four directions: across the body, up the back, to the side, and overhead. ? Pendulum exercise. Lean forward and hold onto a table or the back of a chair with your good arm. Bend at the waist, letting the arm with the sore shoulder hang straight down. Swing your arm back and forth like a pendulum, then in circles that start small and slowly grow larger. This exercise does not use the arm muscles. Instead, use your legs and your hips to create movement that makes your arm swing freely. Try this for about 5 minutes, several times a day. ? Wall climbing (to the side). Stand with your side to a wall so that your fingers can just touch it. Then turn so your body is turned slightly toward the wall. Walk the fingers of your injured arm up the wall as high as pain permits. Try not to shrug your shoulder up toward your ear as you move your arm up. Hold that position for a count of 15 to 30 seconds. Walk your fingers down to the starting position. Repeat 2 to 4 times, trying to reach higher each time. ? Wall climbing (to the front). Face a wall, standing so your fingers can just touch it. Walk the fingers of your affected arm up the wall as high as pain permits. Try not to shrug your shoulder up toward your ear as you move your arm up. Hold that position for a count of 15 to 30 seconds. Slowly walk your fingers to the starting position. Repeat 2 to 4 times, trying to reach higher each time. · Rest your shoulder when you are not doing stretches and other exercises.  Your doctor may tell you to wait for the pain to go away before doing exercises. Do not lift heavy bags of groceries, play sports, or do anything else that makes you twist or stress your shoulder. Avoid activities where you move your affected arm above your head. When should you call for help? Call your doctor now or seek immediate medical care if:    · You have severe pain.     · You cannot move your shoulder or arm.     · You have tingling or numbness in your arm or hand.     · Your arm or hand is cool or pale. Watch closely for changes in your health, and be sure to contact your doctor if:    · Your pain gets worse.     · You have new or worse swelling in your arm or hand.     · You do not get better as expected. Where can you learn more? Go to https://[employer]. org and sign in to your [employer] account. Enter E207 in the [employer] box to learn more about \"Rotator Cuff Problems: Care Instructions. \"     If you do not have an account, please click on the \"Sign Up Now\" link. Current as of: November 16, 2020               Content Version: 12.9  © 6509-7614 [employer]. Care instructions adapted under license by [facility] ([facility]). If you have questions about a medical condition or this instruction, always ask your healthcare professional. [address] any warranty or liability for your use of this information. Patient Education        Rotator Cuff: Exercises  Introduction  Here are some examples of exercises for you to try. The exercises may be suggested for a condition or for rehabilitation. Start each exercise slowly. Ease off the exercises if you start to have pain. You will be told when to start these exercises and which ones will work best for you. How to do the exercises  Pendulum swing   If you have pain in your back, do not do this exercise. 1. Hold on to a table or the back of a chair with your good arm. Then bend forward a little and let your sore arm hang straight down. This exercise does not use the arm muscles. Rather, use your legs and your hips to create movement that makes your arm swing freely. 2. Use the movement from your hips and legs to guide the slightly swinging arm back and forth like a pendulum (or elephant trunk). Then guide it in circles that start small (about the size of a dinner plate). Make the circles a bit larger each day, as your pain allows. 3. Do this exercise for 5 minutes, 5 to 7 times each day. 4. As you have less pain, try bending over a little farther to do this exercise. This will increase the amount of movement at your shoulder. Posterior stretching exercise   1. Hold the elbow of your injured arm with your other hand. 2. Use your hand to pull your injured arm gently up and across your body. You will feel a gentle stretch across the back of your injured shoulder. 3. Hold for at least 15 to 30 seconds. Then slowly lower your arm. 4. Repeat 2 to 4 times. Up-the-back stretch   Your doctor or physical therapist may want you to wait to do this stretch until you have regained most of your range of motion and strength. You can do this stretch in different ways. Hold any of these stretches for at least 15 to 30 seconds. Repeat them 2 to 4 times. 1. Light stretch: Put your hand in your back pocket. Let it rest there to stretch your shoulder. 2. Moderate stretch: With your other hand, hold your injured arm (palm outward) behind your back by the wrist. Pull your arm up gently to stretch your shoulder. 3. Advanced stretch: Put a towel over your other shoulder. Put the hand of your injured arm behind your back. Now hold the back end of the towel. With the other hand, hold the front end of the towel in front of your body. Pull gently on the front end of the towel. This will bring your hand farther up your back to stretch your shoulder. Overhead stretch   1. Standing about an arm's length away, grasp onto a solid surface.  You could use a countertop, a doorknob, or the back of a sturdy chair. 2. With your knees slightly bent, bend forward with your arms straight. Lower your upper body, and let your shoulders stretch. 3. As your shoulders are able to stretch farther, you may need to take a step or two backward. 4. Hold for at least 15 to 30 seconds. Then stand up and relax. If you had stepped back during your stretch, step forward so you can keep your hands on the solid surface. 5. Repeat 2 to 4 times. Shoulder flexion (lying down)   To make a wand for this exercise, use a piece of PVC pipe or a broom handle with the broom removed. Make the wand about a foot wider than your shoulders. 1. Lie on your back, holding a wand with both hands. Your palms should face down as you hold the wand. 2. Keeping your elbows straight, slowly raise your arms over your head. Raise them until you feel a stretch in your shoulders, upper back, and chest.  3. Hold for 15 to 30 seconds. 4. Repeat 2 to 4 times. Shoulder rotation (lying down)   To make a wand for this exercise, use a piece of PVC pipe or a broom handle with the broom removed. Make the wand about a foot wider than your shoulders. 1. Lie on your back. Hold a wand with both hands with your elbows bent and palms up. 2. Keep your elbows close to your body, and move the wand across your body toward the sore arm. 3. Hold for 8 to 12 seconds. 4. Repeat 2 to 4 times. Wall climbing (to the side)   Avoid any movement that is straight to your side, and be careful not to arch your back. Your arm should stay about 30 degrees to the front of your side. 1. Stand with your side to a wall so that your fingers can just touch it at an angle about 30 degrees toward the front of your body. 2. Walk the fingers of your injured arm up the wall as high as pain permits. Try not to shrug your shoulder up toward your ear as you move your arm up.   3. Hold that position for a count of at least 15 to 20.  4. Walk your If you have good range of motion in your shoulders, try this exercise with your arms lifted out to the sides. Keep your elbows at a 90-degree angle. Raise the elastic band up to about shoulder level. Pull the band back to move your shoulder blades toward each other. Then move your arms back where you started. Internal rotator strengthening exercise   1. Start by tying a piece of elastic exercise material to a doorknob. You can use surgical tubing or Thera-Band. 2. Stand or sit with your shoulder relaxed and your elbow bent 90 degrees. Your upper arm should rest comfortably against your side. Squeeze a rolled towel between your elbow and your body for comfort. This will help keep your arm at your side. 3. Hold one end of the elastic band in the hand of the painful arm. 4. Slowly rotate your forearm toward your body until it touches your belly. Slowly move it back to where you started. 5. Keep your elbow and upper arm firmly tucked against the towel roll or at your side. 6. Repeat 8 to 12 times. External rotator strengthening exercise   1. Start by tying a piece of elastic exercise material to a doorknob. You can use surgical tubing or Thera-Band. (You may also hold one end of the band in each hand.)  2. Stand or sit with your shoulder relaxed and your elbow bent 90 degrees. Your upper arm should rest comfortably against your side. Squeeze a rolled towel between your elbow and your body for comfort. This will help keep your arm at your side. 3. Hold one end of the elastic band with the hand of the painful arm. 4. Start with your forearm across your belly. Slowly rotate the forearm out away from your body. Keep your elbow and upper arm tucked against the towel roll or the side of your body until you begin to feel tightness in your shoulder. Slowly move your arm back to where you started. 5. Repeat 8 to 12 times. Follow-up care is a key part of your treatment and safety.  Be sure to make and go to all appointments, and call your doctor if you are having problems. It's also a good idea to know your test results and keep a list of the medicines you take. Where can you learn more? Go to https://[employer]. org and sign in to your [employer] account. Enter Nadeem Acosta in the [facility] box to learn more about \"Rotator Cuff: Exercises. \"     If you do not have an account, please click on the \"Sign Up Now\" link. Current as of: November 16, 2020               Content Version: 12.9  © 1823-4364 [employer]. Care instructions adapted under license by [facility] ([facility]). If you have questions about a medical condition or this instruction, always ask your healthcare professional. [address] any warranty or liability for your use of this information.

## 2021-06-24 ENCOUNTER — HOSPITAL ENCOUNTER (OUTPATIENT)
Age: 51
Discharge: HOME | End: 2021-06-24
Payer: COMMERCIAL

## 2021-06-24 ENCOUNTER — HOSPITAL ENCOUNTER (OUTPATIENT)
Dept: GENERAL RADIOLOGY | Age: 51
Discharge: HOME | End: 2021-06-24
Payer: COMMERCIAL

## 2021-06-24 DIAGNOSIS — M75.41 IMPINGEMENT SYNDROME OF RIGHT SHOULDER: ICD-10-CM

## 2021-06-24 DIAGNOSIS — M75.81 TENDINITIS OF RIGHT ROTATOR CUFF: ICD-10-CM

## 2021-06-24 PROCEDURE — 73030 X-RAY EXAM OF SHOULDER: CPT

## 2021-08-18 ENCOUNTER — OFFICE VISIT (OUTPATIENT)
Dept: ENDOCRINOLOGY | Age: 51
End: 2021-08-18
Payer: COMMERCIAL

## 2021-08-18 VITALS
HEIGHT: 62 IN | HEART RATE: 75 BPM | BODY MASS INDEX: 34.6 KG/M2 | DIASTOLIC BLOOD PRESSURE: 69 MMHG | OXYGEN SATURATION: 97 % | SYSTOLIC BLOOD PRESSURE: 103 MMHG | WEIGHT: 188 LBS

## 2021-08-18 DIAGNOSIS — E05.90 HYPERTHYROIDISM: Primary | ICD-10-CM

## 2021-08-18 PROCEDURE — 99214 OFFICE O/P EST MOD 30 MIN: CPT | Performed by: INTERNAL MEDICINE

## 2021-08-18 RX ORDER — METHIMAZOLE 5 MG/1
TABLET ORAL
Qty: 45 TABLET | Refills: 2 | Status: SHIPPED | OUTPATIENT
Start: 2021-08-18 | End: 2021-12-07

## 2021-08-18 NOTE — PROGRESS NOTES
Subjective:      54 y/o WF who is here for thyroid evaluation.      Referred by Dr. Liz Rice    Interim:   Did not feel well  On 5mg of tapazole  Inderal restarted as had tachycardia    Reports some blurred vision  Will be seeing Dr. Chandra Wild    She was diagnosed with hyperthyroidism recently     Had work up done    No neck pain, fever chills     TSH 0.007 FT3 4.97  FT4 1.28  TSI 1.57  10/20 TSH 0.09  10/19 TSH 1.35     Thyroid scan:     FINDINGS:    THYROID APPEARANCE:  Normal size and configuration with normal homogenous uptake   4 HOUR THYROID IODINE UPTAKE:  12.9% ( normal is 6-18%)   24 HOUR THYROID IODINE UPTAKE:  33.2% (normal is 10-35%)     Normal USG    Initial complaints: headache, palpitations, reports fast heart rate , memory fog, tremors, weight loss 14 lb, heat intolerance    Symptoms for a few weeks  Palpitations for months  No eye symptoms  History of obstructive symptoms:  difficulty swallowing no    History of radiation to patient's neck: no  Recent iodine exposure: no  Family history includes Mom Hashimoto's hypothyroidism/ maternal aunt  Family history of thyroid cancer: no    TSH <0.01 FT4 1.8 FT3 7.8    TSH <0.01 T3 1.72 FT3 4.2 Tapazole 10mg      TSH 0.2 FT4 0.8  Tapazole 10mg     TSH 3.21 FT4 0.6 FT3 2.5    TSH 9.63 FT4 0.7  On 7.5mg    She has HTN, takes losartan and HCTZ    Past Medical History:   Diagnosis Date    Allergic rhinitis     Caesarean Section     7/10/94    Depression     well controlled    Dysplastic nevus 7/21/10    Hyperlipidemia     Hypertension     Impaired fasting glucose      Past Surgical History:   Procedure Laterality Date     SECTION      COLONOSCOPY  06/15/2018    Dr. Nestor Whitaker  2013    d&c hysteroscopy and polypectomy    HYSTERECTOMY, VAGINAL  2013    partial    LASIK  3/2015    Dr. Karen Pennington EXTRACTION       Current Outpatient Medications Medication Sig Dispense Refill    pravastatin (PRAVACHOL) 20 MG tablet TAKE 1 TABLET DAILY 90 tablet 3    hydroCHLOROthiazide (HYDRODIURIL) 25 MG tablet TAKE 1 TABLET DAILY 90 tablet 3    losartan (COZAAR) 100 MG tablet TAKE 1 TABLET DAILY 90 tablet 3    naproxen (NAPROSYN) 500 MG tablet Take 1 tablet by mouth 2 times daily (with meals) 30 tablet 1    propranolol (INDERAL LA) 60 MG extended release capsule Take 1 capsule by mouth daily 30 capsule 3    methIMAzole (TAPAZOLE) 5 MG tablet One and half tablet daily 45 tablet 2    potassium chloride (KLOR-CON M20) 20 MEQ extended release tablet TAKE 1 TABLET TWICE A  tablet 3    venlafaxine (EFFEXOR XR) 150 MG extended release capsule Take 1 capsule by mouth daily 90 capsule 3    meclizine (ANTIVERT) 25 MG tablet Take 1 tablet by mouth 3 times daily as needed . Use as needed for vertigo      fexofenadine (ALLEGRA ALLERGY) 180 MG tablet Take 180 mg by mouth daily       No current facility-administered medications for this visit. 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 thyroid issues    Review of Systems  Please see scanned     Objective:    /69   Pulse 75   Ht 5' 2\" (1.575 m)   Wt 188 lb (85.3 kg)   LMP 01/22/2013   SpO2 97%   Breastfeeding No   BMI 34.39 kg/m²   Wt Readings from Last 3 Encounters:   08/18/21 188 lb (85.3 kg)   06/23/21 188 lb (85.3 kg)   05/26/21 186 lb 9.6 oz (84.6 kg)     Vitals:    08/18/21 1151   BP: 103/69   Pulse: 75   SpO2: 97%       Constitutional: Well-developed, appears stated age, cooperative, in no acute distress  H/E/N/M/T:atraumatic, normocephalic, external ears, nose, lips normal without lesions  Eyes: Lids, lashes, conjunctivae and sclerae normal, No proptosis, no redness  Neck: supple, symmetrical, no swelling  Skin: No obvious rashes or lesions present.   Skin and hair texture normal  Psychiatric: Judgement and Insight:  judgement and insight appear normal  Neuro: Normal without focal findings, speech is normal normal, speech is spontaneous  Chest: No labored breathing, no chest deformity, no stridor  Musculoskeletal: No joint deformity, swelling      Lab Review  Lab Results   Component Value Date    TSH 0.007 11/20/2020    TSH 0.09 10/26/2020     No results found for: FREET4      Assessment:     Hyperthyroidism: Mild, thyroid scan high normal uptake, TSI negative. USG is unremarkable. Likely seronegative Graves' disease. Started on  tapazole. Discussed alternative Tx with CRUZ  Feels better  Given Symptoms started on Inderal  Discussed side effects including rash and elevated LFT with tapazole  Thyroid low, adjust dose  Advised to see opthalmology for the double vision    HTN:    Palpitations: Advise to see cardiology if levels thyroid are normal and persistent symptoms  Restarted Inderal    Plan: 1. Tapazole 7.5mg---> 2.5mg daily or 5mg QOD  2. Check TFT in 8 weeks  3. Inderal 60mg

## 2021-09-30 ENCOUNTER — TELEPHONE (OUTPATIENT)
Dept: INTERNAL MEDICINE CLINIC | Age: 51
End: 2021-09-30

## 2021-09-30 NOTE — TELEPHONE ENCOUNTER
----- Message from Darwin Jenelle sent at 9/30/2021 10:53 AM EDT -----  Subject: Message to Provider    QUESTIONS  Information for Provider? Patient was scheduled today for a virtual   appointment for Fever of 100, Legs Ache, Body Aches but was advised their   insurance doesnt cover virtual appointments. Patient had Negative for   Covid on Tuesday with the PCR Test   ---------------------------------------------------------------------------  --------------  CALL BACK INFO  What is the best way for the office to contact you? OK to leave message on   voicemail, OK to respond with electronic message via [employer] portal (only   for patients who have registered [employer] account)  Preferred Call Back Phone Number? [phone]  ---------------------------------------------------------------------------  --------------  SCRIPT ANSWERS  Relationship to Patient?  Self

## 2021-09-30 NOTE — TELEPHONE ENCOUNTER
Patient was advised that since her insurance company doesn't cover the virtual visit. Patient said she went to urgent care and has negative testing.

## 2021-10-08 ENCOUNTER — TELEPHONE (OUTPATIENT)
Dept: ENDOCRINOLOGY | Age: 51
End: 2021-10-08

## 2021-10-08 RX ORDER — PROPRANOLOL HCL 60 MG
CAPSULE, EXTENDED RELEASE 24HR ORAL
Qty: 30 CAPSULE | Refills: 3 | Status: SHIPPED | OUTPATIENT
Start: 2021-10-08 | End: 2021-12-07

## 2021-10-08 NOTE — TELEPHONE ENCOUNTER
Medication:   Requested Prescriptions     Pending Prescriptions Disp Refills    propranolol (INDERAL LA) 60 MG extended release capsule [Pharmacy Med Name: PROPRANOLOL ER 60 MG CAPSULE] 30 capsule 3     Sig: TAKE ONE CAPSULE BY MOUTH DAILY       Last Filled:      Patient Phone Number: [phone] (home) [phone] (work)    Last appt: 8/18/2021   Next appt: 12/7/2021    Last Labs DM:   Lab Results   Component Value Date    LABA1C 5.7 10/26/2020

## 2021-11-09 DIAGNOSIS — F33.0 MAJOR DEPRESSIVE DISORDER, RECURRENT EPISODE, MILD (HCC): ICD-10-CM

## 2021-11-09 RX ORDER — VENLAFAXINE HYDROCHLORIDE 150 MG/1
150 CAPSULE, EXTENDED RELEASE ORAL DAILY
Qty: 90 CAPSULE | Refills: 3 | Status: SHIPPED | OUTPATIENT
Start: 2021-11-09 | End: 2022-10-29 | Stop reason: SDUPTHER

## 2021-12-06 DIAGNOSIS — E05.90 HYPERTHYROIDISM: ICD-10-CM

## 2021-12-07 ENCOUNTER — OFFICE VISIT (OUTPATIENT)
Dept: ENDOCRINOLOGY | Age: 51
End: 2021-12-07
Payer: COMMERCIAL

## 2021-12-07 VITALS
BODY MASS INDEX: 35.33 KG/M2 | OXYGEN SATURATION: 99 % | DIASTOLIC BLOOD PRESSURE: 78 MMHG | HEART RATE: 65 BPM | HEIGHT: 62 IN | WEIGHT: 192 LBS | SYSTOLIC BLOOD PRESSURE: 111 MMHG

## 2021-12-07 DIAGNOSIS — R00.2 PALPITATION: ICD-10-CM

## 2021-12-07 DIAGNOSIS — E05.90 HYPERTHYROIDISM: Primary | ICD-10-CM

## 2021-12-07 LAB
ALBUMIN SERPL-MCNC: 4.4 G/DL (ref 3.4–5)
ALP BLD-CCNC: 77 U/L (ref 40–129)
ALT SERPL-CCNC: 15 U/L (ref 10–40)
AST SERPL-CCNC: 17 U/L (ref 15–37)
BILIRUB SERPL-MCNC: 0.5 MG/DL (ref 0–1)
BILIRUBIN DIRECT: <0.2 MG/DL (ref 0–0.3)
BILIRUBIN, INDIRECT: NORMAL MG/DL (ref 0–1)
T3 FREE: 3 PG/ML (ref 2.3–4.2)
T4 FREE: 0.8 NG/DL (ref 0.9–1.8)
TOTAL PROTEIN: 7.6 G/DL (ref 6.4–8.2)
TSH REFLEX: 1.98 UIU/ML (ref 0.27–4.2)

## 2021-12-07 PROCEDURE — 99214 OFFICE O/P EST MOD 30 MIN: CPT | Performed by: INTERNAL MEDICINE

## 2021-12-07 NOTE — PROGRESS NOTES
Subjective:      54 y/o WF who is here for thyroid evaluation.      Referred by Dr. Madi Brizuela    Interim:     4 lb gain  Heart burn    Reports some blurred vision  Will be seeing Dr. Winnie Tyson    She was diagnosed with hyperthyroidism    Had work up done    No neck pain, fever chills     TSH 0.007 FT3 4.97  FT4 1.28  TSI 1.57  10/20 TSH 0.09  10/19 TSH 1.35     Thyroid scan:     FINDINGS:    THYROID APPEARANCE:  Normal size and configuration with normal homogenous uptake   4 HOUR THYROID IODINE UPTAKE:  12.9% ( normal is 6-18%)   24 HOUR THYROID IODINE UPTAKE:  33.2% (normal is 10-35%)     Normal USG    Initial complaints: headache, palpitations, reports fast heart rate , memory fog, tremors, weight loss 14 lb, heat intolerance    Symptoms for a few weeks  Palpitations for months  No eye symptoms  History of obstructive symptoms:  difficulty swallowing no    History of radiation to patient's neck: no  Recent iodine exposure: no  Family history includes Mom Hashimoto's hypothyroidism/ maternal aunt  Family history of thyroid cancer: no    TSH <0.01 FT4 1.8 FT3 7.8  Trab -ve    TSH <0.01 T3 1.72 FT3 4.2 Tapazole 10mg      TSH 0.2 FT4 0.8  Tapazole 10mg     TSH 3.21 FT4 0.6 FT3 2.5    TSH 9.63 FT4 0.7  On 7.5mg   TSH 1.98 FT4 0.8   On 2.5mg    She has HTN, takes losartan and HCTZ    Past Medical History:   Diagnosis Date    Allergic rhinitis     Caesarean Section     7/10/94    Depression     well controlled    Dysplastic nevus 7/21/10    Hyperlipidemia     Hypertension     Impaired fasting glucose      Past Surgical History:   Procedure Laterality Date     SECTION      COLONOSCOPY  06/15/2018    Dr. Ana Hay  2013    d&c hysteroscopy and polypectomy    HYSTERECTOMY, VAGINAL  2013    partial    LASIK  3/2015    Dr. Sami WILLS       Current Outpatient Medications   Medication Sig Dispense Refill    venlafaxine (EFFEXOR XR) 150 MG extended release capsule Take 1 capsule by mouth daily 90 capsule 3    propranolol (INDERAL LA) 60 MG extended release capsule TAKE ONE CAPSULE BY MOUTH DAILY 30 capsule 3    methIMAzole (TAPAZOLE) 5 MG tablet half tablet daily 45 tablet 2    pravastatin (PRAVACHOL) 20 MG tablet TAKE 1 TABLET DAILY 90 tablet 3    hydroCHLOROthiazide (HYDRODIURIL) 25 MG tablet TAKE 1 TABLET DAILY 90 tablet 3    losartan (COZAAR) 100 MG tablet TAKE 1 TABLET DAILY 90 tablet 3    naproxen (NAPROSYN) 500 MG tablet Take 1 tablet by mouth 2 times daily (with meals) 30 tablet 1    potassium chloride (KLOR-CON M20) 20 MEQ extended release tablet TAKE 1 TABLET TWICE A  tablet 3    meclizine (ANTIVERT) 25 MG tablet Take 1 tablet by mouth 3 times daily as needed . Use as needed for vertigo      fexofenadine (ALLEGRA ALLERGY) 180 MG tablet Take 180 mg by mouth daily       No current facility-administered medications for this visit. 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 thyroid issues    Review of Systems  Please see scanned     Objective:    Ht 5' 2\" (1.575 m)   Wt 192 lb (87.1 kg)   LMP 01/22/2013   BMI 35.12 kg/m²   Wt Readings from Last 3 Encounters:   12/07/21 192 lb (87.1 kg)   08/18/21 188 lb (85.3 kg)   06/23/21 188 lb (85.3 kg)     Vitals:    12/07/21 1207   BP: 111/78   Pulse: 65   SpO2: 99%       Constitutional: Well-developed, appears stated age, cooperative, in no acute distress  H/E/N/M/T:atraumatic, normocephalic, external ears, nose, lips normal without lesions  Eyes: Lids, lashes, conjunctivae and sclerae normal, No proptosis, no redness  Neck: supple, symmetrical, no swelling  Skin: No obvious rashes or lesions present.   Skin and hair texture normal  Psychiatric: Judgement and Insight:  judgement and insight appear normal  Neuro: Normal without focal findings, speech is normal normal, speech is spontaneous  Chest: No labored breathing, no chest deformity, no stridor  Musculoskeletal: No joint deformity, swelling      Lab Review  Lab Results   Component Value Date    TSH 0.007 11/20/2020    TSH 0.09 10/26/2020     No results found for: FREET4      Assessment:     Hyperthyroidism: Mild, thyroid scan high normal uptake, TSI negative. USG is unremarkable. Likely seronegative Graves' disease. Started on  tapazole. Discussed alternative Tx with CRUZ  Feels better  Given Symptoms started on Inderal  Discussed side effects including rash and elevated LFT with tapazole  FT4 slightly low, hold tapazole as may be in remission  Advised to see opthalmology for the double vision    HTN:    Palpitations: Thyroid level not high, stop inderal.    Plan: 1. Hold tapazole  2. Check TFT in 12 weeks  3. Stop Inderal 60mg

## 2022-01-05 DIAGNOSIS — E05.90 HYPERTHYROIDISM: ICD-10-CM

## 2022-01-05 LAB
ALBUMIN SERPL-MCNC: 4 G/DL (ref 3.4–5)
ALP BLD-CCNC: 68 U/L (ref 40–129)
ALT SERPL-CCNC: 20 U/L (ref 10–40)
AST SERPL-CCNC: 19 U/L (ref 15–37)
BILIRUB SERPL-MCNC: 0.4 MG/DL (ref 0–1)
BILIRUBIN DIRECT: <0.2 MG/DL (ref 0–0.3)
BILIRUBIN, INDIRECT: NORMAL MG/DL (ref 0–1)
T3 FREE: 3.2 PG/ML (ref 2.3–4.2)
T4 FREE: 0.7 NG/DL (ref 0.9–1.8)
TOTAL PROTEIN: 7.2 G/DL (ref 6.4–8.2)
TSH REFLEX: 1.17 UIU/ML (ref 0.27–4.2)

## 2022-01-09 LAB — TSH RECEPTOR AB: <0.9 IU/L

## 2022-04-12 ENCOUNTER — OFFICE VISIT (OUTPATIENT)
Dept: INTERNAL MEDICINE CLINIC | Age: 52
End: 2022-04-12
Payer: COMMERCIAL

## 2022-04-12 VITALS
RESPIRATION RATE: 14 BRPM | SYSTOLIC BLOOD PRESSURE: 124 MMHG | BODY MASS INDEX: 35.96 KG/M2 | DIASTOLIC BLOOD PRESSURE: 78 MMHG | OXYGEN SATURATION: 95 % | HEIGHT: 62 IN | HEART RATE: 80 BPM | WEIGHT: 195.4 LBS

## 2022-04-12 DIAGNOSIS — E05.90 HYPERTHYROIDISM: ICD-10-CM

## 2022-04-12 DIAGNOSIS — M79.642 PAIN IN BOTH HANDS: ICD-10-CM

## 2022-04-12 DIAGNOSIS — Z13.1 SCREENING FOR DIABETES MELLITUS: ICD-10-CM

## 2022-04-12 DIAGNOSIS — F33.0 MAJOR DEPRESSIVE DISORDER, RECURRENT EPISODE, MILD (HCC): ICD-10-CM

## 2022-04-12 DIAGNOSIS — Z23 NEED FOR SHINGLES VACCINE: ICD-10-CM

## 2022-04-12 DIAGNOSIS — M79.641 PAIN IN BOTH HANDS: ICD-10-CM

## 2022-04-12 DIAGNOSIS — E88.81 METABOLIC SYNDROME: ICD-10-CM

## 2022-04-12 DIAGNOSIS — J30.2 SEASONAL ALLERGIC RHINITIS, UNSPECIFIED TRIGGER: ICD-10-CM

## 2022-04-12 DIAGNOSIS — Z11.59 NEED FOR HEPATITIS C SCREENING TEST: ICD-10-CM

## 2022-04-12 DIAGNOSIS — I10 BENIGN ESSENTIAL HTN: Primary | ICD-10-CM

## 2022-04-12 DIAGNOSIS — R73.01 IMPAIRED FASTING BLOOD SUGAR: ICD-10-CM

## 2022-04-12 DIAGNOSIS — E78.2 HYPERLIPIDEMIA, MIXED: ICD-10-CM

## 2022-04-12 DIAGNOSIS — E66.01 CLASS 2 SEVERE OBESITY DUE TO EXCESS CALORIES WITH SERIOUS COMORBIDITY AND BODY MASS INDEX (BMI) OF 35.0 TO 35.9 IN ADULT (HCC): ICD-10-CM

## 2022-04-12 PROCEDURE — 90471 IMMUNIZATION ADMIN: CPT | Performed by: INTERNAL MEDICINE

## 2022-04-12 PROCEDURE — 99214 OFFICE O/P EST MOD 30 MIN: CPT | Performed by: INTERNAL MEDICINE

## 2022-04-12 PROCEDURE — 90750 HZV VACC RECOMBINANT IM: CPT | Performed by: INTERNAL MEDICINE

## 2022-04-12 RX ORDER — CETIRIZINE HYDROCHLORIDE 10 MG/1
10 TABLET ORAL DAILY
COMMUNITY

## 2022-04-12 ASSESSMENT — PATIENT HEALTH QUESTIONNAIRE - PHQ9
8. MOVING OR SPEAKING SO SLOWLY THAT OTHER PEOPLE COULD HAVE NOTICED. OR THE OPPOSITE, BEING SO FIGETY OR RESTLESS THAT YOU HAVE BEEN MOVING AROUND A LOT MORE THAN USUAL: 1
2. FEELING DOWN, DEPRESSED OR HOPELESS: 1
6. FEELING BAD ABOUT YOURSELF - OR THAT YOU ARE A FAILURE OR HAVE LET YOURSELF OR YOUR FAMILY DOWN: 1
9. THOUGHTS THAT YOU WOULD BE BETTER OFF DEAD, OR OF HURTING YOURSELF: 0
SUM OF ALL RESPONSES TO PHQ QUESTIONS 1-9: 10
SUM OF ALL RESPONSES TO PHQ QUESTIONS 1-9: 10
4. FEELING TIRED OR HAVING LITTLE ENERGY: 2
SUM OF ALL RESPONSES TO PHQ9 QUESTIONS 1 & 2: 2
SUM OF ALL RESPONSES TO PHQ QUESTIONS 1-9: 10
1. LITTLE INTEREST OR PLEASURE IN DOING THINGS: 1
10. IF YOU CHECKED OFF ANY PROBLEMS, HOW DIFFICULT HAVE THESE PROBLEMS MADE IT FOR YOU TO DO YOUR WORK, TAKE CARE OF THINGS AT HOME, OR GET ALONG WITH OTHER PEOPLE: 1
3. TROUBLE FALLING OR STAYING ASLEEP: 1
7. TROUBLE CONCENTRATING ON THINGS, SUCH AS READING THE NEWSPAPER OR WATCHING TELEVISION: 3
SUM OF ALL RESPONSES TO PHQ QUESTIONS 1-9: 10
5. POOR APPETITE OR OVEREATING: 0

## 2022-04-12 NOTE — PATIENT INSTRUCTIONS
Patient Education        Hyperthyroidism: Care Instructions  Your Care Instructions  Hyperthyroidism occurs when the thyroid gland makes too much thyroid hormone. This speeds up your metabolismhow your body uses energy. This condition can cause you to be very active, lose weight, and have sleep problems, eye problems, and a fast heart rate. It can also cause a goiter. A goiter is anenlarged thyroid gland that you can see at the front of the neck. Hyperthyroidism is often caused by Graves' disease. In Graves' disease, thebody's defense (immune) system attacks the thyroid gland. Your doctor may prescribe a beta-blocker medicine to slow your pulse and calm you down. But this is not a treatment for hyperthyroidism. It is given for your fast heart rate. Your doctor may also give you antithyroid medicine. This medicine keeps excess thyroid hormone in check. In some cases, doctors recommend radioactive iodine or surgery to remove the thyroid. After either of these treatments, you may need to take medicine to replace thyroid hormone forthe rest of your life. Follow-up care is a key part of your treatment and safety. Be sure to make and go to all appointments, and call your doctor if you are having problems. It's also a good idea to know your test results and keep alist of the medicines you take. How can you care for yourself at home?  Take your medicines exactly as prescribed. You need to take the thyroid medicine at the same time each day. Call your doctor if you think you are having a problem with your medicine. Patricia Moulding' disease can make your eyes sore. Use artificial tears, eye drops, and sunglasses to protect your eyes from dryness, wind, and sun. Raise your head with pillows at night to prevent your eyes from swelling. In some cases, taping your eyelids shut at night will keep your eyes from being dry in the morning.  Make sure you get enough calcium.  Foods that are rich in calcium include milk, yogurt, your [employer] account. Enter Z862 in the [facility] box to learn more about \"Hyperthyroidism: Care Instructions. \"     If you do not have an account, please click on the \"Sign Up Now\" link. Current as of: July 28, 2021               Content Version: 13.2  © 2006-2022 [employer]. Care instructions adapted under license by [facility] ([facility]). If you have questions about a medical condition or this instruction, always ask your healthcare professional. [address] any warranty or liability for your use of this information. Patient Education        Diet and Exercise for Metabolic Syndrome: Care Instructions  Your Care Instructions     Metabolic syndrome is the name for a group of health problems. It includes having too much fat around your waist and high blood pressure. It also includes high triglycerides, high blood sugar, and low levels of healthy (HDL) cholesterol. These problems make it more likely you will have a heart attack orstroke or get diabetes. Your family history (your genes) can cause metabolic syndrome. So can unhealthyeating habits and not getting enough exercise. You can help lower your risk of heart attack, stroke, and diabetes if you eat healthy foods and get more exercise. It may be hard to make these lifestylechanges. But even small changes can help. Follow-up care is a key part of your treatment and safety. Be sure to make and go to all appointments, and call your doctor if you are having problems. It's also a good idea to know your test results and keep alist of the medicines you take. How can you care for yourself at home? Eat more fruits and vegetables   Fruits and vegetables have nutrients to help protect you from heart disease and high blood pressure. They are low in fat and high in fiber. Dark green, orange, and yellow ones are the healthiest.   Keep lots of vegetables ready for snacks.  Buy fruit that is in season.  Then put it where you can see it so you will want to eat it.  Cook dishes that have a lot of vegetables. Soups and stir-fries are good choices. Limit saturated fats   Read food labels, and try to avoid saturated fats. They increase your risk of heart disease.  Use olive or canola oil when you cook.  Bake, broil, grill, or steam foods. Avoid fried foods.  Limit how much high-fat meat you eat. This includes hot dogs and sausages. When you prepare meat, cut off all the fat.  You can replace high-fat meat with fish and skinless poultry. You can also try products made from soybeans, like tofu. Soybeans may be very good for your heart.  Choose low-fat or fat-free milk and dairy products. Eat foods high in fiber   Foods high in fiber may reduce your cholesterol. And they may give you important vitamins and minerals. Good examples are oatmeal, cooked dried beans, brown rice, citrus fruits, and apples.  Eat whole-grain breads and cereals. They have more fiber than white bread or pastries. Limit high-sugar foods   Limit foods and drinks that are high in sugar. Some examples are soda pop, sugar-sweetened fruit drinks, candy, and many desserts.  Limit the amount of sugar, honey, and other sweeteners that you add to food and drinks.  Choose water instead of soda pop or other sugar-sweetened drinks.  Limit fruit juice. Limit salt and sodium   You can help lower your blood pressure if you limit salt and sodium.  If you take the salt shaker off the table, it may be easier to use less salt. You can also try using half the salt in a recipe. And you can avoid adding salt to cooking water for pasta, rice, and potatoes.  Try to eat fewer snacks, fast foods, and other high-salt, processed foods. Check labels so you know how much sodium a food has.  Choose canned goods (soups, vegetables, and beans) that are low in sodium. Get regular exercise   Get more exercise.  Make sure your doctor knows when you start a new exercise program. Even small amounts of exercise will help you get stronger and have more energy. It can also help you manage your weight and your stress.  Walking is a good choice. Little by little, increase the amount you walk every day. Try for at least 30 minutes on most days of the week. Where can you learn more? Go to https://chpepiceweb.[email or web address] org and sign in to your [employer] account. Enter 679 [id number] in the [employer] box to learn more about \"Diet and Exercise for Metabolic Syndrome: Care Instructions. \"     If you do not have an account, please click on the \"Sign Up Now\" link. Current as of: September 8, 2021               Content Version: 13.2  © 2006-2022 [employer]. Care instructions adapted under license by [facility] ([facility]). If you have questions about a medical condition or this instruction, always ask your healthcare professional. [address] any warranty or liability for your use of this information. Patient Education        Learning About Low-Carbohydrate Diets  What is a low-carbohydrate diet? A low-carbohydrate (or \"low-carb\") diet limits foods and drinks that have carbohydrates. This includes grains, fruits, milk and yogurt, and starchy vegetables like potatoes, beans, and corn. It also avoids foods and drinks that have added sugar. Instead, low-carb diets include foods that are high inprotein and fat. Why might you follow a low-carb diet? Low-carb diets may be used for a variety of reasons, such as for weight loss. People who have diabetes may use a low-carb diet to help manage their bloodsugar levels. What should you do before you start the diet? Talk to your doctor before you try any diet. This is even more important if you have health problems like kidney disease, heart disease, or diabetes. Your doctor may suggest that you meet with a registered dietitian.  A dietitian canhelp you make an eating plan that works for you.  What foods do you eat on a low-carb diet? On a low-carb diet, you choose foods that are high in protein and fat. Examplesof these are:  [employer], poultry, and fish.  Eggs.  Nuts, such as walnuts, pecans, almonds, and peanuts.  Peanut butter and other nut butters.  Tofu.  Avocado.  Olives.  Non-starchy vegetables like broccoli, cauliflower, green beans, mushrooms, peppers, lettuce, and spinach.  Unsweetened non-dairy milks like almond milk and coconut milk.  Cheese, cottage cheese, and cream cheese. Where can you learn more? Go to https://Sunbeam.[employer]. org and sign in to your [employer] account. Enter C335 in the [employer] box to learn more about \"Learning About Low-Carbohydrate Diets. \"     If you do not have an account, please click on the \"Sign Up Now\" link. Current as of: September 8, 2021               Content Version: 13.2  © 1815-6999 [employer]. Care instructions adapted under license by [facility] ([facility]). If you have questions about a medical condition or this instruction, always ask your healthcare professional. [address] any warranty or liability for your use of this information. Patient Education        Recombinant Zoster (Shingles) Vaccine: What You Need to Know  Why get vaccinated? Recombinant zoster (shingles) vaccine can prevent shingles. Shingles (also called herpes zoster, or just zoster) is a painful skin rash, usually with blisters. In addition to the rash, shingles can cause fever, headache, chills, or upset stomach. More rarely, shingles can lead to pneumonia, hearingproblems, blindness, brain inflammation (encephalitis), or death. The most common complication of shingles is long-term nerve pain called postherpetic neuralgia (PHN). PHN occurs in the areas where the shingles rash was, even after the rash clears up. It can last for months or years after therash goes away.  The pain from PHN can be severe and debilitating. About 10 to 18% of people who get shingles will experience PHN. The risk of PHN increases with age. An older adult with shingles is more likely to develop PHN and have longer lasting and more severe pain than a younger person withshingles. Shingles is caused by the varicella zoster virus, the same virus that causes chickenpox. After you have chickenpox, the virus stays in your body and can cause shingles later in life. Shingles cannot be passed from one person to another, but the virus that causes shingles can spread and cause chickenpox insomeone who had never had chickenpox or received chickenpox vaccine. Recombinant shingles vaccine  Recombinant shingles vaccine provides strong protection against shingles. Bypreventing shingles, recombinant shingles vaccine also protects against PHN. Recombinant shingles vaccine is the preferred vaccine for the prevention of shingles. However, a different vaccine, live shingles vaccine, may be used in somecircumstances. The recombinant shingles vaccine is recommended for adults 50 years and older without serious immune problems. It is given as a two-dose series. This vaccine is also recommended for people who have already gotten another type of shingles vaccine, the live shingles vaccine. There is no live virus inthis vaccine. Shingles vaccine may be given at the same time as other vaccines. Talk with your health care provider  Tell your vaccine provider if the person getting the vaccine:   Has had an allergic reaction after a previous dose of recombinant shingles vaccine, or has any severe, life-threatening allergies.  Is pregnant or breastfeeding.  Is currently experiencing an episode of shingles. In some cases, your health care provider may decide to postpone shinglesvaccination to a future visit. People with minor illnesses, such as a cold, may be vaccinated.  People who are moderately or severely ill should usually wait until they recover beforegetting recombinant shingles vaccine. Your health care provider can give you more information. Risks of a vaccine reaction   A sore arm with mild or moderate pain is very common after recombinant shingles vaccine, affecting about 80% of vaccinated people. Redness and swelling can also happen at the site of the injection.  Tiredness, muscle pain, headache, shivering, fever, stomach pain, and nausea happen after vaccination in more than half of people who receive recombinant shingles vaccine. In clinical trials, about 1 out of 6 people who got recombinant zoster vaccine experienced side effects that prevented them from doing regular activities. Symptoms usually went away on their own in 2 to 3 days. You should still get the second dose of recombinant zoster vaccine even if youhad one of these reactions after the first dose. People sometimes faint after medical procedures, including vaccination. Tellyour provider if you feel dizzy or have vision changes or ringing in the ears. As with any medicine, there is a very remote chance of a vaccine causing asevere allergic reaction, other serious injury, or death. What if there is a serious problem? An allergic reaction could occur after the vaccinated person leaves the clinic. If you see signs of a severe allergic reaction (hives, swelling of the face and throat, difficulty breathing, a fast heartbeat, dizziness, or weakness), call 9-1-1 and get the person to the nearest hospital.  For other signs that concern you, call your health care provider. Adverse reactions should be reported to the Vaccine Adverse Event Reporting System (VAERS). Your health care provider will usually file this report, or you can do it yourself. Visit the VAERS website at www.vaers. hhs.gov or call 7-353.890.5412. VAERS is only for reporting reactions, and VAERS staff do not give medical advice. How can I learn more?  Ask your health care provider.    Call your local or [facility] health department.  Contact the Centers for Disease Control and Prevention (CDC):  ? Call 4-567.885.8315 (1-800-CDC-INFO) or  ? Visit CDC's website at www.cdc.gov/vaccines  Vaccine Information Statement  Recombinant Zoster Vaccine  10/30/2019  [facility] and [facility] for Disease Control and Prevention  Many Vaccine Information Statements are available in Portuguese and other languages. See www.immunize.org/vis. Hojas de Información Sobre Vacunas están disponibles en Español y en muchos otros idiomas. Visite Jacqueline.si   Care instructions adapted under license by [facility] ([place]). If you have questions about a medical condition or this instruction, always ask your healthcare professional. [address] any warranty or liability for your use of this information.

## 2022-04-12 NOTE — PROGRESS NOTES
[facility]) Physicians  Internal Medicine  Patient Encounter  Luis Alberto Keith D.O., [facility]        Chief Complaint   Patient presents with   Usha Grant    Medication Check       HPI: 49 y.o. female who has not been seen since June 2021 presents today requesting a checkup regarding the status of current chronic medical problems listed below along with a medication review and reconciliation. She states she is enjoying a new granddaughter. She saw Dr. Timo Cerda for her Hyperthyroidism. She was on Tapazole for a short time. This was stopped in December. She is having palpitations only occasionally. She had been on Propranolol. She did see the eye doctor. She did not have repeat lab. 1/5/2022--TSH was 1.17. She does have some joint aches in her hands. She reports associated stiffness. Patient states her hands feel puffy. Lab Results   Component Value Date    TSH 0.007 (L) 11/20/2020          Health maintenance items overdue:  Hepatitis C screening  A1c  Lipid  Electrolytes  Renal function  Shingrix    HTN--She denies any new concerns. She denies new headaches. She denies dizziness, lightheadedness. 131/97 at home. 128/91, 131/81, 136/86, 155/99, 149/98. She may have headaches. Hyperlipidemia--Patient continues on pravastatin. She denies any adverse side effects. She denies any myalgias or weakness. Lab Results   Component Value Date    LDLCALC 124 (H) 10/26/2020       Allergic rhinitis--She has been doing well. She is on Zyrtec 10 mg daily.       Depression--Patient states the Effexor  mg is working much better than the lower dose. She feels her mood is overall good. Still mourns her dad who passed 2 years ago. She states she does worse with colder weather.     PHQ Scores 4/12/2022 4/18/2018   PHQ2 Score 2 0   PHQ9 Score 10 0     Interpretation of Total Score Depression Severity: 1-4 = Minimal depression, 5-9 = Mild depression, 10-14 = Moderate depression, 15-19 = Moderately severe depression, 20-27 = Severe depression     IFG--Patient denies any new symptoms of excessive thirst or frequent urination. She denies any significant weight changes. She has not been very diligent with diet or exercise. Lab Results   Component Value Date    LABA1C 5.7 10/26/2020     Lab Results   Component Value Date    .9 10/26/2020           Past Medical History:   Diagnosis Date    Allergic rhinitis     Caesarean Section     7/10/94    Depression     well controlled    Dysplastic nevus 7/21/10    Hyperlipidemia     Hypertension     Impaired fasting glucose          MEDICATIONS:  Medication Sig   cetirizine (ZYRTEC) 10 MG tablet Take 10 mg by mouth daily   venlafaxine (EFFEXOR XR) 150 MG extended release capsule Take 1 capsule by mouth daily   pravastatin (PRAVACHOL) 20 MG tablet TAKE 1 TABLET DAILY   hydroCHLOROthiazide (HYDRODIURIL) 25 MG tablet TAKE 1 TABLET DAILY   losartan (COZAAR) 100 MG tablet TAKE 1 TABLET DAILY   naproxen (NAPROSYN) 500 MG tablet Take 1 tablet by mouth 2 times daily (with meals)   potassium chloride (KLOR-CON M20) 20 MEQ extended release tablet TAKE 1 TABLET TWICE A DAY   meclizine (ANTIVERT) 25 MG tablet Take 1 tablet by mouth 3 times daily as needed . Use as needed for vertigo            Review of Systems - As per HPI  GEN: Pt denies fever, chills or night sweats. Denies weight changes. Appetite good  HEENT: Pt denies visual and auditory changes, epistaxis, upper respiratory symptoms and dysphagia  CV: Pt denies CP, SOB, CHIANG, orthopnea palpitations, LE swelling, and claudication. PULM: Pt denies cough, wheezing or sputum production  GI: Pt denies N/V/D, heart burn, rectal bleeding, or melena. NEURO: Pt denies unilateral weakness, paresthesia and dizziness.         OBJECTIVE:  Vitals:    04/12/22 0829 04/12/22 0904   BP: 124/78 124/78   Site:  Left Upper Arm   Position:  Sitting   Pulse: 80    Resp: 14    SpO2: 95%    Weight: 195 lb 6.4 oz (88.6 kg) Height: 5' 2\" (1.575 m)      Body mass index is 35.74 kg/m². Wt Readings from Last 3 Encounters:   04/12/22 195 lb 6.4 oz (88.6 kg)   12/07/21 192 lb (87.1 kg)   08/18/21 188 lb (85.3 kg)     BP Readings from Last 3 Encounters:   04/12/22 124/78   12/07/21 111/78   08/18/21 103/69        GEN: NAD, A&O, Non-toxic  HEENT: NC/AT, CELINA, EOMI, Oral cavity Clear,  TM's NL  NECK: Supple. No thyromegaly. No JVD  LYMPH: No C/SC nodes. CV: S1 S2 NL, RRR. No murmurs, clicks or rubs. PULM: CTA, symmentric air exchange  EXT: No C/C/E  GI: Abdomen is soft, NT, BS+, No hepatomegaly. No masses. NEURO: No focal or lateralizing deficits. VASC:  No carotid bruits. Pulses symmetric  SKIN:  No rashes or lesions of concern  MS: Hands do appear \"puffy. \"  ? Synovitis     ASSESSMENT/PLAN:  Tobias Mathis is a 64-year-old female with multiple medical problems seen today for her routine checkup regarding the status of current issues listed below along with a medication review and reconciliation. She is also complaining of hand stiffness and soreness    1. Benign essential HTN  Well-controlled here in the office  Her home blood pressure readings seem high  Continue current medication  Patient to bring in blood pressure monitor for her next visit  - CBC with Auto Differential; Future  - Comprehensive Metabolic Panel; Future  - Lipid Panel; Future    2. Hyperthyroidism  ? Graves' disease  Will recheck her TSH and free T3 and T4. She will likely need to follow-up with endocrinology unless in remission  - TSH; Future  - T3, Free; Future  - T4, Free; Future    3. Need for shingles vaccine    - Zoster recombinant [facility])    4. Hyperlipidemia, mixed  Condition stability and control are uncertain. Due for lab  Continue aggressive lifestyle modification including a low-fat, portion controlled diet along with carbohydrate control as well. Recommended regular exercise  - Comprehensive Metabolic Panel; Future  - Lipid Panel; Future    5. Impaired fasting blood sugar  Condition stability and control are uncertain. Due for lab  Continue efforts with Lifestyle modification including low calorie diet focusing on Low fat/low cholesterol and low carbohydrate intake, along with  increasing cardiovascular (aerobic) exercise. - Comprehensive Metabolic Panel; Future  - Hemoglobin A1C; Future    6. Metabolic syndrome  As above  - Comprehensive Metabolic Panel; Future  - Hemoglobin A1C; Future  - Lipid Panel; Future    7. Screening for diabetes mellitus    - Comprehensive Metabolic Panel; Future  - Hemoglobin A1C; Future    8. Need for hepatitis C screening test    - Hepatitis C Antibody; Future    9. Pain in both hands  Etiology is unclear  Rule out hypothyroidism  Rule out rheumatoid  - RHEUMATOID FACTOR; Future  - Sedimentation Rate; Future  - C-Reactive Protein; Future    10. Seasonal allergic rhinitis, unspecified trigger  Controlled  Continue Zyrtec      11. Major depressive disorder  May have been worse over the winter. She feels she will be better when the warmer weather arrives  Continue Effexor  Continue to monitor for mood problems    12. Class II severe obesity due to excess calories with serious comorbidity and body mass index of 35.0-35.9  --As above    Discussed medications with patient who voiced understanding of their use, indication and potential side effects. Pt also understands the above recommendations. All questions answered. This note was generated completely or in part utilizing Dragon dictation speech recognition software. Occasionally, words are mistranscribed and despite editing, the text may contain inaccuracies due to incorrect word recognition.   If further clarification is needed please contact the office at [phone]       Electronically signed    Estela Nair D.O.

## 2022-04-27 ENCOUNTER — OFFICE VISIT (OUTPATIENT)
Dept: ENDOCRINOLOGY | Age: 52
End: 2022-04-27
Payer: COMMERCIAL

## 2022-04-27 VITALS
HEIGHT: 62 IN | SYSTOLIC BLOOD PRESSURE: 103 MMHG | OXYGEN SATURATION: 97 % | HEART RATE: 98 BPM | BODY MASS INDEX: 35.3 KG/M2 | WEIGHT: 191.8 LBS | DIASTOLIC BLOOD PRESSURE: 67 MMHG

## 2022-04-27 DIAGNOSIS — E05.90 HYPERTHYROIDISM: Primary | ICD-10-CM

## 2022-04-27 PROCEDURE — 99213 OFFICE O/P EST LOW 20 MIN: CPT | Performed by: INTERNAL MEDICINE

## 2022-04-27 NOTE — PROGRESS NOTES
Subjective:      54 y/o WF who is here for thyroid evaluation.      Referred by Dr. Ean Flores    Interim:     occasional palpitations    Reports some blurred vision  Will be seeing Dr. Natalie Chavez    She was diagnosed with hyperthyroidism    Had work up done    No neck pain, fever chills     TSH 0.007 FT3 4.97  FT4 1.28  TSI 1.57  10/20 TSH 0.09  10/19 TSH 1.35     Thyroid scan:     FINDINGS:    THYROID APPEARANCE:  Normal size and configuration with normal homogenous uptake   4 HOUR THYROID IODINE UPTAKE:  12.9% ( normal is 6-18%)   24 HOUR THYROID IODINE UPTAKE:  33.2% (normal is 10-35%)     Normal USG    Initial complaints: headache, palpitations, reports fast heart rate , memory fog, tremors, weight loss 14 lb, heat intolerance    Symptoms for a few weeks  Palpitations for months  No eye symptoms  History of obstructive symptoms:  difficulty swallowing no    History of radiation to patient's neck: no  Recent iodine exposure: no  Family history includes Mom Hashimoto's hypothyroidism/ maternal aunt  Family history of thyroid cancer: no    TSH <0.01 FT4 1.8 FT3 7.8  Trab -ve    TSH <0.01 T3 1.72 FT3 4.2 Tapazole 10mg      TSH 0.2 FT4 0.8  Tapazole 10mg     TSH 3.21 FT4 0.6 FT3 2.5    TSH 9.63 FT4 0.7  On 7.5mg   TSH 1.98 FT4 0.8   On 2.5mg   TSH 0.21 FT4 0.9 FT3  3.8  Off tapazole    She has HTN, takes losartan and HCTZ    Past Medical History:   Diagnosis Date    Allergic rhinitis     Caesarean Section     7/10/94    Depression     well controlled    Dysplastic nevus 7/21/10    Hyperlipidemia     Hypertension     Impaired fasting glucose      Past Surgical History:   Procedure Laterality Date     SECTION      COLONOSCOPY  06/15/2018    Dr. Amy Vaughn  2013    d&c hysteroscopy and polypectomy    HYSTERECTOMY, VAGINAL  2013    partial    LASIK  3/2015    Dr. Patti Guajardo Current Outpatient Medications   Medication Sig Dispense Refill    cetirizine (ZYRTEC) 10 MG tablet Take 10 mg by mouth daily      venlafaxine (EFFEXOR XR) 150 MG extended release capsule Take 1 capsule by mouth daily 90 capsule 3    pravastatin (PRAVACHOL) 20 MG tablet TAKE 1 TABLET DAILY 90 tablet 3    hydroCHLOROthiazide (HYDRODIURIL) 25 MG tablet TAKE 1 TABLET DAILY 90 tablet 3    losartan (COZAAR) 100 MG tablet TAKE 1 TABLET DAILY 90 tablet 3    potassium chloride (KLOR-CON M20) 20 MEQ extended release tablet TAKE 1 TABLET TWICE A  tablet 3    meclizine (ANTIVERT) 25 MG tablet Take 1 tablet by mouth 3 times daily as needed . Use as needed for vertigo       No current facility-administered medications for this visit. 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 thyroid issues    Review of Systems  Please see scanned     Objective:    /67   Pulse 98   Ht 5' 2\" (1.575 m)   Wt 191 lb 12.8 oz (87 kg)   LMP 01/22/2013   SpO2 97%   Breastfeeding No   BMI 35.08 kg/m²   Wt Readings from Last 3 Encounters:   04/27/22 191 lb 12.8 oz (87 kg)   04/12/22 195 lb 6.4 oz (88.6 kg)   12/07/21 192 lb (87.1 kg)     Vitals:    04/27/22 1005   BP: 103/67   Pulse: 98   SpO2: 97%       Constitutional: Well-developed, appears stated age, cooperative, in no acute distress  H/E/N/M/T:atraumatic, normocephalic, external ears, nose, lips normal without lesions  Eyes: Lids, lashes, conjunctivae and sclerae normal, No proptosis, no redness  Neck: supple, symmetrical, no swelling  Skin: No obvious rashes or lesions present.   Skin and hair texture normal  Psychiatric: Judgement and Insight:  judgement and insight appear normal  Neuro: Normal without focal findings, speech is normal normal, speech is spontaneous  Chest: No labored breathing, no chest deformity, no stridor  Musculoskeletal: No joint deformity, swelling      Lab Review  Lab Results   Component Value Date    TSH 0.21 04/12/2022     No results found for: FREET4      Assessment:     Hyperthyroidism: Mild, thyroid scan high normal uptake, TSI negative. USG is unremarkable. Likely seronegative Graves' disease. Started on  tapazole. Discussed alternative Tx with CRUZ  Feels better  Given Symptoms started on Inderal, now off of it  Discussed side effects including rash and elevated LFT with tapazole  TSH slightly low, sub clinical, will monitor  Advised to see opthalmology for the double vision    HTN:    Palpitations: Thyroid level not high, stop inderal.    Plan: 1. Continue to Hold tapazole  2. Check TFT in 12 weeks

## 2022-05-06 RX ORDER — POTASSIUM CHLORIDE 20 MEQ/1
TABLET, EXTENDED RELEASE ORAL
Qty: 180 TABLET | Refills: 3 | Status: SHIPPED | OUTPATIENT
Start: 2022-05-06

## 2022-06-06 ENCOUNTER — PATIENT MESSAGE (OUTPATIENT)
Dept: ENDOCRINOLOGY | Age: 52
End: 2022-06-06

## 2022-06-06 RX ORDER — HYDROCHLOROTHIAZIDE 25 MG/1
TABLET ORAL
Qty: 90 TABLET | Refills: 3 | Status: SHIPPED | OUTPATIENT
Start: 2022-06-06

## 2022-06-06 NOTE — TELEPHONE ENCOUNTER
Last appointment: 4/12/2022  Next appointment: 10/14/2022  Last refill: 6/23/21   please advise as DOD.  Thank you

## 2022-06-07 RX ORDER — METHIMAZOLE 5 MG/1
TABLET ORAL
COMMUNITY
Start: 2022-05-22 | End: 2022-06-07 | Stop reason: SDUPTHER

## 2022-06-07 RX ORDER — METHIMAZOLE 5 MG/1
5 TABLET ORAL DAILY
Qty: 30 TABLET | Refills: 3 | Status: SHIPPED | OUTPATIENT
Start: 2022-06-07 | End: 2022-08-08 | Stop reason: SDUPTHER

## 2022-07-11 RX ORDER — PRAVASTATIN SODIUM 20 MG
TABLET ORAL
Qty: 90 TABLET | Refills: 3 | Status: SHIPPED | OUTPATIENT
Start: 2022-07-11

## 2022-07-11 RX ORDER — LOSARTAN POTASSIUM 100 MG/1
TABLET ORAL
Qty: 90 TABLET | Refills: 3 | Status: SHIPPED | OUTPATIENT
Start: 2022-07-11

## 2022-08-05 DIAGNOSIS — E05.90 HYPERTHYROIDISM: ICD-10-CM

## 2022-08-05 LAB
T3 FREE: 2.9 PG/ML (ref 2.3–4.2)
T4 FREE: 0.8 NG/DL (ref 0.9–1.8)
TSH REFLEX: 0.05 UIU/ML (ref 0.27–4.2)

## 2022-08-08 ENCOUNTER — OFFICE VISIT (OUTPATIENT)
Dept: ENDOCRINOLOGY | Age: 52
End: 2022-08-08
Payer: COMMERCIAL

## 2022-08-08 VITALS
TEMPERATURE: 98 F | WEIGHT: 183 LBS | DIASTOLIC BLOOD PRESSURE: 87 MMHG | HEART RATE: 93 BPM | RESPIRATION RATE: 14 BRPM | BODY MASS INDEX: 33.68 KG/M2 | SYSTOLIC BLOOD PRESSURE: 125 MMHG | HEIGHT: 62 IN

## 2022-08-08 DIAGNOSIS — E05.90 HYPERTHYROIDISM: Primary | ICD-10-CM

## 2022-08-08 PROCEDURE — 99214 OFFICE O/P EST MOD 30 MIN: CPT | Performed by: INTERNAL MEDICINE

## 2022-08-08 RX ORDER — METHIMAZOLE 5 MG/1
5 TABLET ORAL DAILY
Qty: 30 TABLET | Refills: 3 | Status: SHIPPED | OUTPATIENT
Start: 2022-08-08 | End: 2022-10-07 | Stop reason: SDUPTHER

## 2022-08-08 NOTE — PROGRESS NOTES
Subjective:      52 y/o WF who is here for thyroid evaluation.      Referred by Dr. Christiano Cerda    Interim:     occasional palpitations  Improved with tapazole  Lost weight intentionally    Reports some blurred vision  Will be seeing Dr. Karin Tan    She was diagnosed with hyperthyroidism    Had work up done    No neck pain, fever chills     TSH 0.007 FT3 4.97  FT4 1.28  TSI 1.57  10/20 TSH 0.09  10/19 TSH 1.35     Thyroid scan:     FINDINGS:    THYROID APPEARANCE:  Normal size and configuration with normal homogenous uptake   4 HOUR THYROID IODINE UPTAKE:  12.9% ( normal is 6-18%)   24 HOUR THYROID IODINE UPTAKE:  33.2% (normal is 10-35%)     Normal USG    Initial complaints: headache, palpitations, reports fast heart rate , memory fog, tremors, weight loss 14 lb, heat intolerance    Symptoms for a few weeks  Palpitations for months  No eye symptoms  History of obstructive symptoms:  difficulty swallowing no    History of radiation to patient's neck: no  Recent iodine exposure: no  Family history includes Mom Hashimoto's hypothyroidism/ maternal aunt  Family history of thyroid cancer: no    TSH <0.01 FT4 1.8 FT3 7.8  Trab -ve    TSH <0.01 T3 1.72 FT3 4.2 Tapazole 10mg      TSH 0.2 FT4 0.8  Tapazole 10mg     TSH 3.21 FT4 0.6 FT3 2.5    TSH 9.63 FT4 0.7  On 7.5mg   TSH 1.98 FT4 0.8   On 2.5mg   TSH 0.21 FT4 0.9 FT3  3.8  Off tapazole   TSH 0.05 FT4 0.8 FT3 2.9  tapazole 5mg    She has HTN, takes losartan and HCTZ    Past Medical History:   Diagnosis Date    Allergic rhinitis     Caesarean Section     7/10/94    Depression     well controlled    Dysplastic nevus 7/21/10    Hyperlipidemia     Hypertension     Impaired fasting glucose      Past Surgical History:   Procedure Laterality Date     SECTION      COLONOSCOPY  06/15/2018    Dr. Damien Montelongo  2013    d&c hysteroscopy and polypectomy    HYSTERECTOMY, VAGINAL  2013    partial ATIF  3/2015    Dr. Singletary Pi EXTRACTION       Current Outpatient Medications   Medication Sig Dispense Refill    losartan (COZAAR) 100 MG tablet TAKE 1 TABLET DAILY 90 tablet 3    pravastatin (PRAVACHOL) 20 MG tablet TAKE 1 TABLET DAILY 90 tablet 3    methIMAzole (TAPAZOLE) 5 MG tablet Take 1 tablet by mouth daily 30 tablet 3    hydroCHLOROthiazide (HYDRODIURIL) 25 MG tablet Take 1 tablet by mouth daily 90 tablet 3    potassium chloride (KLOR-CON M20) 20 MEQ extended release tablet TAKE 1 TABLET TWICE A  tablet 3    cetirizine (ZYRTEC) 10 MG tablet Take 10 mg by mouth daily      venlafaxine (EFFEXOR XR) 150 MG extended release capsule Take 1 capsule by mouth daily 90 capsule 3    meclizine (ANTIVERT) 25 MG tablet Take 1 tablet by mouth 3 times daily as needed . Use as needed for vertigo       No current facility-administered medications for this visit. 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 thyroid issues    Review of Systems  Please see scanned     Objective:    LMP 01/22/2013   Wt Readings from Last 3 Encounters:   04/27/22 191 lb 12.8 oz (87 kg)   04/12/22 195 lb 6.4 oz (88.6 kg)   12/07/21 192 lb (87.1 kg)   ]  Vitals:    08/08/22 1403   BP: 125/87   Pulse: 93   Resp: 14   Temp: 98 °F (36.7 °C)         Constitutional: Well-developed, appears stated age, cooperative, in no acute distress  H/E/N/M/T:atraumatic, normocephalic, external ears, nose, lips normal without lesions  Eyes: Lids, lashes, conjunctivae and sclerae normal, No proptosis, no redness  Neck: supple, symmetrical, no swelling  Skin: No obvious rashes or lesions present.   Skin and hair texture normal  Psychiatric: Judgement and Insight:  judgement and insight appear normal  Neuro: Normal without focal findings, speech is normal normal, speech is spontaneous  Chest: No labored breathing, no chest deformity, no stridor  Musculoskeletal: No joint deformity, swelling      Lab Review  Lab Results   Component Value Date/Time    TSH 0.21 04/12/2022 09:29 AM     No results found for: FREET4      Assessment:     Hyperthyroidism: Mild, thyroid scan high normal uptake, TSI negative. USG is unremarkable. Likely seronegative Graves' disease. Started on  tapazole. Discussed alternative Tx with CRUZ  Feels better  Given Symptoms started on Inderal, now off of it  Discussed side effects including rash and elevated LFT with tapazole  TSH slightly low, sub clinical, but FT4 is also low, will monitor  Advised to see opthalmology for the double vision    HTN:    Palpitations: Thyroid level not high, stop inderal.    Plan: 1. Tapazole 5mg  2. Check TFT in 12 weeks

## 2022-10-10 RX ORDER — METHIMAZOLE 5 MG/1
5 TABLET ORAL DAILY
Qty: 30 TABLET | Refills: 3 | Status: SHIPPED | OUTPATIENT
Start: 2022-10-10

## 2022-10-10 NOTE — TELEPHONE ENCOUNTER
Medication:   Requested Prescriptions     Pending Prescriptions Disp Refills    methIMAzole (TAPAZOLE) 5 MG tablet 30 tablet 3     Sig: Take 1 tablet by mouth daily       Last Filled:      Patient Phone Number: [phone] (home) [phone] (work)    Last appt: 8/8/2022   Next appt: 11/15/2022    Last Thyroid:   Lab Results   Component Value Date/Time    TSH 0.21 04/12/2022 09:29 AM    FT3 2.9 08/05/2022 09:04 AM    Z1DFXGY 1.72 02/17/2021 09:59 AM    T4FREE 0.8 08/05/2022 09:04 AM    A4VSXAO 9.10 11/20/2020 10:50 AM

## 2022-10-14 ENCOUNTER — OFFICE VISIT (OUTPATIENT)
Dept: INTERNAL MEDICINE CLINIC | Age: 52
End: 2022-10-14
Payer: COMMERCIAL

## 2022-10-14 VITALS
HEART RATE: 71 BPM | BODY MASS INDEX: 34.04 KG/M2 | DIASTOLIC BLOOD PRESSURE: 84 MMHG | WEIGHT: 185 LBS | OXYGEN SATURATION: 98 % | RESPIRATION RATE: 14 BRPM | HEIGHT: 62 IN | SYSTOLIC BLOOD PRESSURE: 112 MMHG

## 2022-10-14 DIAGNOSIS — R06.81 WITNESSED APNEIC SPELLS: ICD-10-CM

## 2022-10-14 DIAGNOSIS — E78.2 HYPERLIPIDEMIA, MIXED: ICD-10-CM

## 2022-10-14 DIAGNOSIS — R73.01 IMPAIRED FASTING BLOOD SUGAR: ICD-10-CM

## 2022-10-14 DIAGNOSIS — Z23 NEED FOR SHINGLES VACCINE: ICD-10-CM

## 2022-10-14 DIAGNOSIS — I10 BENIGN ESSENTIAL HTN: ICD-10-CM

## 2022-10-14 DIAGNOSIS — R06.83 SNORING: ICD-10-CM

## 2022-10-14 DIAGNOSIS — Z00.00 ANNUAL PHYSICAL EXAM: Primary | ICD-10-CM

## 2022-10-14 PROCEDURE — 90674 CCIIV4 VAC NO PRSV 0.5 ML IM: CPT | Performed by: INTERNAL MEDICINE

## 2022-10-14 PROCEDURE — 90472 IMMUNIZATION ADMIN EACH ADD: CPT | Performed by: INTERNAL MEDICINE

## 2022-10-14 PROCEDURE — 90750 HZV VACC RECOMBINANT IM: CPT | Performed by: INTERNAL MEDICINE

## 2022-10-14 PROCEDURE — 90471 IMMUNIZATION ADMIN: CPT | Performed by: INTERNAL MEDICINE

## 2022-10-14 PROCEDURE — 99396 PREV VISIT EST AGE 40-64: CPT | Performed by: INTERNAL MEDICINE

## 2022-10-14 NOTE — PATIENT INSTRUCTIONS
Preventive plan of care for Dinora Dias        10/14/2022           Preventive Measures Status       Recommendations for screening   Colon Cancer Screen   Last colonoscopy: 6/15/2018 Colonoscopy due every 5 years--due June 2023   Breast Cancer Screen  Last mammogram: 8/20/2021 Repeat yearly   Cervical Cancer Screen   Last PAP smear: 2015 Repeat cervical cancer screening is not clinically indicated due to hysterectomy status.   Please visit with her gynecologist for an annual breast and pelvic exam-   Osteoporosis Screen   Last DXA scan: None This test is not clinically indicated   Diabetes Screen  Glucose (mg/dL)   Date Value   04/12/2022 109 (H)    Test recommended and ordered   Cholesterol Screen  Lab Results   Component Value Date    CHOL 201 (H) 04/12/2022    TRIG 148 04/12/2022    HDL 51 04/12/2022    LDLCALC 120 (H) 04/12/2022    Test recommended and ordered   Hepatitis C screening recommended for those between the ages of 18-79--4/12/2022   No need to repeat at this time   HIV screening recommended for those ages 12-76 NO MATTER THE RISK FOR HIV--  10/7/2016 No need to repeat at this time   Aspirin for Cardiovascular Prevention   No Not indicated    Recommended Immunizations    Immunization History   Administered Date(s) Administered    COVID-19, PFIZER PURPLE top, DILUTE for use, (age 15 y+), 30mcg/0.3mL 03/23/2021, 04/14/2021, 12/10/2021    Influenza 11/05/2010, 09/24/2013    Influenza Virus Vaccine 09/05/2014, 10/05/2015    Influenza, FLUARIX, FLULAVAL, Giovanni Jj (age 10 mo+) AND AFLURIA, (age 1 y+), PF, 0.5mL 10/25/2019, 10/26/2020    Influenza, FLUCELVAX, (age 10 mo+), MDCK, PF, 0.5mL 10/14/2022    PPD Test 01/31/2006    Tdap (Boostrix, Adacel) 09/05/2014    Tetanus 01/31/2006    Zoster Recombinant (Shingrix) 04/12/2022, 10/14/2022        Influenza vaccine:  recommended every fall--Given today    Pneumonia vaccine: Pneumovax Due at age 72    Tetanus vaccine:  tetanus and diptheria/Pertussis vaccine (Td/Tdap) recommended every 10 years- Td due 2024     Shingles vaccine:  Shingrx #2 due at this time    COVID-19 updated booster-- Recommended. Additional Recommendations   1. Use Sunscreen daily to help reduce the risk of skin cancer. 2. Continue a healthy lifestyle including a low fat and low carb diet along with regular aerobic exercise   3. Please schedule an eye exam.    4. Always wear a seatbelt while in a car  5. Always wear helmet when riding bicycle      Here are a few  Reliable websites with a variety of health and wellness information:   www.mylifecheck. heart. org     www.nutritionsource. org     www. americanheart. org     www. diabetes. org      www.menopause. org     www.[email or web address]     [email or web address] [facility]

## 2022-10-14 NOTE — PROGRESS NOTES
[facility]) Physicians  Internal Medicine  Patient Encounter  Mary Mornoy D.O., [facility] Preventative Physical    Chief Complaint   Patient presents with    Annual Exam       HPI-- 49 y.o. female presents today requesting her annual preventative physical.      Medical/Surgical Histories     Past Medical History:   Diagnosis Date    Allergic rhinitis     Caesarean Section     7/10/94    Depression     well controlled    Dysplastic nevus 7/21/10    Hyperlipidemia     Hypertension     Impaired fasting glucose           Past Surgical History:   Procedure Laterality Date     SECTION      COLONOSCOPY  06/15/2018    Dr. Hallie Braden  2013    d&c hysteroscopy and polypectomy    HYSTERECTOMY, VAGINAL  2013    partial    LASIK  3/2015    Dr. Mya Keith EXTRACTION             Medications/Allergies     Medication Sig   hydroCHLOROthiazide (HYDRODIURIL) 25 MG tablet TAKE 1 TABLET DAILY   pravastatin (PRAVACHOL) 20 MG tablet TAKE 1 TABLET DAILY   losartan (COZAAR) 100 MG tablet Take 1 tablet by mouth daily   potassium chloride (KLOR-CON M) 20 MEQ extended release tablet TAKE ONE TABLET BY MOUTH TWICE A DAY   fexofenadine (ALLEGRA ALLERGY) 180 MG tablet Take 180 mg by mouth daily   Multiple Vitamins-Minerals (MULTIVITAMIN ADULT PO) Take 1 tablet by mouth daily   venlafaxine (EFFEXOR XR) 150 MG extended release capsule Take 1 capsule by mouth daily   meclizine (ANTIVERT) 25 MG tablet Take 1 tablet by mouth 3 times daily as needed .  Use as needed for vertigo          No Known Allergies      Substance Use History     Social History     Tobacco Use    Smoking status: Never    Smokeless tobacco: Never   Substance Use Topics    Alcohol use: No     Comment: rarely    Drug use: No          Family History     Family History   Problem Relation Age of Onset    High Blood Pressure Mother     Diabetes Mother     Thyroid Disease Mother     Hypertension Father     Colon Cancer Father 79        Stage IV, Mets to bone and nodes. Prostate Cancer Father     Breast Cancer Maternal Aunt     Heart Disease Maternal Grandmother     Heart Failure Maternal Grandmother     Cancer Maternal Grandfather         Kidney, bladder    Heart Disease Paternal Grandmother     Heart Failure Paternal Grandmother               REVIEW OF SYSTEMS:    CONSTITUTIONAL:  Neg   Recent weight changes,  fever, chills or night sweats, anorexia. EYES: Neg  Blurry vision, loss of vision, double vision, tearing, itching, eye pain. EARS:  Neg Hearing loss, tinnitus, vertigo, discharge or otalgia. NOSE:  Neg  Rhinorrhea, sneezing, itching, allergy, epistaxis. +Snoring. MOUTH/THROAT:  Neg Bleeding gums, hoarseness, sore throat, dysphagia, throat infections, or dentures  RESPIRATORY:  Neg SOB ,wheeze, sputum, hemoptysis. No report of + TB test.  + Snoring.  + Witnessed apnea. CARDIOVASCULAR:  Neg Chest pain, palpitations, heart murmur, dyspnea on exertion, orthopnea, paroxysmal nocturnal dyspnea or edema of extremities, or claudication. GASTROINTESTINAL:  Neg   Nausea, vomiting, or diarrhea, constipation, hematemesis, heart burn, dysphagia,change in bowel movements or stool caliber, hematochezia, melena, abdominal pain, or food intolerance. Colonoscopy: Yes, 6/15/2018. 5-year recall  GENITOURINARY:  Neg  Urinary frequency, hesitancy, urgency, polyuria, dysuria, hematuria, nocturia, incontinence, change in stream, genital pain or swelling, kidney stones, STD's. PAP/NAOMIE: Yes  HEMATOLOGIC/LYMPHATIC:  Neg  Anemia, bleeding dyscrasias, easy bruising, blood clots (DVT/PE), transfusions, or enlarged lymph nodes  MUSCULOSKELETAL:  Neg  New myalgias, bone pain, joint pain, joint swelling, low back pain,  Neck pain, radicular pain, or fractures.   NEUROLOGICAL:  Neg  Loss of Consciousness, memeory loss or forgetfulness, confusion, difficulty concentrating, seizures, insomina, aphasia or dysarthria unilateral weakness or  ataxia, syncope, tremor, or H/O head trauma. No longer with headaches  Right hand Numbness and tingling. Wakes at night shaking hand. PSYCHIATRIC:  Neg personality changes, nervousness, drug or alcohol use/abuse, H/O psych counseling: Yes, Psychotropics: Yes, Effexor XR. Depression and anxiety well controlled even after death of father this past July. SKIN :  Neg  Rash, nail changes, sun burns, tattoos, change in moles, or skin color changes  ENDOCRINE:  Neg  Polydipsia,polyuria,abnormal weight changes,heat /cold intolerance, Hair changes. No H/O Diabetes or Thyroid disease. Preventive Care:    Health Maintenance   Topic Date Due    COVID-19 Vaccine (4 - Booster for Pfizer series) 04/10/2022    A1C test (Diabetic or Prediabetic)  04/12/2023    Lipids  04/12/2023    Depression Monitoring  04/12/2023    Colorectal Cancer Screen  06/15/2023    Breast cancer screen  08/20/2023    DTaP/Tdap/Td vaccine (2 - Td or Tdap) 09/05/2024    Flu vaccine  Completed    Shingles vaccine  Completed    Hepatitis C screen  Completed    HIV screen  Completed    Hepatitis A vaccine  Aged Out    Hib vaccine  Aged Out    Meningococcal (ACWY) vaccine  Aged Out    Pneumococcal 0-64 years Vaccine  Aged Out      Hx abnormal PAP: Once. Sexual activity: single partner, contraception - status post hysterectomy   Self-breast exams: yes  Last eye exam: 2022, normal, glasses  Exercise: no regular exercise  Seatbelt use: Yes  Sunscreen use:Yes  Dentist: [facility], every 6 months    Physical Exam    Vitals:    10/14/22 0902   BP: 112/84   Pulse: 71   Resp: 14   SpO2: 98%   Weight: 185 lb (83.9 kg)   Height: 5' 2\" (1.575 m)     Body mass index is 33.84 kg/m².      Wt Readings from Last 3 Encounters:   10/14/22 185 lb (83.9 kg)   08/08/22 183 lb (83 kg)   04/27/22 191 lb 12.8 oz (87 kg)     BP Readings from Last 3 Encounters:   10/14/22 112/84   08/08/22 125/87   04/27/22 103/67        GEN:  49 y.o. female who is in NAD, A&O. She appears stated age and well nourished. She is cooperative and pleasant. Overweight. Gen. he appears healthy  HEAD:  NC/AT, no lesions. EYES:  SASHA, EOMI, No scleral icterus or conjunctival injection or discharge. Visual fields in tact to confrontation. Fundoscopic difficult due to constricted pupils  EARS:  EAC's clear, TM's normal.  NOSE:  Nasal cavity is clear. No mucosal congestion or discharge. Sinuses are nontender. MOUTH & THROAT:  Oral cavity is clear without mucosal lesions. Tongue is midline. Dentition is in good repair. No pharyngeal erythema or exudate. NECK:  Supple. Full ROM. Trachea is midline. No increased JVD.  + Thyromegaly, no nodules  LYMPH: No C/SC/A/F nodes  CARDIAC:  S1S2 NL. Regular rhythm. No murmur/clicks/rubs. No ectopy. PMI is non-displaced. VASC:  Pedal pulses 2/4. Carotid upstrokes 2+. No bruits noted. PULM:  Lungs are CTA. Symmetric breath sounds noted. AP Diameter NL. GI:  Abdomen is soft and nontender. No distension. No organomegaly. No masses. No pulsatile masses. EXT:  No Cyanosis or clubbing. No edema. SKIN: Warm and dry, normal turgor, no rash or lesions of concern. NEURO:  Cranial nerves 2-12 are NL. Speech fluent and coherent. Strength is 5/5 in all muscle groups. No sensory deficits. No focal or lateralizing deficits. Gait is normal.  BL achilles reflex now are symmetric. No lateralizing deficits  MS:  No C/T/L paraspinal tenderness. No scoliosis. No joint effusions. Full joint ROM. PSYCH:  Mood and affect NL. Judgement and insight NL.          ASSESSMENT/PLAN:    1. Annual physical exam  All care gaps identified and addressed  Recommended flu vaccine-given today  Recommended Shingrix No. 2-given today  - CBC with Auto Differential; Future  - Comprehensive Metabolic Panel; Future  - Lipid Panel; Future  - Hemoglobin A1C; Future    2.  Witnessed apneic spells  Concern for sleep apnea  Refer to sleep  - [facility] - Grayce Denver, MD, Sleep Medicine, [facility]    3. Snoring  Refer to sleep  Rule out sleep apnea  [facility]    4. Need for shingles vaccine  Shingrix No. 2  - Zoster, SHINGRIX, (18 yrs +), IM    5. Benign essential HTN  Well-controlled  Continue current medication  - CBC with Auto Differential; Future  - Comprehensive Metabolic Panel; Future  - Lipid Panel; Future    6. Hyperlipidemia, mixed    - Comprehensive Metabolic Panel; Future  - Lipid Panel; Future    7. Impaired fasting blood sugar    - Comprehensive Metabolic Panel; Future  - Hemoglobin A1C; Future                     Preventive plan of care for Nanda Iyer        10/14/2022           Preventive Measures Status       Recommendations for screening   Colon Cancer Screen   Last colonoscopy: 6/15/2018 Colonoscopy due every 5 years--due June 2023   Breast Cancer Screen  Last mammogram: 8/20/2021 Repeat yearly   Cervical Cancer Screen   Last PAP smear: 2015 Repeat cervical cancer screening is not clinically indicated due to hysterectomy status.   Please visit with her gynecologist for an annual breast and pelvic exam-   Osteoporosis Screen   Last DXA scan: None This test is not clinically indicated   Diabetes Screen  Glucose (mg/dL)   Date Value   04/12/2022 109 (H)    Test recommended and ordered   Cholesterol Screen  Lab Results   Component Value Date    CHOL 201 (H) 04/12/2022    TRIG 148 04/12/2022    HDL 51 04/12/2022    LDLCALC 120 (H) 04/12/2022    Test recommended and ordered   Hepatitis C screening recommended for those between the ages of 18-79--4/12/2022   No need to repeat at this time   HIV screening recommended for those ages 12-76 NO MATTER THE RISK FOR HIV--  10/7/2016 No need to repeat at this time   Aspirin for Cardiovascular Prevention   No Not indicated    Recommended Immunizations    Immunization History   Administered Date(s) Administered    COVID-19, PFIZER PURPLE top, DILUTE for use, (age 15 y+), 30mcg/0.3mL 03/23/2021, 04/14/2021, 12/10/2021    Influenza 11/05/2010, 09/24/2013    Influenza Virus Vaccine 09/05/2014, 10/05/2015    Influenza, FLUARIX, FLULAVAL, Marcille Fly (age 10 mo+) AND AFLURIA, (age 1 y+), PF, 0.5mL 10/25/2019, 10/26/2020    Influenza, FLUCELVAX, (age 10 mo+), MDCK, PF, 0.5mL 10/14/2022    PPD Test 01/31/2006    Tdap (Boostrix, Adacel) 09/05/2014    Tetanus 01/31/2006    Zoster Recombinant (Shingrix) 04/12/2022, 10/14/2022        Influenza vaccine:  recommended every fall--Given today    Pneumonia vaccine: Pneumovax Due at age 72    Tetanus vaccine:  tetanus and diptheria/Pertussis vaccine (Td/Tdap) recommended every 10 years- Td due 2024     Shingles vaccine:  Shingrx #2 due at this time    COVID-19 updated booster-- Recommended. Additional Recommendations   1. Use Sunscreen daily to help reduce the risk of skin cancer. 2. Continue a healthy lifestyle including a low fat and low carb diet along with regular aerobic exercise   3. Please schedule an eye exam.    4. Always wear a seatbelt while in a car  5. Always wear helmet when riding bicycle      Here are a few  Reliable websites with a variety of health and wellness information:   www.mylifecheck. heart. org     www.nutritionsource. org     www. americanheart. org     www. diabetes. org      www.menopause. org     www.[email or web address]     [email or web address] [facility])

## 2022-10-29 DIAGNOSIS — F33.0 MAJOR DEPRESSIVE DISORDER, RECURRENT EPISODE, MILD (HCC): ICD-10-CM

## 2022-10-30 RX ORDER — VENLAFAXINE HYDROCHLORIDE 150 MG/1
150 CAPSULE, EXTENDED RELEASE ORAL DAILY
Qty: 90 CAPSULE | Refills: 3 | Status: SHIPPED | OUTPATIENT
Start: 2022-10-30

## 2022-11-14 DIAGNOSIS — E05.90 HYPERTHYROIDISM: ICD-10-CM

## 2022-11-14 LAB
T3 FREE: 2.7 PG/ML (ref 2.3–4.2)
T4 FREE: 0.7 NG/DL (ref 0.9–1.8)
TSH REFLEX: 2.36 UIU/ML (ref 0.27–4.2)

## 2022-11-15 ENCOUNTER — OFFICE VISIT (OUTPATIENT)
Dept: ENDOCRINOLOGY | Age: 52
End: 2022-11-15
Payer: COMMERCIAL

## 2022-11-15 VITALS
RESPIRATION RATE: 14 BRPM | WEIGHT: 184 LBS | SYSTOLIC BLOOD PRESSURE: 130 MMHG | HEART RATE: 89 BPM | DIASTOLIC BLOOD PRESSURE: 88 MMHG | BODY MASS INDEX: 33.86 KG/M2 | TEMPERATURE: 98 F | HEIGHT: 62 IN

## 2022-11-15 DIAGNOSIS — E05.90 HYPERTHYROIDISM: Primary | ICD-10-CM

## 2022-11-15 PROCEDURE — 3074F SYST BP LT 130 MM HG: CPT | Performed by: INTERNAL MEDICINE

## 2022-11-15 PROCEDURE — 3078F DIAST BP <80 MM HG: CPT | Performed by: INTERNAL MEDICINE

## 2022-11-15 PROCEDURE — 99214 OFFICE O/P EST MOD 30 MIN: CPT | Performed by: INTERNAL MEDICINE

## 2022-11-15 NOTE — PROGRESS NOTES
Subjective:      54 y/o WF who is here for thyroid evaluation.      Referred by Dr. Julianne Naylor    Interim:     Mom Dx with colon cancer  Stable    occasional palpitations  Improved with tapazole  Lost weight intentionally    Reports some blurred vision  Did not see  Dr. Aldair Morley    She was diagnosed with hyperthyroidism    Had work up done    No neck pain, fever chills     TSH 0.007 FT3 4.97  FT4 1.28  TSI 1.57  10/20 TSH 0.09  10/19 TSH 1.35     Thyroid scan:     FINDINGS:    THYROID APPEARANCE:  Normal size and configuration with normal homogenous uptake   4 HOUR THYROID IODINE UPTAKE:  12.9% ( normal is 6-18%)   24 HOUR THYROID IODINE UPTAKE:  33.2% (normal is 10-35%)     Normal USG    Initial complaints: headache, palpitations, reports fast heart rate , memory fog, tremors, weight loss 14 lb, heat intolerance    Symptoms for a few weeks  Palpitations for months  No eye symptoms  History of obstructive symptoms:  difficulty swallowing no    History of radiation to patient's neck: no  Recent iodine exposure: no  Family history includes Mom Hashimoto's hypothyroidism/ maternal aunt  Family history of thyroid cancer: no    TSH <0.01 FT4 1.8 FT3 7.8  Trab -ve    TSH <0.01 T3 1.72 FT3 4.2 Tapazole 10mg      TSH 0.2 FT4 0.8  Tapazole 10mg     TSH 3.21 FT4 0.6 FT3 2.5    TSH 9.63 FT4 0.7  On 7.5mg   TSH 1.98 FT4 0.8   On 2.5mg   TSH 0.21 FT4 0.9 FT3  3.8  Off tapazole   TSH 0.05 FT4 0.8 FT3 2.9  tapazole 5mg   TSH 2.36 FT4 0.7  FT3 2.7    She has HTN, takes losartan and HCTZ    Past Medical History:   Diagnosis Date    Allergic rhinitis     Caesarean Section     7/10/94    Depression     well controlled    Dysplastic nevus 7/21/10    Hyperlipidemia     Hypertension     Impaired fasting glucose      Past Surgical History:   Procedure Laterality Date     SECTION      COLONOSCOPY  06/15/2018    Dr. Pema Aguiar  2013    d&c swelling      Lab Review  Lab Results   Component Value Date/Time    TSH 0.21 04/12/2022 09:29 AM     No results found for: FREET4      Assessment:     Hyperthyroidism: Mild, thyroid scan high normal uptake, TSI negative. USG is unremarkable. Likely seronegative Graves' disease. Started on  tapazole. Discussed alternative Tx with CRUZ  Feels better  Given Symptoms started on Inderal, now off of it  Discussed side effects including rash and elevated LFT with tapazole  TSH nl, FT4 is low, some discordant in th past in level. FT3 is normal, adjust level  Advised to see opthalmology for the double vision    HTN:    Palpitations: Thyroid level not high, stop inderal.    Plan: 1. Tapazole 5mg---> 2.5mg  2. Check TFT in 12 weeks

## 2022-12-29 RX ORDER — HYDROCHLOROTHIAZIDE 25 MG/1
TABLET ORAL
Qty: 90 TABLET | Refills: 3 | Status: SHIPPED | OUTPATIENT
Start: 2022-12-29

## 2023-01-09 ENCOUNTER — OFFICE VISIT (OUTPATIENT)
Dept: INTERNAL MEDICINE CLINIC | Age: 53
End: 2023-01-09
Payer: COMMERCIAL

## 2023-01-09 VITALS
HEIGHT: 62 IN | DIASTOLIC BLOOD PRESSURE: 82 MMHG | OXYGEN SATURATION: 99 % | WEIGHT: 185.4 LBS | HEART RATE: 98 BPM | RESPIRATION RATE: 14 BRPM | SYSTOLIC BLOOD PRESSURE: 116 MMHG | BODY MASS INDEX: 34.12 KG/M2

## 2023-01-09 DIAGNOSIS — R10.10 UPPER ABDOMINAL PAIN: ICD-10-CM

## 2023-01-09 DIAGNOSIS — R10.32 LLQ ABDOMINAL PAIN: Primary | ICD-10-CM

## 2023-01-09 DIAGNOSIS — R10.32 LLQ ABDOMINAL PAIN: ICD-10-CM

## 2023-01-09 DIAGNOSIS — Z80.9 FAMILY HISTORY OF CANCER: ICD-10-CM

## 2023-01-09 LAB
A/G RATIO: 1.2 (ref 1.1–2.2)
ALBUMIN SERPL-MCNC: 4.4 G/DL (ref 3.4–5)
ALP BLD-CCNC: 75 U/L (ref 40–129)
ALT SERPL-CCNC: 15 U/L (ref 10–40)
ANION GAP SERPL CALCULATED.3IONS-SCNC: 11 MMOL/L (ref 3–16)
AST SERPL-CCNC: 20 U/L (ref 15–37)
BASOPHILS ABSOLUTE: 0.1 K/UL (ref 0–0.2)
BASOPHILS RELATIVE PERCENT: 0.9 %
BILIRUB SERPL-MCNC: 0.3 MG/DL (ref 0–1)
BUN BLDV-MCNC: 11 MG/DL (ref 7–20)
C-REACTIVE PROTEIN: 3.8 MG/L (ref 0–5.1)
CALCIUM SERPL-MCNC: 9.7 MG/DL (ref 8.3–10.6)
CHLORIDE BLD-SCNC: 101 MMOL/L (ref 99–110)
CO2: 29 MMOL/L (ref 21–32)
CREAT SERPL-MCNC: 0.8 MG/DL (ref 0.6–1.1)
EOSINOPHILS ABSOLUTE: 0.1 K/UL (ref 0–0.6)
EOSINOPHILS RELATIVE PERCENT: 1.3 %
GFR SERPL CREATININE-BSD FRML MDRD: >60 ML/MIN/{1.73_M2}
GLUCOSE BLD-MCNC: 136 MG/DL (ref 70–99)
HCT VFR BLD CALC: 41.9 % (ref 36–48)
HEMOGLOBIN: 13.7 G/DL (ref 12–16)
LYMPHOCYTES ABSOLUTE: 2.8 K/UL (ref 1–5.1)
LYMPHOCYTES RELATIVE PERCENT: 45.2 %
MCH RBC QN AUTO: 29.8 PG (ref 26–34)
MCHC RBC AUTO-ENTMCNC: 32.6 G/DL (ref 31–36)
MCV RBC AUTO: 91.4 FL (ref 80–100)
MONOCYTES ABSOLUTE: 0.4 K/UL (ref 0–1.3)
MONOCYTES RELATIVE PERCENT: 6.8 %
NEUTROPHILS ABSOLUTE: 2.8 K/UL (ref 1.7–7.7)
NEUTROPHILS RELATIVE PERCENT: 45.8 %
PDW BLD-RTO: 12.4 % (ref 12.4–15.4)
PLATELET # BLD: 219 K/UL (ref 135–450)
PMV BLD AUTO: 9.7 FL (ref 5–10.5)
POTASSIUM SERPL-SCNC: 3.5 MMOL/L (ref 3.5–5.1)
RBC # BLD: 4.59 M/UL (ref 4–5.2)
SEDIMENTATION RATE, ERYTHROCYTE: 65 MM/HR (ref 0–30)
SODIUM BLD-SCNC: 141 MMOL/L (ref 136–145)
TOTAL PROTEIN: 8 G/DL (ref 6.4–8.2)
WBC # BLD: 6.2 K/UL (ref 4–11)

## 2023-01-09 PROCEDURE — 99214 OFFICE O/P EST MOD 30 MIN: CPT | Performed by: INTERNAL MEDICINE

## 2023-01-09 PROCEDURE — 3079F DIAST BP 80-89 MM HG: CPT | Performed by: INTERNAL MEDICINE

## 2023-01-09 PROCEDURE — 3074F SYST BP LT 130 MM HG: CPT | Performed by: INTERNAL MEDICINE

## 2023-01-09 SDOH — ECONOMIC STABILITY: FOOD INSECURITY: WITHIN THE PAST 12 MONTHS, THE FOOD YOU BOUGHT JUST DIDN'T LAST AND YOU DIDN'T HAVE MONEY TO GET MORE.: NEVER TRUE

## 2023-01-09 SDOH — ECONOMIC STABILITY: FOOD INSECURITY: WITHIN THE PAST 12 MONTHS, YOU WORRIED THAT YOUR FOOD WOULD RUN OUT BEFORE YOU GOT MONEY TO BUY MORE.: NEVER TRUE

## 2023-01-09 ASSESSMENT — PATIENT HEALTH QUESTIONNAIRE - PHQ9
SUM OF ALL RESPONSES TO PHQ QUESTIONS 1-9: 11
10. IF YOU CHECKED OFF ANY PROBLEMS, HOW DIFFICULT HAVE THESE PROBLEMS MADE IT FOR YOU TO DO YOUR WORK, TAKE CARE OF THINGS AT HOME, OR GET ALONG WITH OTHER PEOPLE: 0
8. MOVING OR SPEAKING SO SLOWLY THAT OTHER PEOPLE COULD HAVE NOTICED. OR THE OPPOSITE, BEING SO FIGETY OR RESTLESS THAT YOU HAVE BEEN MOVING AROUND A LOT MORE THAN USUAL: 1
5. POOR APPETITE OR OVEREATING: 2
9. THOUGHTS THAT YOU WOULD BE BETTER OFF DEAD, OR OF HURTING YOURSELF: 1
7. TROUBLE CONCENTRATING ON THINGS, SUCH AS READING THE NEWSPAPER OR WATCHING TELEVISION: 1
6. FEELING BAD ABOUT YOURSELF - OR THAT YOU ARE A FAILURE OR HAVE LET YOURSELF OR YOUR FAMILY DOWN: 1
1. LITTLE INTEREST OR PLEASURE IN DOING THINGS: 1
3. TROUBLE FALLING OR STAYING ASLEEP: 2
SUM OF ALL RESPONSES TO PHQ QUESTIONS 1-9: 10
SUM OF ALL RESPONSES TO PHQ QUESTIONS 1-9: 11
SUM OF ALL RESPONSES TO PHQ QUESTIONS 1-9: 11
2. FEELING DOWN, DEPRESSED OR HOPELESS: 1
SUM OF ALL RESPONSES TO PHQ9 QUESTIONS 1 & 2: 2
4. FEELING TIRED OR HAVING LITTLE ENERGY: 1

## 2023-01-09 ASSESSMENT — SOCIAL DETERMINANTS OF HEALTH (SDOH): HOW HARD IS IT FOR YOU TO PAY FOR THE VERY BASICS LIKE FOOD, HOUSING, MEDICAL CARE, AND HEATING?: NOT HARD AT ALL

## 2023-01-09 NOTE — PROGRESS NOTES
[facility]) Physicians  Internal Medicine  Patient Encounter  Bam Ramirez D.O., [facility]        Chief Complaint   Patient presents with    Abdominal Pain     Dull lower left stomach pain since mid Nov.; Nauseous since last week; patient was told by OBGYN she has ovarian cyst on        HPI: 47 y.o. female seen urgently today with complaint of left lower quadrant abdominal pain. Actually, the patient states that sometimes her discomfort is in the left lower quadrant but she can also feel discomfort in the right lower quadrant and upper abdomen. Symptoms have been ongoing for about a month. She thinks her symptoms started just before her mother  on 2022. Her mother  of colorectal cancer. Patient states the discomfort is a dull ache. She denies any sharp or cramping pain. She does not feel like she is getting any worse but she is not getting any better. She denies associated melena or hematochezia. She has had intermittent nausea. She denies any heartburn or waterbrash. She went to see her gynecologist who, during her examination elicited left lower quadrant tenderness. A pelvic ultrasound was obtained which revealed an ovarian cyst on the left. Patient states that the cyst was not ruptured. She thinks a urinalysis was completed and this was negative. She denies any associated fever, chills, sweats. She denies any constipation or diarrhea. She denies any dysuria or hematuria. Her last colonoscopy was 2018. She is due for a 5-year recall this . Patient last ate just before she came to the office. Patient is also still quite upset about her mother's passing. She is also expressed interest in genetic testing regarding cancers. There are several cancers in her family including colorectal, breast, kidney, bladder, prostate.     Past Medical History:   Diagnosis Date    Allergic rhinitis     Caesarean Section     7/10/94    Depression     well controlled    Dysplastic nevus 7/21/10    Hyperlipidemia     Hypertension     Impaired fasting glucose          MEDICATIONS:  Prior to Visit Medications    Medication Sig   hydroCHLOROthiazide (HYDRODIURIL) 25 MG tablet TAKE 1 TABLET DAILY   venlafaxine (EFFEXOR XR) 150 MG extended release capsule Take 1 capsule by mouth daily   methIMAzole (TAPAZOLE) 5 MG tablet Take 1 tablet by mouth daily   losartan (COZAAR) 100 MG tablet TAKE 1 TABLET DAILY   pravastatin (PRAVACHOL) 20 MG tablet TAKE 1 TABLET DAILY   potassium chloride (KLOR-CON M20) 20 MEQ extended release tablet TAKE 1 TABLET TWICE A DAY   cetirizine (ZYRTEC) 10 MG tablet Take 10 mg by mouth daily   meclizine (ANTIVERT) 25 MG tablet Take 1 tablet by mouth 3 times daily as needed . Use as needed for vertigo           Review of Systems - As per HPI      OBJECTIVE:  Vitals:    01/09/23 1407   BP: 116/82   Pulse: 98   Resp: 14   SpO2: 99%   Weight: 185 lb 6.4 oz (84.1 kg)   Height: 5' 2\" (1.575 m)     Body mass index is 33.91 kg/m². Wt Readings from Last 3 Encounters:   01/09/23 185 lb 6.4 oz (84.1 kg)   11/15/22 184 lb (83.5 kg)   10/14/22 185 lb (83.9 kg)     BP Readings from Last 3 Encounters:   01/09/23 116/82   11/15/22 130/88   10/14/22 112/84        GEN: NAD, A&O, Non-toxic  HEENT: NC/AT, CELINA, EOMI, Oral cavity Clear,  TM's NL, Nasal cavity clear. NECK: Supple. No thyromegaly. No JVD  LYMPH: No C/SC nodes. CV: Regular rhythm. No ectopy. PULM: CTA  EXT: No edema  GI: Abdomen is soft with focal left lower quadrant abdominal tenderness. No masses. No peritoneal signs. No pain with percussion. NEURO: No focal or lateralizing deficits. VASC:  No carotid bruits. Pedal pulses symmetric  SKIN:  No rashes or lesions of concern      ASSESSMENT/PLAN:    1.  LLQ abdominal pain  Etiology is unclear  Rule out diverticulitis  Rule out diverticulosis  Rule out pelvic pathology such as ovarian cyst or ovarian rupture  - CT ABDOMEN PELVIS W IV CONTRAST Additional Contrast? None; Future  - CBC with Auto Differential; Future  - C-Reactive Protein; Future  - Sedimentation Rate; Future  - Comprehensive Metabolic Panel; Future    2. Upper abdominal pain    - CBC with Auto Differential; Future  - C-Reactive Protein; Future  - Sedimentation Rate; Future  - Comprehensive Metabolic Panel; Future    3. Family history of cancer  Patient with multiple cancers in the family  Refer to [facility] for genetic counseling and testing. Advised patient to call should symptoms persist, worsen or if new symptoms develop. Discussed medications with patient who voiced understanding of their use, indication and potential side effects. Pt also understands the above recommendations. All questions answered. This note was generated completely or in part utilizing Dragon dictation speech recognition software. Occasionally, words are mistranscribed and despite editing, the text may contain inaccuracies due to incorrect word recognition.   If further clarification is needed please contact the office at [phone]       Electronically signed    Rae Cano D.O.

## 2023-01-10 ENCOUNTER — HOSPITAL ENCOUNTER (OUTPATIENT)
Dept: CT IMAGING | Age: 53
Discharge: HOME | End: 2023-01-10
Payer: COMMERCIAL

## 2023-01-10 DIAGNOSIS — R10.32 LLQ ABDOMINAL PAIN: ICD-10-CM

## 2023-01-10 PROCEDURE — 74177 CT ABD & PELVIS W/CONTRAST: CPT

## 2023-01-10 PROCEDURE — 6360000004 HC RX CONTRAST MEDICATION: Performed by: INTERNAL MEDICINE

## 2023-01-10 RX ADMIN — IOPAMIDOL 75 ML: 755 INJECTION, SOLUTION INTRAVENOUS at 10:09

## 2023-02-20 DIAGNOSIS — E05.90 HYPERTHYROIDISM: ICD-10-CM

## 2023-02-20 LAB
T3 FREE: 2.5 PG/ML (ref 2.3–4.2)
T4 FREE: 0.7 NG/DL (ref 0.9–1.8)
TSH REFLEX: 1.28 UIU/ML (ref 0.27–4.2)

## 2023-02-21 ENCOUNTER — OFFICE VISIT (OUTPATIENT)
Dept: ENDOCRINOLOGY | Age: 53
End: 2023-02-21
Payer: COMMERCIAL

## 2023-02-21 VITALS
RESPIRATION RATE: 14 BRPM | SYSTOLIC BLOOD PRESSURE: 133 MMHG | DIASTOLIC BLOOD PRESSURE: 84 MMHG | WEIGHT: 185.4 LBS | HEART RATE: 84 BPM | TEMPERATURE: 98 F | BODY MASS INDEX: 34.12 KG/M2 | HEIGHT: 62 IN

## 2023-02-21 DIAGNOSIS — E05.90 HYPERTHYROIDISM: Primary | ICD-10-CM

## 2023-02-21 PROCEDURE — 3075F SYST BP GE 130 - 139MM HG: CPT | Performed by: INTERNAL MEDICINE

## 2023-02-21 PROCEDURE — 3079F DIAST BP 80-89 MM HG: CPT | Performed by: INTERNAL MEDICINE

## 2023-02-21 PROCEDURE — 99214 OFFICE O/P EST MOD 30 MIN: CPT | Performed by: INTERNAL MEDICINE

## 2023-02-21 NOTE — PROGRESS NOTES
Subjective:      52 y/o WF who is here for thyroid evaluation.      Referred by Dr. Laurie Connelly    Interim:     Mom Dx with colon cancer  Stable    occasional palpitations  Improved with tapazole  Lost weight intentionally    Reports some blurred vision  Did not see  Dr. Gilford Grimes    She was diagnosed with hyperthyroidism    Had work up done    No neck pain, fever chills     TSH 0.007 FT3 4.97  FT4 1.28  TSI 1.57  10/20 TSH 0.09  10/19 TSH 1.35     Thyroid scan:     FINDINGS:    THYROID APPEARANCE:  Normal size and configuration with normal homogenous uptake   4 HOUR THYROID IODINE UPTAKE:  12.9% ( normal is 6-18%)   24 HOUR THYROID IODINE UPTAKE:  33.2% (normal is 10-35%)     Normal USG    Initial complaints: headache, palpitations, reports fast heart rate , memory fog, tremors, weight loss 14 lb, heat intolerance    Symptoms for a few weeks  Palpitations for months  No eye symptoms  History of obstructive symptoms:  difficulty swallowing no    History of radiation to patient's neck: no  Recent iodine exposure: no  Family history includes Mom Hashimoto's hypothyroidism/ maternal aunt  Family history of thyroid cancer: no    TSH <0.01 FT4 1.8 FT3 7.8  Trab -ve    TSH <0.01 T3 1.72 FT3 4.2 Tapazole 10mg      TSH 0.2 FT4 0.8  Tapazole 10mg     TSH 3.21 FT4 0.6 FT3 2.5    TSH 9.63 FT4 0.7  On 7.5mg   TSH 1.98 FT4 0.8   On 2.5mg   TSH 0.21 FT4 0.9 FT3  3.8  Off tapazole   TSH 0.05 FT4 0.8 FT3 2.9  tapazole 5mg   TSH 2.36 FT4 0.7  FT3 2.7   TSH 1.28 FT4 0.7 FT3 2.5  on tapazole 2.5mg    She has HTN, takes losartan and HCTZ    Past Medical History:   Diagnosis Date    Allergic rhinitis     Caesarean Section     7/10/94    Depression     well controlled    Dysplastic nevus 7/21/10    Hyperlipidemia     Hypertension     Impaired fasting glucose      Past Surgical History:   Procedure Laterality Date     SECTION      COLONOSCOPY  06/15/2018    Dr. Sandrita Lutz AND CURETTAGE OF UTERUS  2-    d&c hysteroscopy and polypectomy    HYSTERECTOMY, VAGINAL  05/21/2013    partial    LASIK  3/2015    Dr. Phylicia Cardoso EXTRACTION       Current Outpatient Medications   Medication Sig Dispense Refill    hydroCHLOROthiazide (HYDRODIURIL) 25 MG tablet TAKE 1 TABLET DAILY 90 tablet 3    venlafaxine (EFFEXOR XR) 150 MG extended release capsule Take 1 capsule by mouth daily 90 capsule 3    methIMAzole (TAPAZOLE) 5 MG tablet Take 1 tablet by mouth daily (Patient taking differently: Take 2.5 mg by mouth daily) 30 tablet 3    losartan (COZAAR) 100 MG tablet TAKE 1 TABLET DAILY 90 tablet 3    pravastatin (PRAVACHOL) 20 MG tablet TAKE 1 TABLET DAILY 90 tablet 3    potassium chloride (KLOR-CON M20) 20 MEQ extended release tablet TAKE 1 TABLET TWICE A  tablet 3    cetirizine (ZYRTEC) 10 MG tablet Take 10 mg by mouth daily      meclizine (ANTIVERT) 25 MG tablet Take 1 tablet by mouth 3 times daily as needed . Use as needed for vertigo       No current facility-administered medications for this visit. 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 thyroid issues    Review of Systems  Please see scanned     Objective:    /84   Pulse 84   Temp 98 °F (36.7 °C)   Resp 14   Ht 5' 2\" (1.575 m)   Wt 185 lb 6.4 oz (84.1 kg)   LMP 01/22/2013   BMI 33.91 kg/m²   Wt Readings from Last 3 Encounters:   02/21/23 185 lb 6.4 oz (84.1 kg)   01/09/23 185 lb 6.4 oz (84.1 kg)   11/15/22 184 lb (83.5 kg)   ]  Vitals:    02/21/23 1058   BP: 133/84   Pulse: 84   Resp: 14   Temp: 98 °F (36.7 °C)           Constitutional: Well-developed, appears stated age, cooperative, in no acute distress  H/E/N/M/T:atraumatic, normocephalic, external ears, nose, lips normal without lesions  Eyes: Lids, lashes, conjunctivae and sclerae normal, No proptosis, no redness  Neck: supple, symmetrical, no swelling  Thyroid slight enlargement  Skin: No obvious rashes or lesions present.   Skin and hair texture normal  Psychiatric: Judgement and Insight:  judgement and insight appear normal  Neuro: Normal without focal findings, speech is normal normal, speech is spontaneous  Chest: No labored breathing, no chest deformity, no stridor  Musculoskeletal: No joint deformity, swelling      Lab Review  Lab Results   Component Value Date/Time    TSH 0.21 04/12/2022 09:29 AM     No results found for: FREET4      Assessment:     Hyperthyroidism: Mild, thyroid scan high normal uptake, TSI negative. USG is unremarkable. Likely seronegative Graves' disease. Started on  tapazole. Discussed alternative Tx with CRUZ  Feels better  Given Symptoms started on Inderal, now off of it  Discussed side effects including rash and elevated LFT with tapazole  TSH nl, FT4 is low, some discordant in th past in level. FT3 is normal, will hold tapazole and assess for remission. Advised to see opthalmology for the pressure in eyes. She had seen in the past, was told does not have MARVA    HTN:    Palpitations: Thyroid level not high, stopped inderal.    Plan: 1. Stop tapazole  2. Check TFT in 12 weeks

## 2023-02-24 DIAGNOSIS — R10.32 LLQ ABDOMINAL PAIN: Primary | ICD-10-CM

## 2023-04-24 RX ORDER — POTASSIUM CHLORIDE 20 MEQ/1
TABLET, EXTENDED RELEASE ORAL
Qty: 180 TABLET | Refills: 3 | Status: SHIPPED | OUTPATIENT
Start: 2023-04-24

## 2023-04-24 RX ORDER — POTASSIUM CHLORIDE 20 MEQ/1
TABLET, EXTENDED RELEASE ORAL
Qty: 180 TABLET | Refills: 3 | OUTPATIENT
Start: 2023-04-24

## 2023-06-12 DIAGNOSIS — E05.90 HYPERTHYROIDISM: ICD-10-CM

## 2023-06-12 LAB
T3FREE SERPL-MCNC: 3.3 PG/ML (ref 2.3–4.2)
T4 FREE SERPL-MCNC: 0.9 NG/DL (ref 0.9–1.8)
TSH SERPL DL<=0.005 MIU/L-ACNC: 0.35 UIU/ML (ref 0.27–4.2)

## 2023-06-28 ENCOUNTER — TELEPHONE (OUTPATIENT)
Dept: INTERNAL MEDICINE CLINIC | Age: 53
End: 2023-06-28

## 2023-06-28 DIAGNOSIS — Z12.11 SCREEN FOR COLON CANCER: Primary | ICD-10-CM

## 2023-06-28 RX ORDER — LOSARTAN POTASSIUM 100 MG/1
TABLET ORAL
Qty: 90 TABLET | Refills: 3 | Status: SHIPPED | OUTPATIENT
Start: 2023-06-28

## 2023-06-28 RX ORDER — PRAVASTATIN SODIUM 20 MG
TABLET ORAL
Qty: 90 TABLET | Refills: 3 | Status: SHIPPED | OUTPATIENT
Start: 2023-06-28

## 2023-08-05 SDOH — ECONOMIC STABILITY: HOUSING INSECURITY
IN THE LAST 12 MONTHS, WAS THERE A TIME WHEN YOU DID NOT HAVE A STEADY PLACE TO SLEEP OR SLEPT IN A SHELTER (INCLUDING NOW)?: NO

## 2023-08-05 SDOH — ECONOMIC STABILITY: TRANSPORTATION INSECURITY
IN THE PAST 12 MONTHS, HAS LACK OF TRANSPORTATION KEPT YOU FROM MEETINGS, WORK, OR FROM GETTING THINGS NEEDED FOR DAILY LIVING?: NO

## 2023-08-05 SDOH — ECONOMIC STABILITY: INCOME INSECURITY: HOW HARD IS IT FOR YOU TO PAY FOR THE VERY BASICS LIKE FOOD, HOUSING, MEDICAL CARE, AND HEATING?: NOT HARD AT ALL

## 2023-08-05 SDOH — ECONOMIC STABILITY: FOOD INSECURITY: WITHIN THE PAST 12 MONTHS, THE FOOD YOU BOUGHT JUST DIDN'T LAST AND YOU DIDN'T HAVE MONEY TO GET MORE.: NEVER TRUE

## 2023-08-05 SDOH — ECONOMIC STABILITY: FOOD INSECURITY: WITHIN THE PAST 12 MONTHS, YOU WORRIED THAT YOUR FOOD WOULD RUN OUT BEFORE YOU GOT MONEY TO BUY MORE.: NEVER TRUE

## 2023-08-08 ENCOUNTER — OFFICE VISIT (OUTPATIENT)
Dept: INTERNAL MEDICINE CLINIC | Age: 53
End: 2023-08-08
Payer: COMMERCIAL

## 2023-08-08 VITALS
WEIGHT: 183 LBS | OXYGEN SATURATION: 98 % | HEART RATE: 80 BPM | HEIGHT: 62 IN | RESPIRATION RATE: 14 BRPM | BODY MASS INDEX: 33.68 KG/M2 | SYSTOLIC BLOOD PRESSURE: 114 MMHG | DIASTOLIC BLOOD PRESSURE: 70 MMHG

## 2023-08-08 DIAGNOSIS — I10 BENIGN ESSENTIAL HTN: ICD-10-CM

## 2023-08-08 DIAGNOSIS — F33.0 MAJOR DEPRESSIVE DISORDER, RECURRENT EPISODE, MILD (HCC): ICD-10-CM

## 2023-08-08 DIAGNOSIS — I10 BENIGN ESSENTIAL HTN: Primary | ICD-10-CM

## 2023-08-08 DIAGNOSIS — R73.01 IMPAIRED FASTING BLOOD SUGAR: ICD-10-CM

## 2023-08-08 DIAGNOSIS — E78.2 HYPERLIPIDEMIA, MIXED: ICD-10-CM

## 2023-08-08 DIAGNOSIS — E05.00 GRAVES' DISEASE: ICD-10-CM

## 2023-08-08 DIAGNOSIS — E88.810 METABOLIC SYNDROME: ICD-10-CM

## 2023-08-08 DIAGNOSIS — E88.81 METABOLIC SYNDROME: ICD-10-CM

## 2023-08-08 DIAGNOSIS — E66.09 CLASS 1 OBESITY DUE TO EXCESS CALORIES WITH SERIOUS COMORBIDITY AND BODY MASS INDEX (BMI) OF 33.0 TO 33.9 IN ADULT: ICD-10-CM

## 2023-08-08 PROBLEM — E66.811 CLASS 1 OBESITY DUE TO EXCESS CALORIES WITH SERIOUS COMORBIDITY AND BODY MASS INDEX (BMI) OF 33.0 TO 33.9 IN ADULT: Status: ACTIVE | Noted: 2023-08-08

## 2023-08-08 PROCEDURE — 3074F SYST BP LT 130 MM HG: CPT | Performed by: INTERNAL MEDICINE

## 2023-08-08 PROCEDURE — 3078F DIAST BP <80 MM HG: CPT | Performed by: INTERNAL MEDICINE

## 2023-08-08 PROCEDURE — 99214 OFFICE O/P EST MOD 30 MIN: CPT | Performed by: INTERNAL MEDICINE

## 2023-08-08 NOTE — PATIENT INSTRUCTIONS
To do list    #1 continue working with Lifestyle modification including low calorie diet focusing on Low fat/low cholesterol and low carbohydrate intake, along with  increasing cardiovascular (aerobic) exercise.      #2 consider the [facility] [employer] program.

## 2023-08-08 NOTE — PROGRESS NOTES
[address] Physicians  Internal Medicine  Patient Encounter  Pj Lei D.O., [facility]        Chief Complaint   Patient presents with    Check-Up    Medication Check       HPI: 47 y.o. female Who is seen requesting her routine check up and med reconciliation. Health maintenance items overdue:  Colon cancer screening--10/2023. COVID-19 vaccine booster  Flu vaccine    HTN--patient has had a long history of difficult to control blood pressure. She is doing much better. She denies any headaches, dizziness, lightheadedness, syncope. She is overdue for electrolytes and renal function testing    Hyperlipidemia--Patient continues on pravastatin. She denies any adverse side effects. She denies any myalgias or weakness. Patient is way overdue for lab. Lab Results   Component Value Date    LDLCALC 145 (H) 10/14/2022       Allergic rhinitis--She has been doing well. She has symptoms when she cuts grass. Depression--She states she is doing well. She is on Effexor. No new adverse effects. IFG--Patient denies any polys. She is overdue for lab. She denies any blurry vision. She has had difficulty maintaining any meaningful weight loss. Lab Results   Component Value Date    LABA1C 5.8 10/14/2022     Lab Results   Component Value Date    .8 10/14/2022      Hyperthyroidism-- Pt has Grave's Disease. She was seen 6/12/2023. She presented in 2021 with suppressed TSH. She was tremulousness. She is now off of Tapazole. Patient will have repeat lab ordered by her endocrinology. She denies any diarrhea, cold or heat intolerance, unexplained weight loss, voracious appetite, sweats, tremulousness.   Lab Results   Component Value Date    TSH 0.21 (L) 04/12/2022    TSHREFLEX 0.35 06/12/2023        Past Medical History:   Diagnosis Date    Allergic rhinitis     Caesarean Section     7/10/94    Depression     well controlled    Dysplastic nevus 7/21/10    Hyperlipidemia     Hypertension

## 2023-08-09 LAB
ALBUMIN SERPL-MCNC: 4.3 G/DL (ref 3.4–5)
ALBUMIN/GLOB SERPL: 1.3 {RATIO} (ref 1.1–2.2)
ALP SERPL-CCNC: 69 U/L (ref 40–129)
ALT SERPL-CCNC: 16 U/L (ref 10–40)
ANION GAP SERPL CALCULATED.3IONS-SCNC: 15 MMOL/L (ref 3–16)
AST SERPL-CCNC: 20 U/L (ref 15–37)
BASOPHILS # BLD: 0.1 K/UL (ref 0–0.2)
BASOPHILS NFR BLD: 1.3 %
BILIRUB SERPL-MCNC: 0.4 MG/DL (ref 0–1)
BUN SERPL-MCNC: 11 MG/DL (ref 7–20)
CALCIUM SERPL-MCNC: 9.7 MG/DL (ref 8.3–10.6)
CHLORIDE SERPL-SCNC: 99 MMOL/L (ref 99–110)
CHOLEST SERPL-MCNC: 210 MG/DL (ref 0–199)
CO2 SERPL-SCNC: 26 MMOL/L (ref 21–32)
CREAT SERPL-MCNC: 0.7 MG/DL (ref 0.6–1.1)
DEPRECATED RDW RBC AUTO: 13.1 % (ref 12.4–15.4)
EOSINOPHIL # BLD: 0.1 K/UL (ref 0–0.6)
EOSINOPHIL NFR BLD: 1.1 %
EST. AVERAGE GLUCOSE BLD GHB EST-MCNC: 122.6 MG/DL
GFR SERPLBLD CREATININE-BSD FMLA CKD-EPI: >60 ML/MIN/{1.73_M2}
GLUCOSE SERPL-MCNC: 104 MG/DL (ref 70–99)
HBA1C MFR BLD: 5.9 %
HCT VFR BLD AUTO: 39.3 % (ref 36–48)
HDLC SERPL-MCNC: 50 MG/DL (ref 40–60)
HGB BLD-MCNC: 13.4 G/DL (ref 12–16)
LDLC SERPL CALC-MCNC: 116 MG/DL
LYMPHOCYTES # BLD: 2.7 K/UL (ref 1–5.1)
LYMPHOCYTES NFR BLD: 48.3 %
MCH RBC QN AUTO: 31.2 PG (ref 26–34)
MCHC RBC AUTO-ENTMCNC: 34 G/DL (ref 31–36)
MCV RBC AUTO: 91.9 FL (ref 80–100)
MONOCYTES # BLD: 0.4 K/UL (ref 0–1.3)
MONOCYTES NFR BLD: 7.2 %
NEUTROPHILS # BLD: 2.3 K/UL (ref 1.7–7.7)
NEUTROPHILS NFR BLD: 42.1 %
PLATELET # BLD AUTO: 211 K/UL (ref 135–450)
PMV BLD AUTO: 9.1 FL (ref 5–10.5)
POTASSIUM SERPL-SCNC: 3.7 MMOL/L (ref 3.5–5.1)
PROT SERPL-MCNC: 7.6 G/DL (ref 6.4–8.2)
RBC # BLD AUTO: 4.28 M/UL (ref 4–5.2)
SODIUM SERPL-SCNC: 140 MMOL/L (ref 136–145)
TRIGL SERPL-MCNC: 219 MG/DL (ref 0–150)
VLDLC SERPL CALC-MCNC: 44 MG/DL
WBC # BLD AUTO: 5.6 K/UL (ref 4–11)

## 2023-10-17 ENCOUNTER — OFFICE VISIT (OUTPATIENT)
Dept: ENDOCRINOLOGY | Age: 53
End: 2023-10-17
Payer: COMMERCIAL

## 2023-10-17 VITALS
BODY MASS INDEX: 33.13 KG/M2 | DIASTOLIC BLOOD PRESSURE: 88 MMHG | HEIGHT: 62 IN | SYSTOLIC BLOOD PRESSURE: 128 MMHG | TEMPERATURE: 98 F | HEART RATE: 97 BPM | RESPIRATION RATE: 14 BRPM | WEIGHT: 180 LBS | OXYGEN SATURATION: 97 %

## 2023-10-17 DIAGNOSIS — E05.90 HYPERTHYROIDISM: Primary | ICD-10-CM

## 2023-10-17 PROCEDURE — 3074F SYST BP LT 130 MM HG: CPT | Performed by: INTERNAL MEDICINE

## 2023-10-17 PROCEDURE — 3079F DIAST BP 80-89 MM HG: CPT | Performed by: INTERNAL MEDICINE

## 2023-10-17 PROCEDURE — 99213 OFFICE O/P EST LOW 20 MIN: CPT | Performed by: INTERNAL MEDICINE

## 2023-10-17 NOTE — PROGRESS NOTES
normal  Psychiatric: Judgement and Insight:  judgement and insight appear normal  Neuro: Normal without focal findings, speech is normal normal, speech is spontaneous  Chest: No labored breathing, no chest deformity, no stridor  Musculoskeletal: No joint deformity, swelling      Lab Review  Lab Results   Component Value Date/Time    TSH 0.21 04/12/2022 09:29 AM     No results found for: \"FREET4\"      Assessment:     Hyperthyroidism: Mild, thyroid scan high normal uptake, TSI negative. USG is unremarkable. Likely seronegative Graves' disease. Started on  tapazole. Discussed alternative Tx with CRUZ  Feels better  Given Symptoms started on Inderal, now off of it  Discussed side effects including rash and elevated LFT with tapazole  TSH nl, FT4  low, some discordant in th past in level. FT3 is normal, held  tapazole and assess for remission. Off tapazole, last TFT normal  TSH low normal. FT4 has also been low normal with Normal T3. Will evaluate for  central hypothyroidism as reports weight gain and fatigue. She has had normal TSH with low FT4 in the past.Check other pituitary hormones. MRI to evaluate pituitary  Advised to see opthalmology for the pressure in eyes. She had seen in the past, was told does not have MARVA    HTN:    Palpitations: Thyroid level not high, stopped inderal.Given TFT normal and has palpitations, advise heart evaluation    Plan:     1. TSH , FT4, FT3  2. ACTH, Cortisol, IGF-1, FSH , estradiol  3. MRI Brain

## 2023-10-21 DIAGNOSIS — F33.0 MAJOR DEPRESSIVE DISORDER, RECURRENT EPISODE, MILD (HCC): ICD-10-CM

## 2023-10-23 RX ORDER — VENLAFAXINE HYDROCHLORIDE 150 MG/1
150 CAPSULE, EXTENDED RELEASE ORAL DAILY
Qty: 90 CAPSULE | Refills: 3 | Status: SHIPPED | OUTPATIENT
Start: 2023-10-23

## 2023-12-08 DIAGNOSIS — E05.00 GRAVES DISEASE: ICD-10-CM

## 2023-12-08 LAB
T3 SERPL-MCNC: 1.34 NG/ML (ref 0.8–2)
T4 FREE SERPL-MCNC: 1 NG/DL (ref 0.9–1.8)
TSH SERPL DL<=0.005 MIU/L-ACNC: 0.25 UIU/ML (ref 0.27–4.2)

## 2023-12-11 RX ORDER — HYDROCHLOROTHIAZIDE 25 MG/1
TABLET ORAL
Qty: 90 TABLET | Refills: 5 | Status: SHIPPED | OUTPATIENT
Start: 2023-12-11

## 2024-01-10 ENCOUNTER — TELEPHONE (OUTPATIENT)
Dept: INTERNAL MEDICINE CLINIC | Age: 54
End: 2024-01-10

## 2024-01-10 NOTE — TELEPHONE ENCOUNTER
Readings are certainly too high.  If she is asymptomatic, she can wait until her appointment.  If she is not, start amlodipine 2.5 mg daily.

## 2024-01-10 NOTE — TELEPHONE ENCOUNTER
Patient is calling with concerns of HBP and HA.  BP today @ 12:50pm was 141/101 Pulse 104, Last night @ 6:08pm 157/108 Pulse 97, 1/3/24 @ 8:22pm 148/102 Pulse 94, 12/23/23 10:58am 148/97 Pulse 83, 12/22/23 @ 10:21pm 145/96 Pulse 89, 12/22/23 @ 4:02pm 157/105 Pulse 93.     Elevated BP:  BP reading?  See Above  Pulse/ heart rate? See Above  SOB? No  Chest pain? No  Visual changes? No  Headache? Yes  Slurred Speech? No  On BP medication? Yes  BP med taken today? Yes     Patient scheduled for Friday at 11:30am.  Please advise if she should be seen sooner.

## 2024-01-10 NOTE — TELEPHONE ENCOUNTER
The headache is most definitely related to elevated blood pressures.  We will need to address that.

## 2024-01-11 ENCOUNTER — OFFICE VISIT (OUTPATIENT)
Dept: INTERNAL MEDICINE CLINIC | Age: 54
End: 2024-01-11
Payer: COMMERCIAL

## 2024-01-11 VITALS
RESPIRATION RATE: 12 BRPM | WEIGHT: 190 LBS | HEIGHT: 62 IN | SYSTOLIC BLOOD PRESSURE: 124 MMHG | HEART RATE: 90 BPM | BODY MASS INDEX: 34.96 KG/M2 | OXYGEN SATURATION: 94 % | DIASTOLIC BLOOD PRESSURE: 70 MMHG

## 2024-01-11 DIAGNOSIS — I10 BENIGN ESSENTIAL HTN: ICD-10-CM

## 2024-01-11 DIAGNOSIS — E05.00 GRAVES' DISEASE: ICD-10-CM

## 2024-01-11 DIAGNOSIS — I10 BENIGN ESSENTIAL HTN: Primary | ICD-10-CM

## 2024-01-11 DIAGNOSIS — R40.0 DAYTIME SLEEPINESS: ICD-10-CM

## 2024-01-11 DIAGNOSIS — R06.83 SNORING: ICD-10-CM

## 2024-01-11 PROCEDURE — 3078F DIAST BP <80 MM HG: CPT | Performed by: INTERNAL MEDICINE

## 2024-01-11 PROCEDURE — 99213 OFFICE O/P EST LOW 20 MIN: CPT | Performed by: INTERNAL MEDICINE

## 2024-01-11 PROCEDURE — 3074F SYST BP LT 130 MM HG: CPT | Performed by: INTERNAL MEDICINE

## 2024-01-11 RX ORDER — AMLODIPINE BESYLATE 2.5 MG/1
2.5 TABLET ORAL DAILY
Qty: 30 TABLET | Refills: 3 | Status: SHIPPED | OUTPATIENT
Start: 2024-01-11

## 2024-01-11 ASSESSMENT — PATIENT HEALTH QUESTIONNAIRE - PHQ9
SUM OF ALL RESPONSES TO PHQ QUESTIONS 1-9: 13
1. LITTLE INTEREST OR PLEASURE IN DOING THINGS: 3
7. TROUBLE CONCENTRATING ON THINGS, SUCH AS READING THE NEWSPAPER OR WATCHING TELEVISION: 1
6. FEELING BAD ABOUT YOURSELF - OR THAT YOU ARE A FAILURE OR HAVE LET YOURSELF OR YOUR FAMILY DOWN: 1
9. THOUGHTS THAT YOU WOULD BE BETTER OFF DEAD, OR OF HURTING YOURSELF: 0
SUM OF ALL RESPONSES TO PHQ QUESTIONS 1-9: 13
2. FEELING DOWN, DEPRESSED OR HOPELESS: 2
SUM OF ALL RESPONSES TO PHQ QUESTIONS 1-9: 13
SUM OF ALL RESPONSES TO PHQ QUESTIONS 1-9: 13
3. TROUBLE FALLING OR STAYING ASLEEP: 1
SUM OF ALL RESPONSES TO PHQ9 QUESTIONS 1 & 2: 5
5. POOR APPETITE OR OVEREATING: 2
8. MOVING OR SPEAKING SO SLOWLY THAT OTHER PEOPLE COULD HAVE NOTICED. OR THE OPPOSITE, BEING SO FIGETY OR RESTLESS THAT YOU HAVE BEEN MOVING AROUND A LOT MORE THAN USUAL: 0
4. FEELING TIRED OR HAVING LITTLE ENERGY: 3
10. IF YOU CHECKED OFF ANY PROBLEMS, HOW DIFFICULT HAVE THESE PROBLEMS MADE IT FOR YOU TO DO YOUR WORK, TAKE CARE OF THINGS AT HOME, OR GET ALONG WITH OTHER PEOPLE: 1

## 2024-01-11 NOTE — PROGRESS NOTES
lower to suggest that her hyperthyroidism is getting worse.  This could be contributing to her elevated blood pressure as well as a slightly elevated heart rate  - TSH; Future              Discussed medications with patient who voiced understanding of their use, indication and potential side effects.  Pt also understands the above recommendations.   All questions answered.    This note was generated completely or in part utilizing Dragon dictation speech recognition software.  Occasionally, words are mistranscribed and despite editing, the text may contain inaccuracies due to incorrect word recognition.  If further clarification is needed please contact the office at [phone]       Electronically signed    Jb Trejo D.O.

## 2024-01-12 LAB — TSH SERPL DL<=0.005 MIU/L-ACNC: 0.28 UIU/ML (ref 0.27–4.2)

## 2024-02-09 ENCOUNTER — OFFICE VISIT (OUTPATIENT)
Dept: INTERNAL MEDICINE CLINIC | Age: 54
End: 2024-02-09
Payer: COMMERCIAL

## 2024-02-09 VITALS
SYSTOLIC BLOOD PRESSURE: 112 MMHG | WEIGHT: 189.2 LBS | HEART RATE: 96 BPM | OXYGEN SATURATION: 99 % | DIASTOLIC BLOOD PRESSURE: 70 MMHG | BODY MASS INDEX: 34.61 KG/M2

## 2024-02-09 DIAGNOSIS — E66.09 CLASS 1 OBESITY DUE TO EXCESS CALORIES WITH SERIOUS COMORBIDITY AND BODY MASS INDEX (BMI) OF 34.0 TO 34.9 IN ADULT: ICD-10-CM

## 2024-02-09 DIAGNOSIS — E05.00 GRAVES' DISEASE: ICD-10-CM

## 2024-02-09 DIAGNOSIS — I10 BENIGN ESSENTIAL HTN: ICD-10-CM

## 2024-02-09 DIAGNOSIS — E88.810 METABOLIC SYNDROME: ICD-10-CM

## 2024-02-09 DIAGNOSIS — R73.01 IMPAIRED FASTING BLOOD SUGAR: ICD-10-CM

## 2024-02-09 DIAGNOSIS — E78.2 HYPERLIPIDEMIA, MIXED: ICD-10-CM

## 2024-02-09 DIAGNOSIS — Z23 FLU VACCINE NEED: ICD-10-CM

## 2024-02-09 DIAGNOSIS — I10 BENIGN ESSENTIAL HTN: Primary | ICD-10-CM

## 2024-02-09 DIAGNOSIS — F33.0 MAJOR DEPRESSIVE DISORDER, RECURRENT EPISODE, MILD (HCC): ICD-10-CM

## 2024-02-09 LAB
ALBUMIN SERPL-MCNC: 4.5 G/DL (ref 3.4–5)
ALBUMIN/GLOB SERPL: 1.5 {RATIO} (ref 1.1–2.2)
ALP SERPL-CCNC: 65 U/L (ref 40–129)
ALT SERPL-CCNC: 28 U/L (ref 10–40)
ANION GAP SERPL CALCULATED.3IONS-SCNC: 12 MMOL/L (ref 3–16)
AST SERPL-CCNC: 27 U/L (ref 15–37)
BASOPHILS # BLD: 0.1 K/UL (ref 0–0.2)
BASOPHILS NFR BLD: 1.1 %
BILIRUB SERPL-MCNC: 0.5 MG/DL (ref 0–1)
BUN SERPL-MCNC: 15 MG/DL (ref 7–20)
CALCIUM SERPL-MCNC: 9.3 MG/DL (ref 8.3–10.6)
CHLORIDE SERPL-SCNC: 100 MMOL/L (ref 99–110)
CHOLEST SERPL-MCNC: 202 MG/DL (ref 0–199)
CO2 SERPL-SCNC: 28 MMOL/L (ref 21–32)
CREAT SERPL-MCNC: 0.6 MG/DL (ref 0.6–1.1)
DEPRECATED RDW RBC AUTO: 13 % (ref 12.4–15.4)
EOSINOPHIL # BLD: 0.1 K/UL (ref 0–0.6)
EOSINOPHIL NFR BLD: 1.4 %
GFR SERPLBLD CREATININE-BSD FMLA CKD-EPI: >60 ML/MIN/{1.73_M2}
GLUCOSE SERPL-MCNC: 112 MG/DL (ref 70–99)
HCT VFR BLD AUTO: 38.7 % (ref 36–48)
HDLC SERPL-MCNC: 52 MG/DL (ref 40–60)
HGB BLD-MCNC: 13 G/DL (ref 12–16)
LDLC SERPL CALC-MCNC: 117 MG/DL
LYMPHOCYTES # BLD: 2.9 K/UL (ref 1–5.1)
LYMPHOCYTES NFR BLD: 49.8 %
MCH RBC QN AUTO: 30 PG (ref 26–34)
MCHC RBC AUTO-ENTMCNC: 33.7 G/DL (ref 31–36)
MCV RBC AUTO: 89.1 FL (ref 80–100)
MONOCYTES # BLD: 0.4 K/UL (ref 0–1.3)
MONOCYTES NFR BLD: 6.9 %
NEUTROPHILS # BLD: 2.4 K/UL (ref 1.7–7.7)
NEUTROPHILS NFR BLD: 40.8 %
PLATELET # BLD AUTO: 238 K/UL (ref 135–450)
PMV BLD AUTO: 9 FL (ref 5–10.5)
POTASSIUM SERPL-SCNC: 3.8 MMOL/L (ref 3.5–5.1)
PROT SERPL-MCNC: 7.6 G/DL (ref 6.4–8.2)
RBC # BLD AUTO: 4.34 M/UL (ref 4–5.2)
SODIUM SERPL-SCNC: 140 MMOL/L (ref 136–145)
TRIGL SERPL-MCNC: 165 MG/DL (ref 0–150)
VLDLC SERPL CALC-MCNC: 33 MG/DL
WBC # BLD AUTO: 5.8 K/UL (ref 4–11)

## 2024-02-09 PROCEDURE — 3074F SYST BP LT 130 MM HG: CPT | Performed by: INTERNAL MEDICINE

## 2024-02-09 PROCEDURE — 90471 IMMUNIZATION ADMIN: CPT | Performed by: INTERNAL MEDICINE

## 2024-02-09 PROCEDURE — 90674 CCIIV4 VAC NO PRSV 0.5 ML IM: CPT | Performed by: INTERNAL MEDICINE

## 2024-02-09 PROCEDURE — 99214 OFFICE O/P EST MOD 30 MIN: CPT | Performed by: INTERNAL MEDICINE

## 2024-02-09 PROCEDURE — 3078F DIAST BP <80 MM HG: CPT | Performed by: INTERNAL MEDICINE

## 2024-02-09 NOTE — PROGRESS NOTES
[facility] Physicians  Internal Medicine  Patient Encounter  Jb Trejo D.O., [facility]        Chief Complaint   Patient presents with    Follow-up       HPI: 53 y.o. female seen today for her routine checkup regarding status of current issues as below along with a medication review and reconciliation.    Patient has several medical problems including but not limited to hypertension, hyperlipidemia, impaired fasting glucose, depression, Graves' disease.    Patient was recently evaluated for problems with accelerated blood pressures.  We added a low-dose of amlodipine 2.5 mg daily to her usual losartan 100 mg daily and hydrochlorothiazide 25 mg daily.  Home readings:  130's-140's/80's-90's.    We referred for a sleep study.  She has a consult scheduled for 3/12/2024.  She will likely have a home study.      Patient is under the care of of endocrinology for her Graves' disease.  She was seen in December.  We did run a TSH on 1/11/2024 just to be sure the Graves' disease did not exacerbate.  Her TSH was 0.28.  In December it was 0.25.    We reviewed lab from 8/8/2023.  A1c 5.9%  Glucose 104    She denies denies headaches, CP, SOB, dizziness, syncope.  Mood has been good.  She denies polyuria, polydipsia, weight changes.      Also, she has additional complaints of left eye irritation.  She feels like something is scratching.  She did have some crusting of the lashes.  She had been painting.  She was worried something got in her eye.  She irrigated her eye with saline drops.  She did have some allergy drops (Patonol). This has helped.      Past Medical History:   Diagnosis Date    Allergic rhinitis     Caesarean Section     7/10/94    Depression     well controlled    Dysplastic nevus 07/21/2010    Graves' disease 08/08/2023    Hyperlipidemia     Hypertension     Impaired fasting glucose          MEDICATIONS:  Prior to Visit Medications    Medication Sig   amLODIPine (NORVASC) 2.5 MG tablet Take 1 tablet by mouth

## 2024-02-10 LAB
EST. AVERAGE GLUCOSE BLD GHB EST-MCNC: 122.6 MG/DL
HBA1C MFR BLD: 5.9 %

## 2024-03-11 ASSESSMENT — SLEEP AND FATIGUE QUESTIONNAIRES
NECK CIRCUMFERENCE (INCHES): 16
HOW LIKELY ARE YOU TO NOD OFF OR FALL ASLEEP IN A CAR, WHILE STOPPED FOR A FEW MINUTES IN TRAFFIC: 0
HOW LIKELY ARE YOU TO NOD OFF OR FALL ASLEEP IN A CAR, WHILE STOPPED FOR A FEW MINUTES IN TRAFFIC: WOULD NEVER DOZE
HOW LIKELY ARE YOU TO NOD OFF OR FALL ASLEEP WHILE SITTING AND READING: HIGH CHANCE OF DOZING
HOW LIKELY ARE YOU TO NOD OFF OR FALL ASLEEP WHILE SITTING INACTIVE IN A PUBLIC PLACE: SLIGHT CHANCE OF DOZING
HOW LIKELY ARE YOU TO NOD OFF OR FALL ASLEEP WHILE SITTING AND TALKING TO SOMEONE: 0
HOW LIKELY ARE YOU TO NOD OFF OR FALL ASLEEP WHILE LYING DOWN TO REST IN THE AFTERNOON WHEN CIRCUMSTANCES PERMIT: SLIGHT CHANCE OF DOZING
HOW LIKELY ARE YOU TO NOD OFF OR FALL ASLEEP WHEN YOU ARE A PASSENGER IN A CAR FOR AN HOUR WITHOUT A BREAK: WOULD NEVER DOZE
HOW LIKELY ARE YOU TO NOD OFF OR FALL ASLEEP WHILE LYING DOWN TO REST IN THE AFTERNOON WHEN CIRCUMSTANCES PERMIT: 1
ESS TOTAL SCORE: 6
HOW LIKELY ARE YOU TO NOD OFF OR FALL ASLEEP WHEN YOU ARE A PASSENGER IN A CAR FOR AN HOUR WITHOUT A BREAK: 0
HOW LIKELY ARE YOU TO NOD OFF OR FALL ASLEEP WHILE WATCHING TV: 1
HOW LIKELY ARE YOU TO NOD OFF OR FALL ASLEEP WHILE WATCHING TV: SLIGHT CHANCE OF DOZING
HOW LIKELY ARE YOU TO NOD OFF OR FALL ASLEEP WHILE SITTING QUIETLY AFTER LUNCH WITHOUT ALCOHOL: 0
HOW LIKELY ARE YOU TO NOD OFF OR FALL ASLEEP WHILE SITTING QUIETLY AFTER LUNCH WITHOUT ALCOHOL: WOULD NEVER DOZE
HOW LIKELY ARE YOU TO NOD OFF OR FALL ASLEEP WHILE SITTING AND READING: 3
HOW LIKELY ARE YOU TO NOD OFF OR FALL ASLEEP WHILE SITTING AND TALKING TO SOMEONE: WOULD NEVER DOZE
HOW LIKELY ARE YOU TO NOD OFF OR FALL ASLEEP WHILE SITTING INACTIVE IN A PUBLIC PLACE: 1

## 2024-03-12 ENCOUNTER — OFFICE VISIT (OUTPATIENT)
Dept: PULMONOLOGY | Age: 54
End: 2024-03-12
Payer: COMMERCIAL

## 2024-03-12 VITALS
DIASTOLIC BLOOD PRESSURE: 81 MMHG | HEIGHT: 61 IN | OXYGEN SATURATION: 98 % | HEART RATE: 86 BPM | SYSTOLIC BLOOD PRESSURE: 118 MMHG | WEIGHT: 187 LBS | BODY MASS INDEX: 35.3 KG/M2

## 2024-03-12 DIAGNOSIS — E66.01 SEVERE OBESITY (BMI 35.0-39.9) WITH COMORBIDITY (HCC): ICD-10-CM

## 2024-03-12 DIAGNOSIS — R06.83 SNORING: ICD-10-CM

## 2024-03-12 DIAGNOSIS — R06.81 WITNESSED EPISODE OF APNEA: ICD-10-CM

## 2024-03-12 DIAGNOSIS — R53.83 FATIGUE, UNSPECIFIED TYPE: ICD-10-CM

## 2024-03-12 DIAGNOSIS — I10 BENIGN ESSENTIAL HTN: ICD-10-CM

## 2024-03-12 DIAGNOSIS — G47.10 HYPERSOMNIA: Primary | ICD-10-CM

## 2024-03-12 PROCEDURE — 3074F SYST BP LT 130 MM HG: CPT | Performed by: STUDENT IN AN ORGANIZED HEALTH CARE EDUCATION/TRAINING PROGRAM

## 2024-03-12 PROCEDURE — 3079F DIAST BP 80-89 MM HG: CPT | Performed by: STUDENT IN AN ORGANIZED HEALTH CARE EDUCATION/TRAINING PROGRAM

## 2024-03-12 PROCEDURE — 99204 OFFICE O/P NEW MOD 45 MIN: CPT | Performed by: STUDENT IN AN ORGANIZED HEALTH CARE EDUCATION/TRAINING PROGRAM

## 2024-03-12 NOTE — PATIENT INSTRUCTIONS
Patient information on the evaluation procedure for sleep apnea:    1. You are going to have a portable sleep study:  This is a home sleep study that will be done to see if you have obstructive sleep apnea. You will have a flow monitor on your nose, belt on your chest and a oxygen/heart rate monitor on your finger. Please do not wear nail polish on day of the study as it will interfere with the oxygen reading probe that is placed on your finger during the study.   Once you receive the device, you will also receive instructions on how to put it on and turn it on.  After 2 nights you will return it.    2. The sleep office will call you with the results a week after the study.     If the study showed that you have obstructive sleep apnea you will be told of the next step in your care. Commonly the recommended treatment is CPAP Therapy. If you agree to this therapy and you receive your CPAP before your next appointment, please bring it with you to your first follow-up appointment.      Patients who have obstructive sleep apnea on the sleep study and have chosen to try CPAP:  A home equipment company will contact you to set you up with a CPAP machine and fit you for a mask.    Please bring your CPAP, the mask and all supplies including the electrical cord with you to all your first follow up appointments with the sleep physician    Please keep trying to use your CPAP until you have seen in the sleep office in follow up as a lot of the problems patients have with CPAP can be addressed and corrected by your sleep provider.    Sleep center contact information:  [facility]: [phone]  [facility]: [phone]             What are the risk factors for Obstructive Sleep Apnea (SAVANAH)?  Obesity  Snoring  Daytime sleepiness  Increasing age  Male gender  Taking sedating medications  Alcohol use  Hypertension  Stroke  Diabetes  Smoking     What is SAVANAH?  People with SAVANAH experience recurrent

## 2024-03-12 NOTE — PROGRESS NOTES
symptoms for sleep disordered breathing including snoring, daytime sleepiness and fatigue, obesity, hypertension, large neck size. I will order a home sleep test to further evaluate this.    We discussed treatment options and she is willing to consider PAP therapy.  If the study is positive for obstructive sleep apnea, we will therefore proceed with auto CPAP unless in lab PAP titration is indicated based on the sleep study.   She understands that untreated SAVANAH is associated with heart failure, atrial fibrillation, stroke, HTN, Alzheimer's and impaired glucose tolerance.    She should avoid respiratory suppressants as these can worsen sleep disordered breathing.    She should never drive if drowsy and should pull over at a safe place if she becomes drowsy while driving. A handout on drowsy driving tips was given to the patient.    Hypertension: controlled. Continue present management.     Overweight/obesity BMI: Weight loss is associated with improvement in sleep disordered breathing. Arelis Smith should exercise regularly and watch her diet.    No follow-ups on file. Will schedule patient for follow-up after sleep test.    Ariel Tovar MD  Sleep Medicine  3:11 PM    This dictation was generated by voice recognition computer software.  Although all attempts are made to edit the dictation for accuracy, there may be errors in the transcription that are not intended.

## 2024-03-22 ENCOUNTER — OFFICE VISIT (OUTPATIENT)
Dept: INTERNAL MEDICINE CLINIC | Age: 54
End: 2024-03-22
Payer: COMMERCIAL

## 2024-03-22 VITALS
WEIGHT: 188 LBS | OXYGEN SATURATION: 98 % | BODY MASS INDEX: 35.52 KG/M2 | TEMPERATURE: 98.8 F | SYSTOLIC BLOOD PRESSURE: 118 MMHG | DIASTOLIC BLOOD PRESSURE: 72 MMHG | HEART RATE: 92 BPM

## 2024-03-22 DIAGNOSIS — M75.41 ROTATOR CUFF IMPINGEMENT SYNDROME OF RIGHT SHOULDER: Primary | ICD-10-CM

## 2024-03-22 PROCEDURE — 99213 OFFICE O/P EST LOW 20 MIN: CPT | Performed by: INTERNAL MEDICINE

## 2024-03-22 PROCEDURE — 3078F DIAST BP <80 MM HG: CPT | Performed by: INTERNAL MEDICINE

## 2024-03-22 PROCEDURE — 3074F SYST BP LT 130 MM HG: CPT | Performed by: INTERNAL MEDICINE

## 2024-03-22 RX ORDER — NAPROXEN 500 MG/1
500 TABLET ORAL 2 TIMES DAILY WITH MEALS
Qty: 60 TABLET | Refills: 0 | Status: SHIPPED | OUTPATIENT
Start: 2024-03-22

## 2024-03-22 RX ORDER — IBUPROFEN 200 MG
200 TABLET ORAL EVERY 6 HOURS PRN
COMMUNITY
End: 2024-03-22 | Stop reason: ALTCHOICE

## 2024-03-22 NOTE — PROGRESS NOTES
[facility]  Internal Medicine  Patient Encounter  Jb Trejo D.O., [facility]        Chief Complaint   Patient presents with    Shoulder Pain     Patient presents to clinic c/o intermittent R shoulder pain x 1 year. Worsened w/ mvmt.& driving- relieved with NASIDs. Admits to walking dtr's Frisian Richa & dog took off & pulled her so hard she had bad fall approx. 1 year ago.         HPI: 53 y.o. female seen urgently today with complaint of right shoulder pain.  Patient states symptoms have been off and on over the last year.  She developed right shoulder pain about a year ago when she was walking a dog.  The dog took off and pulled her and caused her to fall forward with her arms stretched out in front of her.  She had pain immediately.  Since then she has had intermittent right shoulder pain.  She describes throbbing at times while other times she can feel a sharp jabbing pain.  Pain is exacerbated depending on her level of activity.  Patient is generally able to do activity but then feels pain afterwards.  She has used over-the-counter naproxen which has helped.  She is able to raise her arm above her head and reach backwards.  She has more pain reaching forwards.  She denies any neck pain or numbness or tingling.  She does feel some popping at times    Past Medical History:   Diagnosis Date    Allergic rhinitis     Caesarean Section     7/10/94    Depression     well controlled    Dysplastic nevus 07/21/2010    Graves' disease 08/08/2023    Hyperlipidemia     Hypertension     Impaired fasting glucose          MEDICATIONS:  Prior to Visit Medications    Medication Sig   ibuprofen (ADVIL;MOTRIN) 200 MG tablet Take 1 tablet by mouth every 6 hours as needed for Pain   amLODIPine (NORVASC) 2.5 MG tablet Take 1 tablet by mouth daily   hydroCHLOROthiazide (HYDRODIURIL) 25 MG tablet TAKE 1 TABLET DAILY   venlafaxine (EFFEXOR XR) 150 MG extended release capsule Take 1 capsule by mouth daily   losartan

## 2024-03-25 ENCOUNTER — HOSPITAL ENCOUNTER (OUTPATIENT)
Dept: SLEEP CENTER | Age: 54
Discharge: HOME | End: 2024-03-25
Payer: COMMERCIAL

## 2024-03-25 DIAGNOSIS — G47.10 HYPERSOMNIA: ICD-10-CM

## 2024-03-25 DIAGNOSIS — R06.83 SNORING: ICD-10-CM

## 2024-03-25 DIAGNOSIS — E66.01 SEVERE OBESITY (BMI 35.0-39.9) WITH COMORBIDITY (HCC): ICD-10-CM

## 2024-03-25 DIAGNOSIS — I10 BENIGN ESSENTIAL HTN: ICD-10-CM

## 2024-03-25 DIAGNOSIS — R53.83 FATIGUE, UNSPECIFIED TYPE: ICD-10-CM

## 2024-03-25 DIAGNOSIS — R06.81 WITNESSED EPISODE OF APNEA: ICD-10-CM

## 2024-03-25 PROCEDURE — 95806 SLEEP STUDY UNATT&RESP EFFT: CPT

## 2024-03-27 ENCOUNTER — TELEPHONE (OUTPATIENT)
Dept: PULMONOLOGY | Age: 54
End: 2024-03-27

## 2024-03-27 NOTE — PROGRESS NOTES
prescribed supplies are medically necessary for this patient’s wellbeing.  In my opinion, the supplies are both reasonable and necessary in reference to accepted standards of medicalpractice in treatment of this patient’s condition.    Ariel Tovar MD    NPI: [id number]       Order Signed Date: 03/27/24      Arelis Bassett  1970  [address] [address]  [phone] (home) [phone] (work)  [phone] (mobile)    Insurance:   Active Insurance as of 3/27/2024       Primary Coverage       Payor Plan Insurance Group Employer/Plan Group    [employer] [place] [id number]       Payor Plan Address Payor Plan Phone Number Payor Plan Fax Number Effective Dates    [address] [phone]  1/1/2019 - None Entered    [facility] [address]         Subscriber Name Subscriber Birth Date Member ID       ARIEL BASSETT 1970 [id number]                      Electronically signed by Ariel Tovar MD on 3/27/2024 at 2:41 PM.    --------------------------------------------------------------------------        Diagnosis: [x] SAVANAH  (G47.33) [] CSA (G47.31) []  Other:__________________   Length of Need: [] 13 months [x]  99 Months                                          []  Other:__________________   Machine (SUE): [] Respironics Auto (with modem for remote monitoring)       [] Other:____________________    [x]  ResMed Auto (with modem for remote monitoring)    [x]  CPAP () [] Bilevel ()   Mode: Mode:   [x] Auto [] Fixed [] Auto [] Spontaneous   Pmin:_____5____cmH2O      Pmax:_____15____cmH2O   P:_________cmH2O    EPAPmin:__________cmH2O IPAP:__________cmH2O     IPAPmax:__________cmH2O EPAP:__________cmH2O     PSmin:_______  PSmax:_______       (ResMed) PS:_________     Flex/EPR - 3 full time                          Ramp time: 30 min Flex/EPR - 3 full time                 Ramp time: 30 min   Ramp Pressure:_____4______cmH2O Ramp Pressure:____________cmH2O         Humidifier: [x]

## 2024-03-27 NOTE — TELEPHONE ENCOUNTER
Please inform patient that her sleep study showed moderate sleep apnea and that she stopped breathing or her breathing was inadequate about 26 times for every hour of sleep. Her blood oxygen also dropped as low as 84% during the night.     If she agrees I will order a CPAP via a durable medical equipment company of their choice (if no preference, we will go with [employer] ([place])? Patient lives in [place]) and they will reach out to keep her updated on the process. This will be the company that is going to work with her insurance and provide the CPAP device, along with replacing the mask and other supplies going forward. If they have any questions, they can let you know or message me via [employer]. If patient agrees with plan, please route the PAP order to DME company (order already completed on a separate order encounter). Patient should be scheduled for 31 to 90 days follow up.    Thanks  Ariel Tovar MD

## 2024-03-28 ENCOUNTER — TELEPHONE (OUTPATIENT)
Dept: PULMONOLOGY | Age: 54
End: 2024-03-28

## 2024-03-28 NOTE — TELEPHONE ENCOUNTER
Spoke with pt to review sleep study results.  Order to be sent to [facility]. Pt was driving so DME information was sent on My Chart. Pt to schedule f/u.

## 2024-04-01 PROBLEM — G47.10 HYPERSOMNIA: Status: ACTIVE | Noted: 2024-04-01

## 2024-04-01 NOTE — TELEPHONE ENCOUNTER
Study scanned in chart.  Patient called per 03/28/2024 telephone encounter.  Order routed to [facility].

## 2024-04-05 DIAGNOSIS — M25.511 RIGHT SHOULDER PAIN, UNSPECIFIED CHRONICITY: Primary | ICD-10-CM

## 2024-04-08 SDOH — HEALTH STABILITY: PHYSICAL HEALTH: ON AVERAGE, HOW MANY DAYS PER WEEK DO YOU ENGAGE IN MODERATE TO STRENUOUS EXERCISE (LIKE A BRISK WALK)?: 2 DAYS

## 2024-04-08 SDOH — HEALTH STABILITY: PHYSICAL HEALTH: ON AVERAGE, HOW MANY MINUTES DO YOU ENGAGE IN EXERCISE AT THIS LEVEL?: 20 MIN

## 2024-04-10 ENCOUNTER — OFFICE VISIT (OUTPATIENT)
Age: 54
End: 2024-04-10
Payer: COMMERCIAL

## 2024-04-10 ENCOUNTER — HOSPITAL ENCOUNTER (OUTPATIENT)
Age: 54
Discharge: HOME | End: 2024-04-10
Payer: COMMERCIAL

## 2024-04-10 VITALS — HEIGHT: 61 IN | BODY MASS INDEX: 35.5 KG/M2 | WEIGHT: 188 LBS

## 2024-04-10 DIAGNOSIS — M25.511 RIGHT SHOULDER PAIN, UNSPECIFIED CHRONICITY: ICD-10-CM

## 2024-04-10 DIAGNOSIS — S46.011A TRAUMATIC TEAR OF RIGHT ROTATOR CUFF, UNSPECIFIED TEAR EXTENT, INITIAL ENCOUNTER: Primary | ICD-10-CM

## 2024-04-10 PROCEDURE — 99203 OFFICE O/P NEW LOW 30 MIN: CPT | Performed by: ORTHOPAEDIC SURGERY

## 2024-04-10 PROCEDURE — 73030 X-RAY EXAM OF SHOULDER: CPT

## 2024-04-10 NOTE — PROGRESS NOTES
[address], [place] [address]  Phone: [phone]     Please give them a call to set up your appointment. Once finished please ask them to either give you a copy of your report and disk or have them fax the report to [phone] and push the images into [employer] PAC's system.     If you have any questions please let us know!     Standing Status:   Future     Standing Expiration Date:   4/10/2025     Order Specific Question:   What is the sedation requirement?     Answer:   None       Treatment Plan:  We discussed treatment options.  At this point she is having considerable issues with the shoulder likely related to traumatic rotator cuff tear.  Explained to her the difference in treatment of traumatic rotator cuff tears versus degenerative tears. We will go ahead and obtain an MRI as injection at this point is unlikely to yield satisfactory benefit and if she does have a full-thickness tear repair would be in her best interest versus continued delayed treatment.  I explained to this to her.  She will continue to modify her activities as needed and once we have the MRI and she will return for results to discuss treatment options.  She may also have underlying injury to the labral anchor of her long head of the bicep in addition to other findings such as inflammatory bursitis.  We can discuss these in full detail once we have the MRI results.      She understands and accepts this course of care.    Documentation was done using voice recognition dragon software.  Every effort was made to ensure accuracy; however, inadvertent  Unintentional computerized transcription errors may be present.

## 2024-04-16 ENCOUNTER — TELEPHONE (OUTPATIENT)
Dept: ORTHOPEDIC SURGERY | Age: 54
End: 2024-04-16

## 2024-04-16 NOTE — TELEPHONE ENCOUNTER
General Question     Subject: FAX MRI ORDER  Patient and /or Facility Request: Arelis Smith   Contact Number: [phone]       SAMINA STILL HASN'T RECEIVED THE ORDER FOR THE MRI OF HER RT SHOULDER.  [facility] IN [place]

## 2024-04-22 RX ORDER — PRAVASTATIN SODIUM 20 MG
20 TABLET ORAL DAILY
Qty: 90 TABLET | Refills: 3 | Status: SHIPPED | OUTPATIENT
Start: 2024-04-22 | End: 2024-04-23 | Stop reason: SDUPTHER

## 2024-04-22 RX ORDER — POTASSIUM CHLORIDE 20 MEQ/1
TABLET, EXTENDED RELEASE ORAL
Qty: 180 TABLET | Refills: 3 | Status: SHIPPED | OUTPATIENT
Start: 2024-04-22 | End: 2024-04-23 | Stop reason: SDUPTHER

## 2024-04-23 ENCOUNTER — TELEPHONE (OUTPATIENT)
Dept: INTERNAL MEDICINE CLINIC | Age: 54
End: 2024-04-23

## 2024-04-23 RX ORDER — PRAVASTATIN SODIUM 20 MG
20 TABLET ORAL DAILY
Qty: 90 TABLET | Refills: 3 | Status: SHIPPED | OUTPATIENT
Start: 2024-04-23

## 2024-04-23 RX ORDER — POTASSIUM CHLORIDE 20 MEQ/1
TABLET, EXTENDED RELEASE ORAL
Qty: 180 TABLET | Refills: 3 | Status: SHIPPED | OUTPATIENT
Start: 2024-04-23

## 2024-04-23 NOTE — TELEPHONE ENCOUNTER
Patient is calling in for  in regards to medication refill request for:    potassium chloride (KLOR-CON M20) 20 MEQ extended release tablet      And     pravastatin (PRAVACHOL) 20 MG tablet     Per patient wanted medication to go to [facility] rx mail service and not the local pharmacy. Please re route medication refill to :    [facility] - [place], [place] - [address] [facility] - P [phone] - F [phone] [address] [facility] [address], [facility] [address]  Phone: [phone]  Fax: [phone]

## 2024-04-26 ENCOUNTER — TELEPHONE (OUTPATIENT)
Age: 54
End: 2024-04-26

## 2024-04-26 DIAGNOSIS — S46.011A TRAUMATIC TEAR OF RIGHT ROTATOR CUFF, UNSPECIFIED TEAR EXTENT, INITIAL ENCOUNTER: ICD-10-CM

## 2024-04-29 DIAGNOSIS — E05.90 HYPERTHYROIDISM: ICD-10-CM

## 2024-04-29 LAB
T3 SERPL-MCNC: 1.46 NG/ML (ref 0.8–2)
T3FREE SERPL-MCNC: 3.5 PG/ML (ref 2.3–4.2)
T4 FREE SERPL-MCNC: 1 NG/DL (ref 0.9–1.8)
TSH SERPL DL<=0.005 MIU/L-ACNC: 0.08 UIU/ML (ref 0.27–4.2)

## 2024-05-01 ENCOUNTER — OFFICE VISIT (OUTPATIENT)
Dept: ENDOCRINOLOGY | Age: 54
End: 2024-05-01
Payer: COMMERCIAL

## 2024-05-01 VITALS
DIASTOLIC BLOOD PRESSURE: 81 MMHG | SYSTOLIC BLOOD PRESSURE: 125 MMHG | WEIGHT: 191 LBS | HEIGHT: 61 IN | RESPIRATION RATE: 14 BRPM | OXYGEN SATURATION: 98 % | HEART RATE: 80 BPM | BODY MASS INDEX: 36.06 KG/M2

## 2024-05-01 DIAGNOSIS — E05.90 SUBCLINICAL HYPERTHYROIDISM: Primary | ICD-10-CM

## 2024-05-01 DIAGNOSIS — I10 BENIGN ESSENTIAL HTN: ICD-10-CM

## 2024-05-01 LAB
IGF-I SERPL-MCNC: 167 NG/ML (ref 53–234)
IGF-I Z-SCORE SERPL: 0.9

## 2024-05-01 PROCEDURE — 3079F DIAST BP 80-89 MM HG: CPT | Performed by: INTERNAL MEDICINE

## 2024-05-01 PROCEDURE — 3074F SYST BP LT 130 MM HG: CPT | Performed by: INTERNAL MEDICINE

## 2024-05-01 PROCEDURE — 99212 OFFICE O/P EST SF 10 MIN: CPT | Performed by: INTERNAL MEDICINE

## 2024-05-01 RX ORDER — AMLODIPINE BESYLATE 2.5 MG/1
2.5 TABLET ORAL DAILY
Qty: 30 TABLET | Refills: 3 | Status: SHIPPED | OUTPATIENT
Start: 2024-05-01

## 2024-05-01 NOTE — PROGRESS NOTES
Subjective:      49 y/o WF who is here for thyroid evaluation.     Referred by Dr. Jb Trejo    Interim:     Fatigue  Weight gain    Mom Dx with colon cancer  Passed away 12/22    occasional palpitations  Improved with tapazole  Lost weight intentionally    Reports some blurred vision  Did not see  Dr. Dubon    She was diagnosed with hyperthyroidism    Had work up done    No neck pain, fever chills    11/20 TSH 0.007 FT3 4.97  FT4 1.28  TSI 1.57  10/20 TSH 0.09  10/19 TSH 1.35    12/20 Thyroid scan:     FINDINGS:    THYROID APPEARANCE:  Normal size and configuration with normal homogenous uptake   4 HOUR THYROID IODINE UPTAKE:  12.9% ( normal is 6-18%)   24 HOUR THYROID IODINE UPTAKE:  33.2% (normal is 10-35%)     Normal USG    Initial complaints: headache, palpitations, reports fast heart rate , memory fog, tremors, weight loss 14 lb, heat intolerance    Symptoms for a few weeks  Palpitations for months  No eye symptoms  History of obstructive symptoms:  difficulty swallowing no    History of radiation to patient's neck: no  Recent iodine exposure: no  Family history includes Mom Hashimoto's hypothyroidism/ maternal aunt  Family history of thyroid cancer: no    TSH <0.01 FT4 1.8 FT3 7.8  Trab -ve  2/21  TSH <0.01 T3 1.72 FT3 4.2 Tapazole 10mg    4/21  TSH 0.2 FT4 0.8  Tapazole 10mg    5/21 TSH 3.21 FT4 0.6 FT3 2.5   8/21 TSH 9.63 FT4 0.7  On 7.5mg  12/21 TSH 1.98 FT4 0.8   On 2.5mg  4/22 TSH 0.21 FT4 0.9 FT3  3.8  Off tapazole  8/22 TSH 0.05 FT4 0.8 FT3 2.9  tapazole 5mg  8/22 TSH 2.36 FT4 0.7  FT3 2.7  2/23 TSH 1.28 FT4 0.7 FT3 2.5  on tapazole 2.5mg  6/23 TSH 0.35 FT4 0.9 FT3 3.3  off tapazole  10/23 TSH 0.28   1/24 TSH 0.28  4/24 TSH 0.08      She has HTN, takes losartan and HCTZ    She has a h/o hysterectomy    Past Medical History:   Diagnosis Date    Allergic rhinitis     Caesarean Section     7/10/94    Depression     well controlled    Dysplastic nevus 07/21/2010    Graves' disease 08/08/2023

## 2024-05-07 ENCOUNTER — TELEPHONE (OUTPATIENT)
Age: 54
End: 2024-05-07

## 2024-05-07 NOTE — TELEPHONE ENCOUNTER
Called [facility] to get images pushed, Was disconnected 2x. Left voicemail the third time to have them push images of Right Shoulder MRI.

## 2024-05-09 ENCOUNTER — OFFICE VISIT (OUTPATIENT)
Age: 54
End: 2024-05-09
Payer: COMMERCIAL

## 2024-05-09 ENCOUNTER — PREP FOR PROCEDURE (OUTPATIENT)
Age: 54
End: 2024-05-09

## 2024-05-09 VITALS — WEIGHT: 191 LBS | BODY MASS INDEX: 36.06 KG/M2 | HEIGHT: 61 IN

## 2024-05-09 DIAGNOSIS — M19.011 ARTHRITIS OF RIGHT ACROMIOCLAVICULAR JOINT: ICD-10-CM

## 2024-05-09 DIAGNOSIS — S46.011D TRAUMATIC COMPLETE TEAR OF RIGHT ROTATOR CUFF, SUBSEQUENT ENCOUNTER: Primary | ICD-10-CM

## 2024-05-09 DIAGNOSIS — Z01.818 PRE-OP TESTING: Primary | ICD-10-CM

## 2024-05-09 PROBLEM — S46.011A TRAUMATIC COMPLETE TEAR OF RIGHT ROTATOR CUFF: Status: ACTIVE | Noted: 2024-05-09

## 2024-05-09 PROCEDURE — L3670 SO ACRO/CLAV CAN WEB PRE OTS: HCPCS | Performed by: ORTHOPAEDIC SURGERY

## 2024-05-09 PROCEDURE — 99213 OFFICE O/P EST LOW 20 MIN: CPT | Performed by: ORTHOPAEDIC SURGERY

## 2024-05-09 RX ORDER — SODIUM CHLORIDE 0.9 % (FLUSH) 0.9 %
5-40 SYRINGE (ML) INJECTION PRN
Status: CANCELLED | OUTPATIENT
Start: 2024-05-09

## 2024-05-09 RX ORDER — MELOXICAM 7.5 MG/1
7.5 TABLET ORAL ONCE
Status: CANCELLED | OUTPATIENT
Start: 2024-05-09 | End: 2024-05-09

## 2024-05-09 RX ORDER — SODIUM CHLORIDE 0.9 % (FLUSH) 0.9 %
5-40 SYRINGE (ML) INJECTION EVERY 12 HOURS SCHEDULED
Status: CANCELLED | OUTPATIENT
Start: 2024-05-09

## 2024-05-09 RX ORDER — TRANEXAMIC ACID 650 MG/1
1950 TABLET ORAL
Status: CANCELLED | OUTPATIENT
Start: 2024-05-09

## 2024-05-09 RX ORDER — SODIUM CHLORIDE 9 MG/ML
INJECTION, SOLUTION INTRAVENOUS PRN
Status: CANCELLED | OUTPATIENT
Start: 2024-05-09

## 2024-05-09 RX ORDER — SODIUM CHLORIDE 9 MG/ML
INJECTION, SOLUTION INTRAVENOUS CONTINUOUS
Status: CANCELLED | OUTPATIENT
Start: 2024-05-09

## 2024-05-09 RX ORDER — ACETAMINOPHEN 325 MG/1
1000 TABLET ORAL ONCE
Status: CANCELLED | OUTPATIENT
Start: 2024-05-09 | End: 2024-05-09

## 2024-05-09 NOTE — PROGRESS NOTES
Arelis Smith  [id number]  Encounter Date: 5/9/2024  YOB: 1970    Chief Complaint   Patient presents with    Follow-up     MRI results        History:Ms. Arelis Smith is here in follow up regarding her right shoulder pain.  Pain is still moderate to severe and worse with activity.  No new injuries since her last visit.  She is here to review her MRI results and discuss surgical options.    Exam:  Ht 1.549 m (5' 1\")   Wt 86.6 kg (191 lb)   LMP 05/06/2011   BMI 36.09 kg/m²   General: Alert and oriented x3, No acute distress, Cooperative and conversant.  Obesity Present  Mood and affect are appropriate.  Right Shoulder Examination:     Inspection:  No ptosis and normal deltoid contour.  No abrasions or erythema.      Palpation: Tenderness over the subacromial space.  Moderate tenderness at the AC joint.     Range of Motion:               Forward Flexion: 150              Abduction: 140              Internal Rotation: L3              External Rotation with elbow at side: 60     Strength:  Forward flexion: 5 / 5                   Abduction: 4+ / 5                   Internal Rotation: 5 / 5                   External Rotation with elbow at side: 5_ / 5        Special Tests:       Shoulder shrug strength is 5 over 5 equal bilaterally.     Capillary refill is brisk.    Pain with cross arm adduction   Neer's impingement positive  Palomares's maneuver positive  Empty can testing positive  Drop arm sign negative    Negative sulcus sign.    No signs of any significant multidirectional instability.    There is no scapular winging.    There is no muscle atrophy of the latissimus dorsi, the deltoid, the periscapular musculature,The trapezius musculature or the pectoralis musculature.     Skin: There are no rashes, ulcerations or lesions.     Elbow and wrist range of motion full with strength 5 out of 5 on elbow flexion and extension, 5 out of 5 on wrist flexion and extension.   strength is 5 out of 5.

## 2024-05-10 ENCOUNTER — TELEPHONE (OUTPATIENT)
Dept: ORTHOPEDIC SURGERY | Age: 54
End: 2024-05-10

## 2024-05-10 NOTE — TELEPHONE ENCOUNTER
PA requsted via [employer] [facility]  Reference # [id number]

## 2024-05-13 ENCOUNTER — TELEPHONE (OUTPATIENT)
Dept: ORTHOPEDIC SURGERY | Age: 54
End: 2024-05-13

## 2024-05-13 DIAGNOSIS — I10 BENIGN ESSENTIAL HTN: ICD-10-CM

## 2024-05-13 DIAGNOSIS — E78.2 HYPERLIPIDEMIA, MIXED: Primary | ICD-10-CM

## 2024-05-13 RX ORDER — PRAVASTATIN SODIUM 20 MG
20 TABLET ORAL DAILY
Qty: 90 TABLET | Refills: 3 | Status: SHIPPED | OUTPATIENT
Start: 2024-05-13

## 2024-05-13 RX ORDER — POTASSIUM CHLORIDE 20 MEQ/1
TABLET, EXTENDED RELEASE ORAL
Qty: 180 TABLET | Refills: 3 | Status: SHIPPED | OUTPATIENT
Start: 2024-05-13

## 2024-05-13 NOTE — TELEPHONE ENCOUNTER
Wants sent to mail in pharmacy     Refill Request     Last Seen: 3/22/2024        Next Appointment:   Future Appointments   Date Time Provider Department Center   5/24/2024  8:30 AM Jb Trejo DO [facility] [place] [facility]   6/10/2024  9:15 AM Greg Camacho MD  [facility]   6/10/2024 10:40 AM Ariel Tovar MD  SLEEP [facility]   8/15/2024  9:00 AM Jb Trejo DO [facility] [place] [facility]   11/4/2024  9:50 AM Suman Roldan MD [facility]             Requested Prescriptions      No prescriptions requested or ordered in this encounter

## 2024-05-13 NOTE — TELEPHONE ENCOUNTER
Patient is calling in for  in regards to medication refill for:    pravastatin (PRAVACHOL) 20 MG tablet   Last appointment: 3/22/2024  Next appointment: 5/24/2024  Last refill: 4/23/24      potassium chloride (KLOR-CON M20) 20 MEQ extended release tablet   Last appointment: 3/22/2024  Next appointment: 5/24/2024  Last refill: 4/23/24      Per patient medication refill's were supposed to go to the mail order pharmacy and not the local pharmacy, please cancel request to [facility] and send refills to :    [facility] [address] [phone] - F [phone]

## 2024-05-13 NOTE — TELEPHONE ENCOUNTER
Called patient GIOVANNI for patient statluca hat she does not need a Pre-Op with Dr. Camacho however she does need a history and physical done by her Primary Care - If she has any questions she can give us a call at [phone]

## 2024-05-14 ENCOUNTER — OFFICE VISIT (OUTPATIENT)
Dept: INTERNAL MEDICINE CLINIC | Age: 54
End: 2024-05-14
Payer: COMMERCIAL

## 2024-05-14 VITALS
OXYGEN SATURATION: 97 % | DIASTOLIC BLOOD PRESSURE: 76 MMHG | WEIGHT: 191 LBS | BODY MASS INDEX: 36.09 KG/M2 | HEART RATE: 85 BPM | SYSTOLIC BLOOD PRESSURE: 110 MMHG

## 2024-05-14 DIAGNOSIS — Z01.818 PREOP EXAM FOR INTERNAL MEDICINE: Primary | ICD-10-CM

## 2024-05-14 DIAGNOSIS — G47.33 OSA ON CPAP: ICD-10-CM

## 2024-05-14 DIAGNOSIS — M67.813 BICEPS TENDINOSIS OF RIGHT SHOULDER: ICD-10-CM

## 2024-05-14 DIAGNOSIS — E05.00 GRAVES' DISEASE: ICD-10-CM

## 2024-05-14 DIAGNOSIS — I10 BENIGN ESSENTIAL HTN: ICD-10-CM

## 2024-05-14 DIAGNOSIS — Z01.818 PRE-OP TESTING: ICD-10-CM

## 2024-05-14 DIAGNOSIS — E88.810 METABOLIC SYNDROME: ICD-10-CM

## 2024-05-14 DIAGNOSIS — S46.011A TRAUMATIC TEAR OF RIGHT ROTATOR CUFF, UNSPECIFIED TEAR EXTENT, INITIAL ENCOUNTER: ICD-10-CM

## 2024-05-14 DIAGNOSIS — R73.01 IMPAIRED FASTING BLOOD SUGAR: ICD-10-CM

## 2024-05-14 LAB
ANION GAP SERPL CALCULATED.3IONS-SCNC: 13 MMOL/L (ref 3–16)
BASOPHILS # BLD: 0.1 K/UL (ref 0–0.2)
BASOPHILS NFR BLD: 0.9 %
BUN SERPL-MCNC: 12 MG/DL (ref 7–20)
CALCIUM SERPL-MCNC: 10 MG/DL (ref 8.3–10.6)
CHLORIDE SERPL-SCNC: 101 MMOL/L (ref 99–110)
CO2 SERPL-SCNC: 28 MMOL/L (ref 21–32)
CREAT SERPL-MCNC: 0.6 MG/DL (ref 0.6–1.1)
DEPRECATED RDW RBC AUTO: 12.6 % (ref 12.4–15.4)
EOSINOPHIL # BLD: 0.1 K/UL (ref 0–0.6)
EOSINOPHIL NFR BLD: 1.6 %
GFR SERPLBLD CREATININE-BSD FMLA CKD-EPI: >90 ML/MIN/{1.73_M2}
GLUCOSE SERPL-MCNC: 105 MG/DL (ref 70–99)
HCT VFR BLD AUTO: 37.7 % (ref 36–48)
HGB BLD-MCNC: 12.8 G/DL (ref 12–16)
INR PPP: 0.92 (ref 0.85–1.15)
LYMPHOCYTES # BLD: 2.6 K/UL (ref 1–5.1)
LYMPHOCYTES NFR BLD: 48.8 %
MCH RBC QN AUTO: 30.6 PG (ref 26–34)
MCHC RBC AUTO-ENTMCNC: 33.9 G/DL (ref 31–36)
MCV RBC AUTO: 90.2 FL (ref 80–100)
MONOCYTES # BLD: 0.4 K/UL (ref 0–1.3)
MONOCYTES NFR BLD: 7.4 %
NEUTROPHILS # BLD: 2.2 K/UL (ref 1.7–7.7)
NEUTROPHILS NFR BLD: 41.3 %
PLATELET # BLD AUTO: 242 K/UL (ref 135–450)
PMV BLD AUTO: 9.5 FL (ref 5–10.5)
POTASSIUM SERPL-SCNC: 4.3 MMOL/L (ref 3.5–5.1)
PROTHROMBIN TIME: 12.5 SEC (ref 11.9–14.9)
RBC # BLD AUTO: 4.17 M/UL (ref 4–5.2)
SODIUM SERPL-SCNC: 142 MMOL/L (ref 136–145)
WBC # BLD AUTO: 5.3 K/UL (ref 4–11)

## 2024-05-14 PROCEDURE — 3074F SYST BP LT 130 MM HG: CPT | Performed by: INTERNAL MEDICINE

## 2024-05-14 PROCEDURE — 93000 ELECTROCARDIOGRAM COMPLETE: CPT | Performed by: INTERNAL MEDICINE

## 2024-05-14 PROCEDURE — 99214 OFFICE O/P EST MOD 30 MIN: CPT | Performed by: INTERNAL MEDICINE

## 2024-05-14 PROCEDURE — 3078F DIAST BP <80 MM HG: CPT | Performed by: INTERNAL MEDICINE

## 2024-05-14 NOTE — PATIENT INSTRUCTIONS
Avoid NSAID's (Motrin, Aleve, Advil, Ibuprofen),  vitamin supplements and fish oil 1 week prior to procedure

## 2024-05-14 NOTE — PROGRESS NOTES
[facility].        Pre-Operative Risk assessment using 2014 ACC/AHA guidelines     Emergent procedure No  Active Cardiac Condition No (decompensated HF, Arrhythmia, MI <3 weeks, severe valve disease)  Risk Level of Procedure Intermediate Risk (intraperitoneal, intrathoracic, HENT, orthopedic, or carotid endarterectomy, etc.)  Revised Cardiac Risk Index Risk factors: None  Measurement of Exercise Tolerance before Surgery >4 Yes    According to the 2014 ACC/AHA pre-operative risk assessment guidelines Arelis Smith is a low risk for major cardiac complications during a intermediate risk procedure and may continue as planned. Specific medication recommendations are listed below. Medications recommended to continue should be taken with a sip of water even when NPO.     Further recommendations from consultants: None    Medication Recommendations:  ACEI/ARB Hold one dose piror to surgery  Diuretics HOLD the morning dose on the day of surgery  Statins should be continued the day of surgery        Encounter Diagnoses   Name Primary?    Preop exam for internal medicine Yes    Traumatic tear of right rotator cuff, unspecified tear extent, initial encounter     Biceps tendinosis of right shoulder     Benign essential HTN--blood pressure is well-controlled     Graves' disease--asymptomatic.  Under the care of endocrinology     Metabolic syndrome     Impaired fasting blood sugar--asymptomatic.  No signs of glucose toxicity     SAVANAH on CPAP        Plan:  Acceptable risk for the planned procedure.  No contraindications at this time    Pt will avoid fish oil, Multivitamin, Vit E, Aspirin and NSAID's 1 week prior to surgery    Lab drawn today--reviewed.  No contraindications for surgery    EKG-see report.  No contraindications for surgery      On this date 5/14/2024 I have spent 32 minutes reviewing previous notes, test results and face to face with the patient discussing the diagnosis and importance of compliance

## 2024-05-14 NOTE — H&P (VIEW-ONLY)
[facility] Internal Medicine  Patient Encounter  Dr. Jb Trejo      Chief Complaint   Patient presents with    Pre-op Exam     Rt arthroscopic rotator cuff repair on 5.28.24 with Dr. Greg Camacho       HPI-- 53 y.o. female presents today for a preoperative physical.  Pt diagnosed with right rotator cuff tear.  Patient was initially evaluated on 3/22/2024 with complaint of intermittent right shoulder pain that has been ongoing for about a year.  She recalls the pain starting when she was walking her dog and the dog lurched forward pulling her right arm.  From then on, she had intermittent pain exacerbated by certain movements.  On examination she had rotator cuff impingement signs.  She was referred to orthopedics.  Patient was sent for an MRI which was done on 4/20/2024.  This revealed a near complete tear of the supraspinatus tendon along with additional rotator cuff tendinopathy, biceps tendinopathy and AC joint arthritis.  Patient has failed outpatient conservative management including exercises, anti-inflammatory therapy.  Patient is now scheduled for right arthroscopic surgery with rotator cuff repair.  I have been asked to see patient for pre-operative risk assessment and clearance.   Surgery scheduled for 5/28/2024 by Dr. Greg Camacho at [facility]    Patient has other medical problems including but not limited to hypertension, impaired fasting glucose, hyperlipidemia, Graves' disease, depression, and allergic rhinitis, SAVANAH (new).    Overall, she has been feeling well.  She denies CP, SOB, orthopnea, swelling, nausea, vomiting, diarrhea.  She denies hematochezia, melena.  She denies dysuria, urinary frequency, urgency.    She states she does trend toward constipation.        Medical/Surgical Histories     Past Medical History:   Diagnosis Date    Allergic rhinitis     Caesarean Section     7/10/94    Depression     well controlled    Dysplastic nevus 07/21/2010    Graves' disease 08/08/2023

## 2024-05-15 RX ORDER — CETIRIZINE HYDROCHLORIDE 10 MG/1
10 TABLET ORAL DAILY
COMMUNITY

## 2024-05-15 NOTE — PROGRESS NOTES
[facility] PRE-OPERATIVE INSTRUCTIONS       DOS: __5/28/2024__        Pre-Op Instructions     [x]  A History and Physical will be required within 30 days prior to surgery date. Some patients may require cardiac or pulmonary clearance. H&P will be completed DOS for Endo/colonoscopy patients.     [x]  Reviewed Medical and Surgical history, medication list, confirmed with patient any implants, allergies, bleeding disorders, SAVANAH and reactions to Anesthesia.    [x]  If there is a change in physical condition between now and the day of surgery, please notify your surgeon. This includes a cough, cold, fever, sore throat, nausea, vomiting and diarrhea. Also notify your surgeon if you experience dizziness, shortness of breath or blurred vision.    [x] Reviewed hx of C-Diff, MRSA, VRE and/or recent use of Antibiotics     [x]  All patients having a procedure must have a ride home by a responsible person that is over the age of 18 and ensure it is someone that we can share medical information with. After discharge, a responsible adult needs to stay with you for 24 hours. There is a limit of 2 adult visitors per room.     If unable to secure ride and/or care taker, please contact surgeon's office.      [x]  No alcohol, smoking or marijuana use 24 hours prior to surgery. Any use of recreational drugs must be stopped 5 days prior to surgery.     [x]  NPO after midnight (Any heart, BP, seizure, thyroid and breathing medications are okay to take the morning of surgery with a small sip of water).    The morning of surgery, you may brush your teeth, just no swallowing water. Also, NO gum, candy, mints or ice chips.    []  For Colonoscopy's, follow prep-instructions as indicated by physician.     []  Patients with a insulin pump, keep set on basal rate on day of surgery. Long-acting insulin should be administered the evening before, take only half of usual dose. Always check with prescribing doctor if there are any

## 2024-05-16 NOTE — TELEPHONE ENCOUNTER
Auth: # [id number]    Date Range: 05/28/24 thru 07/26/24  Type of SX:  Outpatient  Location: [facility]  CPT: 03533, 45383   DX Code: S46.011D  SX area: Rt shoulder  Insurance: [employer]

## 2024-05-20 ENCOUNTER — TELEPHONE (OUTPATIENT)
Dept: ORTHOPEDIC SURGERY | Age: 54
End: 2024-05-20

## 2024-05-20 NOTE — TELEPHONE ENCOUNTER
General Question     Subject: RIGHT SHOULDER SX  Patient and /or Facility Request: Arelis Smith   Contact Number: [phone]     PATIENT CALLING REQUESTING A CALL BACK FROM SOMEONE IN DR LOMBARDO'S OFFICE WANTING TO KNOW HOW MUCH HER SURGERY IS GOING TO BE     PLEASE CALL THE PATIENT BACK AT THE ABOVE NUMBER

## 2024-05-20 NOTE — TELEPHONE ENCOUNTER
Called patient spoke with her about the  number and that Dr. Camacho is going to call to have a Peer 2 Peer to have code [id number] hopefully approved. We will call her with information tomorrow about approval and .

## 2024-05-21 ENCOUNTER — PATIENT MESSAGE (OUTPATIENT)
Age: 54
End: 2024-05-21

## 2024-05-21 NOTE — TELEPHONE ENCOUNTER
Approval for the arthroscopic AC resection in addition to the already approved arthroscopic rotator cuff repair has been obtained after [id number] with Dr. Cutler    Authorization # [id number] good from dates 5/28/24 through 7/26/24

## 2024-05-22 NOTE — TELEPHONE ENCOUNTER
Dr. Camacho did a Peer to Peer for this patient, Authorization # [id number] good from dates 5/28/24 through 7/26/24. PLEASE ADVISE.     Thanks

## 2024-05-22 NOTE — TELEPHONE ENCOUNTER
Called patient, Spoke with patient about  and what bills she may for may not receive from this procedure. Patient spoke with  and I explained that she will receive 2 to 3 bills for the surgery through Dr. Doug Ackerman and [facility].

## 2024-05-24 ENCOUNTER — ANESTHESIA EVENT (OUTPATIENT)
Age: 54
End: 2024-05-24
Payer: COMMERCIAL

## 2024-05-28 ENCOUNTER — HOSPITAL ENCOUNTER (OUTPATIENT)
Age: 54
Setting detail: OUTPATIENT SURGERY
Discharge: HOME | End: 2024-05-28
Attending: ORTHOPAEDIC SURGERY | Admitting: ORTHOPAEDIC SURGERY
Payer: COMMERCIAL

## 2024-05-28 ENCOUNTER — ANESTHESIA (OUTPATIENT)
Age: 54
End: 2024-05-28
Payer: COMMERCIAL

## 2024-05-28 VITALS
TEMPERATURE: 97.5 F | RESPIRATION RATE: 15 BRPM | HEIGHT: 61 IN | DIASTOLIC BLOOD PRESSURE: 65 MMHG | HEART RATE: 105 BPM | WEIGHT: 190 LBS | OXYGEN SATURATION: 88 % | BODY MASS INDEX: 35.87 KG/M2 | SYSTOLIC BLOOD PRESSURE: 105 MMHG

## 2024-05-28 DIAGNOSIS — S46.011D TRAUMATIC COMPLETE TEAR OF RIGHT ROTATOR CUFF, SUBSEQUENT ENCOUNTER: Primary | ICD-10-CM

## 2024-05-28 PROCEDURE — 3700000001 HC ADD 15 MINUTES (ANESTHESIA): Performed by: ORTHOPAEDIC SURGERY

## 2024-05-28 PROCEDURE — 7100000010 HC PHASE II RECOVERY - FIRST 15 MIN: Performed by: ORTHOPAEDIC SURGERY

## 2024-05-28 PROCEDURE — 6360000002 HC RX W HCPCS: Performed by: NURSE ANESTHETIST, CERTIFIED REGISTERED

## 2024-05-28 PROCEDURE — 2700000000 HC OXYGEN THERAPY PER DAY

## 2024-05-28 PROCEDURE — 2709999900 HC NON-CHARGEABLE SUPPLY: Performed by: ORTHOPAEDIC SURGERY

## 2024-05-28 PROCEDURE — 6360000002 HC RX W HCPCS: Performed by: ORTHOPAEDIC SURGERY

## 2024-05-28 PROCEDURE — 2500000003 HC RX 250 WO HCPCS: Performed by: NURSE ANESTHETIST, CERTIFIED REGISTERED

## 2024-05-28 PROCEDURE — C1713 ANCHOR/SCREW BN/BN,TIS/BN: HCPCS | Performed by: ORTHOPAEDIC SURGERY

## 2024-05-28 PROCEDURE — 7100000001 HC PACU RECOVERY - ADDTL 15 MIN: Performed by: ORTHOPAEDIC SURGERY

## 2024-05-28 PROCEDURE — 6360000002 HC RX W HCPCS: Performed by: ANESTHESIOLOGY

## 2024-05-28 PROCEDURE — 7100000011 HC PHASE II RECOVERY - ADDTL 15 MIN: Performed by: ORTHOPAEDIC SURGERY

## 2024-05-28 PROCEDURE — 29824 SHO ARTHRS SRG DSTL CLAVICLC: CPT | Performed by: ORTHOPAEDIC SURGERY

## 2024-05-28 PROCEDURE — 2720000010 HC SURG SUPPLY STERILE: Performed by: ORTHOPAEDIC SURGERY

## 2024-05-28 PROCEDURE — 3600000004 HC SURGERY LEVEL 4 BASE: Performed by: ORTHOPAEDIC SURGERY

## 2024-05-28 PROCEDURE — 64415 NJX AA&/STRD BRCH PLXS IMG: CPT | Performed by: ANESTHESIOLOGY

## 2024-05-28 PROCEDURE — 2580000003 HC RX 258: Performed by: NURSE ANESTHETIST, CERTIFIED REGISTERED

## 2024-05-28 PROCEDURE — 2580000003 HC RX 258: Performed by: ORTHOPAEDIC SURGERY

## 2024-05-28 PROCEDURE — 3700000000 HC ANESTHESIA ATTENDED CARE: Performed by: ORTHOPAEDIC SURGERY

## 2024-05-28 PROCEDURE — 94761 N-INVAS EAR/PLS OXIMETRY MLT: CPT

## 2024-05-28 PROCEDURE — 7100000000 HC PACU RECOVERY - FIRST 15 MIN: Performed by: ORTHOPAEDIC SURGERY

## 2024-05-28 PROCEDURE — 6370000000 HC RX 637 (ALT 250 FOR IP): Performed by: ORTHOPAEDIC SURGERY

## 2024-05-28 PROCEDURE — 29827 SHO ARTHRS SRG RT8TR CUF RPR: CPT | Performed by: ORTHOPAEDIC SURGERY

## 2024-05-28 PROCEDURE — 94150 VITAL CAPACITY TEST: CPT

## 2024-05-28 PROCEDURE — 2580000003 HC RX 258: Performed by: ANESTHESIOLOGY

## 2024-05-28 PROCEDURE — 3600000014 HC SURGERY LEVEL 4 ADDTL 15MIN: Performed by: ORTHOPAEDIC SURGERY

## 2024-05-28 DEVICE — ANCHOR BONE SP FBRTAK RC FBRTPE BLK/BLU: Type: IMPLANTABLE DEVICE | Status: FUNCTIONAL

## 2024-05-28 DEVICE — ANCHOR SUTURE L 24.5 MM DIA 4.75 MM SUTURE SZ 2 B-TCP/ PEEK: Type: IMPLANTABLE DEVICE | Status: FUNCTIONAL

## 2024-05-28 DEVICE — ANCHOR BONE SP FBRTAK RC TGRTPE BLU: Type: IMPLANTABLE DEVICE | Status: FUNCTIONAL

## 2024-05-28 RX ORDER — SODIUM CHLORIDE 9 MG/ML
INJECTION, SOLUTION INTRAVENOUS PRN
Status: DISCONTINUED | OUTPATIENT
Start: 2024-05-28 | End: 2024-05-28 | Stop reason: SDUPTHER

## 2024-05-28 RX ORDER — ONDANSETRON 2 MG/ML
4 INJECTION INTRAMUSCULAR; INTRAVENOUS
Status: DISCONTINUED | OUTPATIENT
Start: 2024-05-28 | End: 2024-05-28 | Stop reason: HOSPADM

## 2024-05-28 RX ORDER — DEXAMETHASONE SODIUM PHOSPHATE 10 MG/ML
8 INJECTION, SOLUTION INTRAMUSCULAR; INTRAVENOUS ONCE
Status: COMPLETED | OUTPATIENT
Start: 2024-05-28 | End: 2024-05-28

## 2024-05-28 RX ORDER — GLYCOPYRROLATE 0.2 MG/ML
INJECTION INTRAMUSCULAR; INTRAVENOUS PRN
Status: DISCONTINUED | OUTPATIENT
Start: 2024-05-28 | End: 2024-05-28 | Stop reason: SDUPTHER

## 2024-05-28 RX ORDER — SODIUM CHLORIDE 0.9 % (FLUSH) 0.9 %
5-40 SYRINGE (ML) INJECTION EVERY 12 HOURS SCHEDULED
Status: DISCONTINUED | OUTPATIENT
Start: 2024-05-28 | End: 2024-05-28 | Stop reason: HOSPADM

## 2024-05-28 RX ORDER — FENTANYL CITRATE 50 UG/ML
50 INJECTION, SOLUTION INTRAMUSCULAR; INTRAVENOUS EVERY 5 MIN PRN
Status: DISCONTINUED | OUTPATIENT
Start: 2024-05-28 | End: 2024-05-28 | Stop reason: HOSPADM

## 2024-05-28 RX ORDER — FENTANYL CITRATE 50 UG/ML
25 INJECTION, SOLUTION INTRAMUSCULAR; INTRAVENOUS EVERY 5 MIN PRN
Status: DISCONTINUED | OUTPATIENT
Start: 2024-05-28 | End: 2024-05-28 | Stop reason: HOSPADM

## 2024-05-28 RX ORDER — SODIUM CHLORIDE 0.9 % (FLUSH) 0.9 %
5-40 SYRINGE (ML) INJECTION PRN
Status: DISCONTINUED | OUTPATIENT
Start: 2024-05-28 | End: 2024-05-28 | Stop reason: HOSPADM

## 2024-05-28 RX ORDER — PHENYLEPHRINE HCL IN 0.9% NACL 1 MG/10 ML
SYRINGE (ML) INTRAVENOUS PRN
Status: DISCONTINUED | OUTPATIENT
Start: 2024-05-28 | End: 2024-05-28 | Stop reason: SDUPTHER

## 2024-05-28 RX ORDER — PROMETHAZINE HYDROCHLORIDE 25 MG/1
25 TABLET ORAL EVERY 6 HOURS PRN
Qty: 15 TABLET | Refills: 0 | Status: SHIPPED | OUTPATIENT
Start: 2024-05-28 | End: 2024-06-04

## 2024-05-28 RX ORDER — ACETAMINOPHEN 500 MG
1000 TABLET ORAL ONCE
Status: COMPLETED | OUTPATIENT
Start: 2024-05-28 | End: 2024-05-28

## 2024-05-28 RX ORDER — SUCCINYLCHOLINE/SOD CL,ISO/PF 200MG/10ML
SYRINGE (ML) INTRAVENOUS PRN
Status: DISCONTINUED | OUTPATIENT
Start: 2024-05-28 | End: 2024-05-28 | Stop reason: SDUPTHER

## 2024-05-28 RX ORDER — TRANEXAMIC ACID 650 MG/1
1950 TABLET ORAL
Status: DISCONTINUED | OUTPATIENT
Start: 2024-05-28 | End: 2024-05-28 | Stop reason: HOSPADM

## 2024-05-28 RX ORDER — MIDAZOLAM HYDROCHLORIDE 1 MG/ML
1 INJECTION INTRAMUSCULAR; INTRAVENOUS EVERY 5 MIN PRN
Status: DISCONTINUED | OUTPATIENT
Start: 2024-05-28 | End: 2024-05-28 | Stop reason: HOSPADM

## 2024-05-28 RX ORDER — SODIUM CHLORIDE 0.9 % (FLUSH) 0.9 %
5-40 SYRINGE (ML) INJECTION PRN
Status: DISCONTINUED | OUTPATIENT
Start: 2024-05-28 | End: 2024-05-28 | Stop reason: SDUPTHER

## 2024-05-28 RX ORDER — SODIUM CHLORIDE 9 MG/ML
INJECTION, SOLUTION INTRAVENOUS PRN
Status: DISCONTINUED | OUTPATIENT
Start: 2024-05-28 | End: 2024-05-28 | Stop reason: HOSPADM

## 2024-05-28 RX ORDER — MELOXICAM 7.5 MG/1
7.5 TABLET ORAL ONCE
Status: COMPLETED | OUTPATIENT
Start: 2024-05-28 | End: 2024-05-28

## 2024-05-28 RX ORDER — SODIUM CHLORIDE 9 MG/ML
INJECTION, SOLUTION INTRAVENOUS CONTINUOUS PRN
Status: DISCONTINUED | OUTPATIENT
Start: 2024-05-28 | End: 2024-05-28 | Stop reason: SDUPTHER

## 2024-05-28 RX ORDER — BUPIVACAINE HYDROCHLORIDE 5 MG/ML
INJECTION, SOLUTION EPIDURAL; INTRACAUDAL PRN
Status: DISCONTINUED | OUTPATIENT
Start: 2024-05-28 | End: 2024-05-28 | Stop reason: ALTCHOICE

## 2024-05-28 RX ORDER — ROCURONIUM BROMIDE 10 MG/ML
INJECTION, SOLUTION INTRAVENOUS PRN
Status: DISCONTINUED | OUTPATIENT
Start: 2024-05-28 | End: 2024-05-28 | Stop reason: SDUPTHER

## 2024-05-28 RX ORDER — NALOXONE HYDROCHLORIDE 0.4 MG/ML
INJECTION, SOLUTION INTRAMUSCULAR; INTRAVENOUS; SUBCUTANEOUS PRN
Status: DISCONTINUED | OUTPATIENT
Start: 2024-05-28 | End: 2024-05-28 | Stop reason: HOSPADM

## 2024-05-28 RX ORDER — OXYCODONE HYDROCHLORIDE AND ACETAMINOPHEN 5; 325 MG/1; MG/1
1-2 TABLET ORAL EVERY 6 HOURS PRN
Qty: 50 TABLET | Refills: 0 | Status: SHIPPED | OUTPATIENT
Start: 2024-05-28 | End: 2024-06-04

## 2024-05-28 RX ORDER — ONDANSETRON 2 MG/ML
INJECTION INTRAMUSCULAR; INTRAVENOUS PRN
Status: DISCONTINUED | OUTPATIENT
Start: 2024-05-28 | End: 2024-05-28 | Stop reason: SDUPTHER

## 2024-05-28 RX ORDER — MIDAZOLAM HYDROCHLORIDE 1 MG/ML
INJECTION INTRAMUSCULAR; INTRAVENOUS PRN
Status: DISCONTINUED | OUTPATIENT
Start: 2024-05-28 | End: 2024-05-28 | Stop reason: SDUPTHER

## 2024-05-28 RX ORDER — SODIUM CHLORIDE 0.9 % (FLUSH) 0.9 %
5-40 SYRINGE (ML) INJECTION EVERY 12 HOURS SCHEDULED
Status: DISCONTINUED | OUTPATIENT
Start: 2024-05-28 | End: 2024-05-28 | Stop reason: SDUPTHER

## 2024-05-28 RX ORDER — FENTANYL CITRATE 50 UG/ML
INJECTION, SOLUTION INTRAMUSCULAR; INTRAVENOUS PRN
Status: DISCONTINUED | OUTPATIENT
Start: 2024-05-28 | End: 2024-05-28 | Stop reason: SDUPTHER

## 2024-05-28 RX ORDER — ROPIVACAINE HYDROCHLORIDE 5 MG/ML
INJECTION, SOLUTION EPIDURAL; INFILTRATION; PERINEURAL
Status: COMPLETED | OUTPATIENT
Start: 2024-05-28 | End: 2024-05-28

## 2024-05-28 RX ORDER — SODIUM CHLORIDE 9 MG/ML
INJECTION, SOLUTION INTRAVENOUS CONTINUOUS
Status: DISCONTINUED | OUTPATIENT
Start: 2024-05-28 | End: 2024-05-28 | Stop reason: HOSPADM

## 2024-05-28 RX ORDER — OXYCODONE HYDROCHLORIDE 5 MG/1
5 TABLET ORAL
Status: DISCONTINUED | OUTPATIENT
Start: 2024-05-28 | End: 2024-05-28 | Stop reason: HOSPADM

## 2024-05-28 RX ORDER — LIDOCAINE HYDROCHLORIDE 20 MG/ML
INJECTION, SOLUTION INTRAVENOUS PRN
Status: DISCONTINUED | OUTPATIENT
Start: 2024-05-28 | End: 2024-05-28 | Stop reason: SDUPTHER

## 2024-05-28 RX ORDER — PROPOFOL 10 MG/ML
INJECTION, EMULSION INTRAVENOUS PRN
Status: DISCONTINUED | OUTPATIENT
Start: 2024-05-28 | End: 2024-05-28 | Stop reason: SDUPTHER

## 2024-05-28 RX ADMIN — Medication 200 MCG: at 10:07

## 2024-05-28 RX ADMIN — ROCURONIUM BROMIDE 10 MG: 10 INJECTION, SOLUTION INTRAVENOUS at 08:36

## 2024-05-28 RX ADMIN — SODIUM CHLORIDE: 9 INJECTION, SOLUTION INTRAVENOUS at 07:30

## 2024-05-28 RX ADMIN — SUGAMMADEX 200 MG: 100 INJECTION, SOLUTION INTRAVENOUS at 10:25

## 2024-05-28 RX ADMIN — Medication 100 MCG: at 09:48

## 2024-05-28 RX ADMIN — PROPOFOL 160 MG: 10 INJECTION, EMULSION INTRAVENOUS at 07:37

## 2024-05-28 RX ADMIN — ROCURONIUM BROMIDE 40 MG: 10 INJECTION, SOLUTION INTRAVENOUS at 07:41

## 2024-05-28 RX ADMIN — MIDAZOLAM 1 MG: 1 INJECTION INTRAMUSCULAR; INTRAVENOUS at 07:31

## 2024-05-28 RX ADMIN — Medication 100 MCG: at 08:25

## 2024-05-28 RX ADMIN — FENTANYL CITRATE 50 MCG: 50 INJECTION INTRAMUSCULAR; INTRAVENOUS at 07:11

## 2024-05-28 RX ADMIN — Medication 100 MCG: at 09:37

## 2024-05-28 RX ADMIN — ROPIVACAINE HYDROCHLORIDE 30 ML: 5 INJECTION EPIDURAL; INFILTRATION; PERINEURAL at 07:15

## 2024-05-28 RX ADMIN — SODIUM CHLORIDE: 9 INJECTION, SOLUTION INTRAVENOUS at 09:22

## 2024-05-28 RX ADMIN — Medication 100 MCG: at 08:01

## 2024-05-28 RX ADMIN — ONDANSETRON 4 MG: 2 INJECTION INTRAMUSCULAR; INTRAVENOUS at 07:47

## 2024-05-28 RX ADMIN — GLYCOPYRROLATE 0.2 MG: 0.2 INJECTION, SOLUTION INTRAMUSCULAR; INTRAVENOUS at 08:01

## 2024-05-28 RX ADMIN — HYDROMORPHONE HYDROCHLORIDE 0.5 MG: 1 INJECTION, SOLUTION INTRAMUSCULAR; INTRAVENOUS; SUBCUTANEOUS at 10:30

## 2024-05-28 RX ADMIN — FENTANYL CITRATE 50 MCG: 50 INJECTION INTRAMUSCULAR; INTRAVENOUS at 07:36

## 2024-05-28 RX ADMIN — Medication 160 MG: at 07:38

## 2024-05-28 RX ADMIN — ACETAMINOPHEN 1000 MG: 500 TABLET ORAL at 06:49

## 2024-05-28 RX ADMIN — CEFAZOLIN 2000 MG: 2 INJECTION, POWDER, FOR SOLUTION INTRAMUSCULAR; INTRAVENOUS at 07:41

## 2024-05-28 RX ADMIN — Medication 100 MCG: at 08:36

## 2024-05-28 RX ADMIN — LIDOCAINE HYDROCHLORIDE 80 MG: 20 INJECTION, SOLUTION INTRAVENOUS at 07:36

## 2024-05-28 RX ADMIN — DEXAMETHASONE SODIUM PHOSPHATE 8 MG: 10 INJECTION, SOLUTION INTRAMUSCULAR; INTRAVENOUS at 07:09

## 2024-05-28 RX ADMIN — TRANEXAMIC ACID 1950 MG: 650 TABLET ORAL at 06:49

## 2024-05-28 RX ADMIN — HYDROMORPHONE HYDROCHLORIDE 0.5 MG: 1 INJECTION, SOLUTION INTRAMUSCULAR; INTRAVENOUS; SUBCUTANEOUS at 10:28

## 2024-05-28 RX ADMIN — Medication 100 MCG: at 08:52

## 2024-05-28 RX ADMIN — Medication 100 MCG: at 07:54

## 2024-05-28 RX ADMIN — Medication 100 MCG: at 07:47

## 2024-05-28 RX ADMIN — ROCURONIUM BROMIDE 10 MG: 10 INJECTION, SOLUTION INTRAVENOUS at 09:28

## 2024-05-28 RX ADMIN — ROCURONIUM BROMIDE 10 MG: 10 INJECTION, SOLUTION INTRAVENOUS at 10:11

## 2024-05-28 RX ADMIN — ROCURONIUM BROMIDE 10 MG: 10 INJECTION, SOLUTION INTRAVENOUS at 07:37

## 2024-05-28 RX ADMIN — Medication 200 MCG: at 09:12

## 2024-05-28 RX ADMIN — SODIUM CHLORIDE: 9 INJECTION, SOLUTION INTRAVENOUS at 07:15

## 2024-05-28 RX ADMIN — MIDAZOLAM 1 MG: 1 INJECTION INTRAMUSCULAR; INTRAVENOUS at 07:11

## 2024-05-28 RX ADMIN — MELOXICAM 7.5 MG: 7.5 TABLET ORAL at 06:48

## 2024-05-28 RX ADMIN — ROCURONIUM BROMIDE 10 MG: 10 INJECTION, SOLUTION INTRAVENOUS at 08:09

## 2024-05-28 ASSESSMENT — ENCOUNTER SYMPTOMS: SHORTNESS OF BREATH: 0

## 2024-05-28 ASSESSMENT — LIFESTYLE VARIABLES: SMOKING_STATUS: 0

## 2024-05-28 ASSESSMENT — PAIN SCALES - GENERAL
PAINLEVEL_OUTOF10: 0
PAINLEVEL_OUTOF10: 0

## 2024-05-28 ASSESSMENT — PAIN - FUNCTIONAL ASSESSMENT: PAIN_FUNCTIONAL_ASSESSMENT: NONE - DENIES PAIN

## 2024-05-28 NOTE — OP NOTE
Operative Note      Patient: Arelis Smith  YOB: 1970  MRN: [id number]    Date of Procedure: 5/28/2024    Pre-Op Diagnosis Codes:     * Traumatic complete tear of right rotator cuff, subsequent encounter [S46.011D]     * Arthritis of right acromioclavicular joint [M19.011]    Post-Op Diagnosis: Same       Procedure(s):  Right Arthroscopic Rotator Cuff Repair with tapestry patch AND Arthroscopic Acromioclavicular Joint Resection    Surgeon(s):  Greg Camacho MD    Assistant:   Surgical Assistant: Skyla Pandya    Anesthesia: General    Estimated Blood Loss (mL): less than 100     Complications: None    Specimens:   * No specimens in log *    Implants:  Implant Name Type Inv. Item Serial No.  Lot No. LRB No. Used Action   ANCHOR BONE SP FBRTAK RC FBRTPE BLK/TRUMAN  - [id number]  ANCHOR BONE SP FBRTAK RC FBRTPE BLK/TRUMAN   [employer] [id number] Right 1 Implanted   ANCHOR BONE SP FBRTAK RC TGRTPE TRUMAN  - [id number]  ANCHOR BONE SP FBRTAK RC TGRTPE TRUMAN   [employer] [facility] [id number] Right 1 Implanted   ANCHOR SUTURE L 24.5 MM ROMAINE 4.75 MM SUTURE SZ 2 B-TCP/ PEEK - [id number]  ANCHOR SUTURE L 24.5 MM ROMAINE 4.75 MM SUTURE SZ 2 B-TCP/ PEEK  [employer] [id number] Right 1 Implanted   ANCHOR SUTURE L 24.5 MM ROMAINE 4.75 MM SUTURE SZ 2 B-TCP/ PEEK - [id number]  ANCHOR SUTURE L 24.5 MM ROMAINE 4.75 MM SUTURE SZ 2 B-TCP/ PEEK  [employer] [id number] Right 1 Implanted   tapestry biointegrative implant with introducer     [id number] Right 1 Implanted         Drains: * No LDAs found *    Findings:  Infection Present At Time Of Surgery (PATOS) (choose all levels that have infection present):  No infection present  Other Findings: complete U-shaped tear of the surpraspinatus tendon, reduced and stabilized with double row anchor repair and augmented with Tapestry collagen patch, AC joint arthritis removed with arthroscopic bur.     Detailed Description of Procedure:     Indications:  Persistent shoulder pain

## 2024-05-28 NOTE — PROGRESS NOTES
Pt discharged home; verbalizes understanding of d/c instructions including medication orders for home and possible side effects associated with them.  Pt instructed to call the doctor listed if they should have any complications or worsening of symptoms.

## 2024-05-28 NOTE — ANESTHESIA PRE PROCEDURE
Department of Anesthesiology  Preprocedure Note       Name:  Arelis Smith   Age:  53 y.o.  :  1970                                          MRN:  [id number]         Date:  2024      Surgeon: Surgeon(s):  Greg Camacho MD    Procedure: Procedure(s):  Right Arthroscopic Rotator Cuff Repair with TAPESTRY PATCH AND Arthroscopic Acromioclavicular Joint Resection    Medications prior to admission:   Prior to Admission medications    Medication Sig Start Date End Date Taking? Authorizing Provider   MULTIPLE VITAMIN PO Take by mouth   Yes ProviderGwendolyn MD   cetirizine (ZYRTEC) 10 MG tablet Take 1 tablet by mouth daily   Yes ProviderGwendolyn MD   pravastatin (PRAVACHOL) 20 MG tablet Take 1 tablet by mouth daily 24   Jb Trejo DO   potassium chloride (KLOR-CON M20) 20 MEQ extended release tablet TAKE 1 TABLET TWICE A DAY 24   Jb Trejo DO   amLODIPine (NORVASC) 2.5 MG tablet Take 1 tablet by mouth daily 24   Jb Trejo DO   naproxen (NAPROSYN) 500 MG tablet Take 1 tablet by mouth 2 times daily (with meals) 3/22/24   Jb Trejo DO   hydroCHLOROthiazide (HYDRODIURIL) 25 MG tablet TAKE 1 TABLET DAILY 23   Jb Trejo DO   venlafaxine (EFFEXOR XR) 150 MG extended release capsule Take 1 capsule by mouth daily 10/23/23   Jb Trejo DO   losartan (COZAAR) 100 MG tablet TAKE 1 TABLET DAILY 23   Jb Trejo DO       Current medications:    Current Facility-Administered Medications   Medication Dose Route Frequency Provider Last Rate Last Admin    sodium chloride flush 0.9 % injection 5-40 mL  5-40 mL IntraVENous 2 times per day Isaiah Armstrong MD        sodium chloride flush 0.9 % injection 5-40 mL  5-40 mL IntraVENous PRN Isaiah Armstrong MD        0.9 % sodium chloride infusion   IntraVENous PRN Isaiah Armstrong MD        acetaminophen (TYLENOL) tablet 1,000 mg  1,000 mg Oral Once Greg Camacho 
Ambulnz

## 2024-05-28 NOTE — ANESTHESIA PROCEDURE NOTES
Peripheral Block    Patient location during procedure: pre-op  Reason for block: post-op pain management and at surgeon's request  Start time: 5/28/2024 7:11 AM  End time: 5/28/2024 7:17 AM  Staffing  Performed: anesthesiologist   Anesthesiologist: Inna Guzman MD  Performed by: Inna Guzman MD  Authorized by: Inna Guzman MD    Preanesthetic Checklist  Completed: patient identified, IV checked, site marked, risks and benefits discussed, surgical/procedural consents, equipment checked, pre-op evaluation, timeout performed, anesthesia consent given, oxygen available and monitors applied/VS acknowledged  Peripheral Block   Patient position: sitting  Prep: ChloraPrep  Patient monitoring: cardiac monitor, continuous pulse ox, frequent blood pressure checks, IV access, oxygen and responsive to questions  Block type: Brachial plexus  Interscalene  Laterality: right  Injection technique: single-shot  Guidance: ultrasound guided    Needle   Needle type: insulated echogenic nerve stimulator needle   Needle gauge: 22 G  Needle localization: ultrasound guidance  Needle length: 2in.  Assessment   Injection assessment: negative aspiration for heme, no paresthesia on injection, local visualized surrounding nerve on ultrasound and no intravascular symptoms  Paresthesia pain: none  Slow fractionated injection: yes  Hemodynamics: stable  Outcomes: patient tolerated procedure well and uncomplicated    Additional Notes  Immediately prior to procedure a \"time out\" was called to verify the correct patient, allergies, laterality, procedure and equipment. Time out performed with Terrell Olmos CRNA    Local Anesthetic: 0.5 %  Ropivacaine   Amount: 30 ml  in 5 ml increments after negative aspiration each time.    Anterior scalene and middle scalene muscles, upper, middle and lower trunks of the brachial plexus are identified, the tip of the needle and the spread of the local anesthetic around the brachial plexus are

## 2024-05-28 NOTE — DISCHARGE INSTRUCTIONS
hours  Keep bandage clean and dry for first 3 days  Leave bandage on for 3 days, then remove, but leave steri-strips in place.  It is ok to shower over steri-strips covering shoulder wound starting 3 days after surgery (Friday).  Move hand, wrist and elbow actively every hour while awake. Move shoulder gently in 5-7 days. Clean arm pit daily.    No lifting, pushing or pulling with operative upper extremity for 4 weeks.  Wear sling while sleeping for first 4 weeks.  May remove sling while seated and arm supported.  No active shoulder abduction or external rotation exercises for 4 weeks.

## 2024-05-28 NOTE — BRIEF OP NOTE
Brief Postoperative Note      Patient: Arelis Smith  YOB: 1970  MRN: [id number]    Date of Procedure: 5/28/2024    Pre-Op Diagnosis Codes:     * Traumatic complete tear of right rotator cuff, subsequent encounter [S46.011D]     * Arthritis of right acromioclavicular joint [M19.011]    Post-Op Diagnosis: Same       Procedure(s):  Right Arthroscopic Rotator Cuff Repair with tapestry patch augmentation AND Arthroscopic Acromioclavicular Joint Resection    Surgeon(s):  Greg Camacho MD    Assistant:  Surgical Assistant: Skyla Pandya    Anesthesia: General    Estimated Blood Loss (mL): less than 100     Complications: None    Specimens:   * No specimens in log *    Implants:  Implant Name Type Inv. Item Serial No.  Lot No. LRB No. Used Action   ANCHOR BONE SP FBRTAK RC TGRTPE TRUMAN  - [id number]  ANCHOR BONE SP FBRTAK RC TGRTPE TRUMAN   [employer]WD [id number] Right 1 Implanted   ANCHOR BONE SP FBRTAK RC FBRTPE BLK/TRUMAN  - [id number]  ANCHOR BONE SP FBRTAK RC FBRTPE BLK/TRUMAN   [employer]WD [id number] Right 1 Implanted   ANCHOR BONE SP FBRTAK RC TGRTPE TRUMAN  - [id number]  ANCHOR BONE SP FBRTAK RC TGRTPE TRUMAN   [employer]WD [id number] Right 1 Implanted   ANCHOR SUTURE L 24.5 MM ROMAINE 4.75 MM SUTURE SZ 2 B-TCP/ PEEK - [id number]  ANCHOR SUTURE L 24.5 MM ROMAINE 4.75 MM SUTURE SZ 2 B-TCP/ PEEK  [employer]WD [id number] Right 1 Implanted   ANCHOR SUTURE L 24.5 MM ROMAINE 4.75 MM SUTURE SZ 2 B-TCP/ PEEK - [id number]  ANCHOR SUTURE L 24.5 MM ROMAINE 4.75 MM SUTURE SZ 2 B-TCP/ PEEK  [employer]WD [id number] Right 1 Implanted   tapestry biointegrative implant with introducer     [id number] Right 1 Implanted         Drains: * No LDAs found *    Findings:  Infection Present At Time Of Surgery (PATOS) (choose all levels that have infection present):  No infection present  Other Findings: complete U-shaped tear of the surpraspinatus tendon, reduced and stabilized with double row anchor repair and augmented with

## 2024-05-28 NOTE — PROGRESS NOTES
1051 Patient received from OR in stable condition.  VSS.  OA in place. 4L 02@ via NC  Assessment complete.  Pain score :0/10  Surgical site:clean, dry and intact.  1117 OA removed  1142 Patient started on PO intake.   1305 Report given to Tiffanie KAUR

## 2024-05-28 NOTE — PROGRESS NOTES
Patient arrived to unit for procedure.  A&O, denies pain at this time.  Assessment complete. Consents obtained.  Right shoulder wiped with CHG wipe.

## 2024-05-28 NOTE — ANESTHESIA POSTPROCEDURE EVALUATION
Department of Anesthesiology  Postprocedure Note    Patient: Arelis Smith  MRN: [id number]  YOB: 1970  Date of evaluation: 5/28/2024    Procedure Summary       Date: 05/28/24 Room / Location: [address]    Anesthesia Start: 0731 Anesthesia Stop: 1055    Procedure: Right Arthroscopic Rotator Cuff Repair with tapestryt patch AND Arthroscopic Acromioclavicular Joint Resection (Right: Shoulder) Diagnosis:       Traumatic complete tear of right rotator cuff, subsequent encounter      Arthritis of right acromioclavicular joint      (Traumatic complete tear of right rotator cuff, subsequent encounter [S46.011D])      (Arthritis of right acromioclavicular joint [M19.011])    Surgeons: Greg Camacho MD Responsible Provider: Inna Guzman MD    Anesthesia Type: general ASA Status: 2            Anesthesia Type: No value filed.    Sandi Phase I: Sandi Score: 10    Sandi Phase II: Sandi Score: 10    Anesthesia Post Evaluation    Patient location during evaluation: PACU  Patient participation: complete - patient participated  Level of consciousness: awake and alert  Airway patency: patent  Nausea & Vomiting: no nausea and no vomiting  Cardiovascular status: hemodynamically stable  Respiratory status: acceptable  Hydration status: stable  Pain management: adequate    No notable events documented.

## 2024-05-28 NOTE — PROGRESS NOTES
Incentive Spirometry education and demonstration completed by Respiratory Therapy.  Turning over to Nursing for routine follow-up.  Minimum Predicted Vital Capacity - 478 mL.  Patient's Actual Vital Capacity - 1500 mL.

## 2024-05-28 NOTE — INTERVAL H&P NOTE
Patient seen and examined.  I have reviewed the history and physical and examined the patient and find no relevant changes.  Surgery plan reviewed.    Site marked and consent verified.  All questions answered and antibiotic verified.    Ready for Right shoulder arthroscopic assisted rotator cuff repair with possible patch augmentation and acromioclavicular joint resection.          Greg Camacho MD, FAAOS  Board Certified Orthopaedic Surgeon  [facility] Orthopaedic and Sports Medicine  [place] & [place]    Phone:[phone]  Fax [phone]    05/28/24  7:21 AM

## 2024-06-07 ASSESSMENT — SLEEP AND FATIGUE QUESTIONNAIRES
ESS TOTAL SCORE: 8
HOW LIKELY ARE YOU TO NOD OFF OR FALL ASLEEP WHILE SITTING INACTIVE IN A PUBLIC PLACE: WOULD NEVER DOZE
HOW LIKELY ARE YOU TO NOD OFF OR FALL ASLEEP WHEN YOU ARE A PASSENGER IN A CAR FOR AN HOUR WITHOUT A BREAK: SLIGHT CHANCE OF DOZING
HOW LIKELY ARE YOU TO NOD OFF OR FALL ASLEEP WHILE SITTING AND READING: HIGH CHANCE OF DOZING
HOW LIKELY ARE YOU TO NOD OFF OR FALL ASLEEP WHILE SITTING AND TALKING TO SOMEONE: WOULD NEVER DOZE
HOW LIKELY ARE YOU TO NOD OFF OR FALL ASLEEP WHILE WATCHING TV: SLIGHT CHANCE OF DOZING
HOW LIKELY ARE YOU TO NOD OFF OR FALL ASLEEP WHILE SITTING AND TALKING TO SOMEONE: WOULD NEVER DOZE
HOW LIKELY ARE YOU TO NOD OFF OR FALL ASLEEP WHILE LYING DOWN TO REST IN THE AFTERNOON WHEN CIRCUMSTANCES PERMIT: MODERATE CHANCE OF DOZING
HOW LIKELY ARE YOU TO NOD OFF OR FALL ASLEEP WHILE WATCHING TV: SLIGHT CHANCE OF DOZING
HOW LIKELY ARE YOU TO NOD OFF OR FALL ASLEEP WHILE LYING DOWN TO REST IN THE AFTERNOON WHEN CIRCUMSTANCES PERMIT: MODERATE CHANCE OF DOZING
HOW LIKELY ARE YOU TO NOD OFF OR FALL ASLEEP IN A CAR, WHILE STOPPED FOR A FEW MINUTES IN TRAFFIC: WOULD NEVER DOZE
HOW LIKELY ARE YOU TO NOD OFF OR FALL ASLEEP IN A CAR, WHILE STOPPED FOR A FEW MINUTES IN TRAFFIC: WOULD NEVER DOZE
HOW LIKELY ARE YOU TO NOD OFF OR FALL ASLEEP WHILE SITTING QUIETLY AFTER LUNCH WITHOUT ALCOHOL: SLIGHT CHANCE OF DOZING
HOW LIKELY ARE YOU TO NOD OFF OR FALL ASLEEP WHILE SITTING INACTIVE IN A PUBLIC PLACE: WOULD NEVER DOZE
HOW LIKELY ARE YOU TO NOD OFF OR FALL ASLEEP WHEN YOU ARE A PASSENGER IN A CAR FOR AN HOUR WITHOUT A BREAK: SLIGHT CHANCE OF DOZING
HOW LIKELY ARE YOU TO NOD OFF OR FALL ASLEEP WHILE SITTING AND READING: HIGH CHANCE OF DOZING
HOW LIKELY ARE YOU TO NOD OFF OR FALL ASLEEP WHILE SITTING QUIETLY AFTER LUNCH WITHOUT ALCOHOL: SLIGHT CHANCE OF DOZING

## 2024-06-10 ENCOUNTER — OFFICE VISIT (OUTPATIENT)
Age: 54
End: 2024-06-10

## 2024-06-10 ENCOUNTER — OFFICE VISIT (OUTPATIENT)
Age: 54
End: 2024-06-10
Payer: COMMERCIAL

## 2024-06-10 VITALS — BODY MASS INDEX: 35.87 KG/M2 | WEIGHT: 190 LBS | HEIGHT: 61 IN

## 2024-06-10 VITALS
HEIGHT: 61 IN | DIASTOLIC BLOOD PRESSURE: 85 MMHG | TEMPERATURE: 89 F | OXYGEN SATURATION: 98 % | BODY MASS INDEX: 35.87 KG/M2 | SYSTOLIC BLOOD PRESSURE: 122 MMHG | WEIGHT: 190 LBS

## 2024-06-10 DIAGNOSIS — S46.011D TRAUMATIC COMPLETE TEAR OF RIGHT ROTATOR CUFF, SUBSEQUENT ENCOUNTER: Primary | ICD-10-CM

## 2024-06-10 DIAGNOSIS — M19.011 ARTHRITIS OF RIGHT ACROMIOCLAVICULAR JOINT: ICD-10-CM

## 2024-06-10 DIAGNOSIS — E66.9 OBESITY (BMI 30-39.9): ICD-10-CM

## 2024-06-10 DIAGNOSIS — G47.33 OSA ON CPAP: Primary | ICD-10-CM

## 2024-06-10 DIAGNOSIS — I10 BENIGN ESSENTIAL HTN: ICD-10-CM

## 2024-06-10 PROCEDURE — 99213 OFFICE O/P EST LOW 20 MIN: CPT | Performed by: STUDENT IN AN ORGANIZED HEALTH CARE EDUCATION/TRAINING PROGRAM

## 2024-06-10 PROCEDURE — 3074F SYST BP LT 130 MM HG: CPT | Performed by: STUDENT IN AN ORGANIZED HEALTH CARE EDUCATION/TRAINING PROGRAM

## 2024-06-10 PROCEDURE — 3079F DIAST BP 80-89 MM HG: CPT | Performed by: STUDENT IN AN ORGANIZED HEALTH CARE EDUCATION/TRAINING PROGRAM

## 2024-06-10 PROCEDURE — 99024 POSTOP FOLLOW-UP VISIT: CPT | Performed by: ORTHOPAEDIC SURGERY

## 2024-06-10 NOTE — PROGRESS NOTES
[facility]  Sleep Medicine  [address], [address]  [place] [address]    Chief Complaint   Patient presents with    Sleep Apnea     Pt here for follow up visit -          Arelis Smith comes in today for sleep apnea follow-up . She was diagnosed with moderate obstructive sleep apnea and is being treated with PAP therapy.   She has been on PAP therapy for a couple of months.  She states she wears the PAP device every night.     She falls asleep in  about 15 minutes.  She awakens 1-2 times per night and takes no naps. The average total amount of sleep is about 8 hours per night. She does not use sleep aid/s. Symptoms of sleep apnea have improved since using PAP therapy.  She is not snoring with the machine on.  She denies any complaints of too high pressure, hunger for air, dry mouth / nose, mask fit / discomfort issues, low air humidity, high air humidity, temperature issues, and high/frequent leaks.  She does complain of dry mouth / nose.  She was getting water in the house and she lowered humidity settings to off.  Now she is experiencing too much dry and cold air.  DME: [facility]  Mask: Airfit N20  Machine: ResMed Airsense 11 Autoset      DATA REVIEWED TODAY:    Diagnostic Review  HST on 3/25/2024 showed respiratory event index of 26.0/h with oxygen desaturation down to a cornelio of 84%.     [place]           6/7/2024    10:03 AM 3/11/2024     4:14 PM   Sleep Medicine   Sitting and reading 3 3   Watching TV 1 1   Sitting, inactive in a public place (e.g. a theatre or a meeting) 0 1   As a passenger in a car for an hour without a break 1 0   Lying down to rest in the afternoon when circumstances permit 2 1   Sitting and talking to someone 0 0   Sitting quietly after a lunch without alcohol 1 0   In a car, while stopped for a few minutes in traffic 0 0   [place] Sleepiness Score 8 6   Neck circumference (Inches)  16       PAP Adherence (dates: 4/11/2024 - 6/5/2024)  Total

## 2024-06-10 NOTE — PROGRESS NOTES
Arelis Smith  [id number]  Encounter Date: 6/10/2024  YOB: 1970    Chief Complaint   Patient presents with    Follow-up     PO RIGHT SHOULDER SCOPE       History:Ms. Arelis Smith is here in follow up regarding her right arthroscopic rotator cuff repair and AC joint resection.  She is 2 weeks post-op.  She denies fevers, chills or drainage from her wounds.  Her pain is mild and she is using her sling.    Pain Assessment  Location of Pain: Shoulder  Location Modifiers: Right  Severity of Pain: 3  Quality of Pain: Sharp, Dull  Duration of Pain: Persistent  Frequency of Pain: Constant  Aggravating Factors: Bending, Stretching    Exam:  Ht 1.549 m (5' 1\")   Wt 86.2 kg (190 lb)   LMP 05/06/2011   BMI 35.90 kg/m²   General: Alert and oriented x3, No acute distress, Cooperative and conversant.  Obesity Present  Mood and affect are appropriate.  Right shoulder: Incision sites are well-healed.  Her abduction sling was adjusted.  She can fully extend her elbow.  Right hand  strength is intact.  In supine position she tolerates passive forward flexion to almost 90 degrees and passive abduction to 70 degrees.  Soft end feel.  Sensation to light touch grossly present throughout the right upper extremity  Capillary refill < 2 seconds and palpable distal pulses  Skin: no erythema, lesions or rashes  No signs / symptoms of DVT / PE or infection    Assessment:   Diagnosis Orders   1. Traumatic complete tear of right rotator cuff, subsequent encounter        2. Arthritis of right acromioclavicular joint          Plan:  We discussed treatment options today. We will get her started with outpatient PT at [place] [facility] due to location convenience.  She will continue to avoid active abduction and active external rotation for another 2 weeks.  She understands it will be at least another 2 weeks before she can drive.  She will follow up in 3 weeks to check her progress.    She understands and accepts

## 2024-06-10 NOTE — PATIENT INSTRUCTIONS
Drink coffee, walk around or have a brief nap in your car if you are sleepy.  Have a 10-15 minute break after every 2 hours of driving.    8. Drive with a .  Share the driving.  Relax in the back seat until it is your time to share the driving again.       Resmed magnetic mask recall updated contraindications and warning:    Updated Contraindications1  Masks with magnetic components are contraindicated for use by patients where they, or anyone in close physical contact while using the mask, have the following:  Active medical implants that interact with magnets (i.e., pacemakers, implantable cardioverter defibrillators (ICD), neurostimulators, cerebrospinal fluid (CSF) shunts, insulin/infusion pumps)  Metallic implants/objects containing ferromagnetic material (i.e., aneurysm clips/flow disruption devices, embolic coils, stents, valves, electrodes, implants to restore hearing or balance with implanted magnets, ocular implants, metallic splinters in the eye).    Updated Warning2  Keep the mask magnets at a safe distance of at least 6 inches (150 mm) away from implants or medical devices that may be adversely affected by magnetic interference. This warning applies to you or anyone in close physical contact with your mask. The magnets are in the frame and lower headgear clips, with a magnetic field strength of up to 400mT. When worn, they connect to secure the mask but may inadvertently detach while asleep.  Implants/medical devices, including those listed within contraindications, may be adversely affected if they change function under external magnetic fields or contain ferromagnetic materials that attract/repel to magnetic fields (some metallic implants, e.g., contact lenses with metal, dental implants, metallic cranial plates, screws, lexie hole covers, and bone substitute devices). Consult your physician and  of your implant / other medical device for information on the potential adverse

## 2024-06-25 ENCOUNTER — TELEPHONE (OUTPATIENT)
Dept: ORTHOPEDIC SURGERY | Age: 54
End: 2024-06-25

## 2024-06-25 NOTE — TELEPHONE ENCOUNTER
I called and spoke with patient and she stated that she did try with no sling for one day.  She was sore with out it.  I told her  that maybe she should keep the sling on and would not drive till seeing Dr. Camacho on 7/1/2024.  Pt understands

## 2024-06-25 NOTE — TELEPHONE ENCOUNTER
PATIENT CALLED IN REQUESTING TO SPEAK WITH [employer] SHE WOULD LIKE TO KNOW WHEN SHE CAN DRIVE AND WHEN SHE CAN REMOVE HER SLING    PATIENT CAN BE REACHED [phone]

## 2024-07-01 ENCOUNTER — OFFICE VISIT (OUTPATIENT)
Age: 54
End: 2024-07-01

## 2024-07-01 VITALS — WEIGHT: 190 LBS | BODY MASS INDEX: 35.87 KG/M2 | HEIGHT: 61 IN

## 2024-07-01 DIAGNOSIS — S46.011D TRAUMATIC COMPLETE TEAR OF RIGHT ROTATOR CUFF, SUBSEQUENT ENCOUNTER: Primary | ICD-10-CM

## 2024-07-01 NOTE — PROGRESS NOTES
Arelis Smith  [id number]  Encounter Date: 7/1/2024  YOB: 1970    Chief Complaint   Patient presents with    Follow-up     Traumatic complete tear of right rotator cuff, subsequent encounter       History:Ms. Arelis Smith is here in follow up regarding her right RTC repair.  Pain is controlled and she is using her sling as prescribed.  Working with OP PT at [facility] and feels like she is making good progress.  She is 5 weeks post-op.    Exam:  Ht 1.549 m (5' 1\")   Wt 86.2 kg (190 lb)   LMP 05/06/2011   BMI 35.90 kg/m²   General: Alert and oriented x3, No acute distress, Cooperative and conversant.  Mood and affect are appropriate.  Right shoulder: Incision sites are well-healed.  She can fully extend her elbow.  Right hand  strength is intact.  In seated position she tolerates passive forward flexion to almost 90 degrees and passive abduction to 75 degrees.  Soft end feel.  Right elbow and wrist ROM are uninhibited.  Sensation to light touch grossly present throughout the right upper extremity  Capillary refill < 2 seconds and palpable distal pulses  Skin: no erythema, lesions or rashes    Assessment:   Diagnosis Orders   1. Traumatic complete tear of right rotator cuff, subsequent encounter            Plan:  We discussed treatment options today.  The first part of the rehab process and can begin weaning out of her sling.  She will continue working with physical therapy to begin gentle progressive strengthening.  She understands it will take approximately 3 months to regain the majority of her strength.  I will see her back in 4 weeks to check her progress.    She understands and accepts this course of care.      Greg Camacho MD, FAAOS  Board Certified Orthopaedic Surgeon  [facility] Orthopaedic and Sports Medicine  [place] & [place]    Phone:[phone]  Fax [phone]      Documentation was done using voice recognition dragon software.  Every effort was made to ensure

## 2024-07-25 DIAGNOSIS — E78.2 HYPERLIPIDEMIA, MIXED: ICD-10-CM

## 2024-07-25 DIAGNOSIS — F33.0 MAJOR DEPRESSIVE DISORDER, RECURRENT EPISODE, MILD (HCC): ICD-10-CM

## 2024-07-25 RX ORDER — VENLAFAXINE HYDROCHLORIDE 150 MG/1
150 CAPSULE, EXTENDED RELEASE ORAL DAILY
Qty: 90 CAPSULE | Refills: 3 | Status: SHIPPED | OUTPATIENT
Start: 2024-07-25

## 2024-07-25 RX ORDER — LOSARTAN POTASSIUM 100 MG/1
100 TABLET ORAL DAILY
Qty: 90 TABLET | Refills: 3 | Status: SHIPPED | OUTPATIENT
Start: 2024-07-25

## 2024-07-25 RX ORDER — PRAVASTATIN SODIUM 20 MG
20 TABLET ORAL DAILY
Qty: 90 TABLET | Refills: 3 | Status: SHIPPED | OUTPATIENT
Start: 2024-07-25

## 2024-07-25 NOTE — TELEPHONE ENCOUNTER
Last appointment: 5/14/2024  Next appointment: 8/15/2024  Last refill: 1028/23  And 6/28/23    Requested Prescriptions     Pending Prescriptions Disp Refills    losartan (COZAAR) 100 MG tablet 90 tablet 3     Sig: Take 1 tablet by mouth daily    venlafaxine (EFFEXOR XR) 150 MG extended release capsule 90 capsule 3     Sig: Take 1 capsule by mouth daily

## 2024-07-25 NOTE — TELEPHONE ENCOUNTER
Last appointment: 5/14/2024  Next appointment: 7/25/2024  Last refill: 5/14/24    Requested Prescriptions     Pending Prescriptions Disp Refills    pravastatin (PRAVACHOL) 20 MG tablet 90 tablet 3     Sig: Take 1 tablet by mouth daily

## 2024-07-29 ENCOUNTER — OFFICE VISIT (OUTPATIENT)
Age: 54
End: 2024-07-29

## 2024-07-29 VITALS — BODY MASS INDEX: 35.87 KG/M2 | HEIGHT: 61 IN | WEIGHT: 190 LBS

## 2024-07-29 DIAGNOSIS — M19.011 ARTHRITIS OF RIGHT ACROMIOCLAVICULAR JOINT: ICD-10-CM

## 2024-07-29 DIAGNOSIS — S46.011D TRAUMATIC COMPLETE TEAR OF RIGHT ROTATOR CUFF, SUBSEQUENT ENCOUNTER: Primary | ICD-10-CM

## 2024-07-29 PROCEDURE — 99024 POSTOP FOLLOW-UP VISIT: CPT | Performed by: ORTHOPAEDIC SURGERY

## 2024-08-15 ENCOUNTER — OFFICE VISIT (OUTPATIENT)
Dept: INTERNAL MEDICINE CLINIC | Age: 54
End: 2024-08-15
Payer: COMMERCIAL

## 2024-08-15 VITALS
SYSTOLIC BLOOD PRESSURE: 115 MMHG | DIASTOLIC BLOOD PRESSURE: 80 MMHG | WEIGHT: 193.6 LBS | HEART RATE: 80 BPM | OXYGEN SATURATION: 99 % | BODY MASS INDEX: 36.58 KG/M2

## 2024-08-15 DIAGNOSIS — Z13.1 SCREENING FOR DIABETES MELLITUS: ICD-10-CM

## 2024-08-15 DIAGNOSIS — E78.2 HYPERLIPIDEMIA, MIXED: ICD-10-CM

## 2024-08-15 DIAGNOSIS — R73.01 IMPAIRED FASTING BLOOD SUGAR: ICD-10-CM

## 2024-08-15 DIAGNOSIS — I10 BENIGN ESSENTIAL HTN: ICD-10-CM

## 2024-08-15 DIAGNOSIS — E66.01 SEVERE OBESITY (BMI 35.0-39.9) WITH COMORBIDITY (HCC): ICD-10-CM

## 2024-08-15 DIAGNOSIS — Z00.00 ENCOUNTER FOR WELL ADULT EXAM WITHOUT ABNORMAL FINDINGS: Primary | ICD-10-CM

## 2024-08-15 DIAGNOSIS — Z23 NEED FOR TDAP VACCINATION: ICD-10-CM

## 2024-08-15 DIAGNOSIS — Z00.00 ENCOUNTER FOR WELL ADULT EXAM WITHOUT ABNORMAL FINDINGS: ICD-10-CM

## 2024-08-15 LAB
ALBUMIN SERPL-MCNC: 4.3 G/DL (ref 3.4–5)
ALBUMIN/GLOB SERPL: 1.3 {RATIO} (ref 1.1–2.2)
ALP SERPL-CCNC: 74 U/L (ref 40–129)
ALT SERPL-CCNC: 28 U/L (ref 10–40)
ANION GAP SERPL CALCULATED.3IONS-SCNC: 13 MMOL/L (ref 3–16)
AST SERPL-CCNC: 27 U/L (ref 15–37)
BASOPHILS # BLD: 0.1 K/UL (ref 0–0.2)
BASOPHILS NFR BLD: 0.9 %
BILIRUB SERPL-MCNC: 0.3 MG/DL (ref 0–1)
BUN SERPL-MCNC: 11 MG/DL (ref 7–20)
CALCIUM SERPL-MCNC: 10 MG/DL (ref 8.3–10.6)
CHLORIDE SERPL-SCNC: 100 MMOL/L (ref 99–110)
CHOLEST SERPL-MCNC: 217 MG/DL (ref 0–199)
CO2 SERPL-SCNC: 27 MMOL/L (ref 21–32)
CREAT SERPL-MCNC: 0.7 MG/DL (ref 0.6–1.1)
DEPRECATED RDW RBC AUTO: 13 % (ref 12.4–15.4)
EOSINOPHIL # BLD: 0.1 K/UL (ref 0–0.6)
EOSINOPHIL NFR BLD: 1.1 %
EST. AVERAGE GLUCOSE BLD GHB EST-MCNC: 125.5 MG/DL
GFR SERPLBLD CREATININE-BSD FMLA CKD-EPI: >90 ML/MIN/{1.73_M2}
GLUCOSE SERPL-MCNC: 106 MG/DL (ref 70–99)
HBA1C MFR BLD: 6 %
HCT VFR BLD AUTO: 39.8 % (ref 36–48)
HDLC SERPL-MCNC: 50 MG/DL (ref 40–60)
HGB BLD-MCNC: 13.2 G/DL (ref 12–16)
LDLC SERPL CALC-MCNC: 132 MG/DL
LYMPHOCYTES # BLD: 3 K/UL (ref 1–5.1)
LYMPHOCYTES NFR BLD: 48.5 %
MCH RBC QN AUTO: 30 PG (ref 26–34)
MCHC RBC AUTO-ENTMCNC: 33.1 G/DL (ref 31–36)
MCV RBC AUTO: 90.7 FL (ref 80–100)
MONOCYTES # BLD: 0.4 K/UL (ref 0–1.3)
MONOCYTES NFR BLD: 6.4 %
NEUTROPHILS # BLD: 2.7 K/UL (ref 1.7–7.7)
NEUTROPHILS NFR BLD: 43.1 %
PLATELET # BLD AUTO: 243 K/UL (ref 135–450)
PMV BLD AUTO: 8.9 FL (ref 5–10.5)
POTASSIUM SERPL-SCNC: 3.8 MMOL/L (ref 3.5–5.1)
PROT SERPL-MCNC: 7.5 G/DL (ref 6.4–8.2)
RBC # BLD AUTO: 4.39 M/UL (ref 4–5.2)
SODIUM SERPL-SCNC: 140 MMOL/L (ref 136–145)
TRIGL SERPL-MCNC: 174 MG/DL (ref 0–150)
TSH SERPL DL<=0.005 MIU/L-ACNC: 0.1 UIU/ML (ref 0.27–4.2)
VLDLC SERPL CALC-MCNC: 35 MG/DL
WBC # BLD AUTO: 6.2 K/UL (ref 4–11)

## 2024-08-15 PROCEDURE — 3079F DIAST BP 80-89 MM HG: CPT | Performed by: INTERNAL MEDICINE

## 2024-08-15 PROCEDURE — 99396 PREV VISIT EST AGE 40-64: CPT | Performed by: INTERNAL MEDICINE

## 2024-08-15 PROCEDURE — 90471 IMMUNIZATION ADMIN: CPT | Performed by: INTERNAL MEDICINE

## 2024-08-15 PROCEDURE — 90715 TDAP VACCINE 7 YRS/> IM: CPT | Performed by: INTERNAL MEDICINE

## 2024-08-15 PROCEDURE — 3074F SYST BP LT 130 MM HG: CPT | Performed by: INTERNAL MEDICINE

## 2024-08-15 SDOH — ECONOMIC STABILITY: INCOME INSECURITY: HOW HARD IS IT FOR YOU TO PAY FOR THE VERY BASICS LIKE FOOD, HOUSING, MEDICAL CARE, AND HEATING?: NOT VERY HARD

## 2024-08-15 SDOH — ECONOMIC STABILITY: FOOD INSECURITY: WITHIN THE PAST 12 MONTHS, YOU WORRIED THAT YOUR FOOD WOULD RUN OUT BEFORE YOU GOT MONEY TO BUY MORE.: NEVER TRUE

## 2024-08-15 SDOH — ECONOMIC STABILITY: FOOD INSECURITY: WITHIN THE PAST 12 MONTHS, THE FOOD YOU BOUGHT JUST DIDN'T LAST AND YOU DIDN'T HAVE MONEY TO GET MORE.: NEVER TRUE

## 2024-08-15 NOTE — PROGRESS NOTES
gums, hoarseness, sore throat, dysphagia, throat infections, or dentures  RESPIRATORY:  Neg SOB ,wheeze, sputum, hemoptysis. No report of + TB test.  + Snoring.  + Witnessed apnea.  She sees Dr. Tovar.  She is on CPAP.    CARDIOVASCULAR:  Neg Chest pain, palpitations, heart murmur, dyspnea on exertion, orthopnea, paroxysmal nocturnal dyspnea or edema of extremities, or claudication.    GASTROINTESTINAL:  Neg   Nausea, vomiting, or diarrhea, constipation, hematemesis, heart burn, dysphagia,change in bowel movements or stool caliber, hematochezia, melena, abdominal pain, or food intolerance. Colonoscopy: Yes, 6/15/2018.  5-year recall.    GENITOURINARY:  Neg  Urinary frequency, hesitancy, urgency, polyuria, dysuria, hematuria, nocturia, incontinence, change in stream, genital pain or swelling, kidney stones, STD's. PAP/NAOMIE: Yes  HEMATOLOGIC/LYMPHATIC:  Neg  Anemia, bleeding dyscrasias, easy bruising, blood clots (DVT/PE), transfusions, or enlarged lymph nodes  MUSCULOSKELETAL:  Neg  New myalgias, bone pain, joint pain, joint swelling, low back pain,  Neck pain, radicular pain, or fractures.  S/P Right rotator cuff repair 5/28/2024.  She is in PT.  She sees Dr. Camacho.    NEUROLOGICAL:  Neg  Loss of Consciousness, memeory loss or forgetfulness, confusion, difficulty concentrating, seizures, insomina, aphasia or dysarthria unilateral weakness or  ataxia, syncope, tremor, or H/O head trauma.   PSYCHIATRIC:  Neg personality changes, nervousness, drug or alcohol use/abuse, H/O psych counseling: Yes, Psychotropics: Yes, Effexor XR.  Depression and anxiety well controlled.  SKIN :  Neg  Rash, nail changes, sun burns, tattoos, change in moles, or skin color changes  ENDOCRINE:  Neg  Polydipsia,polyuria,abnormal weight changes,heat /cold intolerance, Hair changes. No H/O Diabetes or Thyroid disease.           Preventive Care:    Health Maintenance   Topic Date Due    Hepatitis B vaccine (1 of 3 - 19+ 3-dose series) Never

## 2024-08-15 NOTE — PATIENT INSTRUCTIONS
Ages 18 to 65: Care Instructions  Well visits can help you stay healthy. Your doctor has checked your overall health and may have suggested ways to take good care of yourself. Your doctor also may have recommended tests. You can help prevent illness with healthy eating, good sleep, vaccinations, regular exercise, and other steps.    Get the tests that you and your doctor decide on. Depending on your age and risks, examples might include screening for diabetes; hepatitis C; HIV; and cervical, breast, lung, and colon cancer. Screening helps find diseases before any symptoms appear.   Eat healthy foods. Choose fruits, vegetables, whole grains, lean protein, and low-fat dairy foods. Limit saturated fat and reduce salt.     Limit alcohol. Men should have no more than 2 drinks a day. Women should have no more than 1. For some people, no alcohol is the best choice.   Exercise. Get at least 30 minutes of exercise on most days of the week. Walking can be a good choice.     Reach and stay at your healthy weight. This will lower your risk for many health problems.   Take care of your mental health. Try to stay connected with friends, family, and community, and find ways to manage stress.     If you're feeling depressed or hopeless, talk to someone. A counselor can help. If you don't have a counselor, talk to your doctor.   Talk to your doctor if you think you may have a problem with alcohol or drug use. This includes prescription medicines, marijuana, and other drugs.     Avoid tobacco and nicotine: Don't smoke, vape, or chew. If you need help quitting, talk to your doctor.   Practice safer sex. Getting tested, using condoms or dental dams, and limiting sex partners can help prevent STIs.     Use birth control if it's important to you to prevent pregnancy. Talk with your doctor about your choices and what might be best for you.   Prevent problems where you can. Protect your skin from too much sun, wash your hands, brush your

## 2024-08-16 ENCOUNTER — TELEPHONE (OUTPATIENT)
Dept: ORTHOPEDIC SURGERY | Age: 54
End: 2024-08-16

## 2024-08-16 NOTE — TELEPHONE ENCOUNTER
General Question     Subject: WORK  Patient and /or Facility Request: PATIENT CALLED STATES THAT SHE WOULD LIKE TO START A [employer]OB WANTS TO KNOW IF SHE HAS ANY WEIGHT BEARING RESTRICTIONS IF SO HOW MUCH OR HOW LONG. PLS CALL TO ADVISE  Contact Number: [phone]

## 2024-09-03 NOTE — PROGRESS NOTES
have to lift overhead or lift more than 25 pounds.  She will continue with her physical therapy protocol and follow back with me in 6 weeks for final check.    She understands and accepts this course of care.      Greg Camacho MD, FAAOS  Board Certified Orthopaedic Surgeon  [facility] Orthopaedic and Sports Medicine  [place] & [place]    Phone:[phone]  Fax [phone]    Documentation was done using voice recognition dragon software.  Every effort was made to ensure accuracy; however, inadvertent  Unintentional computerized transcription errors may be present.

## 2024-09-07 DIAGNOSIS — I10 BENIGN ESSENTIAL HTN: ICD-10-CM

## 2024-09-09 ENCOUNTER — OFFICE VISIT (OUTPATIENT)
Age: 54
End: 2024-09-09
Payer: COMMERCIAL

## 2024-09-09 VITALS — HEIGHT: 61 IN | WEIGHT: 190 LBS | BODY MASS INDEX: 35.87 KG/M2

## 2024-09-09 DIAGNOSIS — M19.011 ARTHRITIS OF RIGHT ACROMIOCLAVICULAR JOINT: ICD-10-CM

## 2024-09-09 DIAGNOSIS — S46.011D TRAUMATIC COMPLETE TEAR OF RIGHT ROTATOR CUFF, SUBSEQUENT ENCOUNTER: Primary | ICD-10-CM

## 2024-09-09 PROCEDURE — 99212 OFFICE O/P EST SF 10 MIN: CPT | Performed by: ORTHOPAEDIC SURGERY

## 2024-09-09 RX ORDER — AMLODIPINE BESYLATE 2.5 MG/1
2.5 TABLET ORAL DAILY
Qty: 90 TABLET | Refills: 3 | Status: SHIPPED | OUTPATIENT
Start: 2024-09-09 | End: 2024-09-10 | Stop reason: SDUPTHER

## 2024-09-09 RX ORDER — AMLODIPINE BESYLATE 2.5 MG/1
2.5 TABLET ORAL DAILY
Qty: 30 TABLET | Refills: 0 | OUTPATIENT
Start: 2024-09-09

## 2024-09-10 RX ORDER — AMLODIPINE BESYLATE 2.5 MG/1
2.5 TABLET ORAL DAILY
Qty: 14 TABLET | Refills: 0 | Status: SHIPPED | OUTPATIENT
Start: 2024-09-10

## 2024-10-25 ENCOUNTER — TELEPHONE (OUTPATIENT)
Dept: INTERNAL MEDICINE CLINIC | Age: 54
End: 2024-10-25

## 2024-10-25 NOTE — TELEPHONE ENCOUNTER
Patient is calling in for  in regards to active sx of upset stomach, cramping, pressure in the groin area. Per patient she normally has uneasy bowel s when eating certain food so she tries to limit them and takes OTC medication for them. Patient stated she had gone to the restroom and when wiping she noticed light pink color in the tissue when wiping. Per patient she does have a hx of hemorrhoids that is why she would like to know if there is anything she should look out for since it is the weekend.     Offered patient an appointment for Monday and declined due to wanting msg to be sent first for guidance.     Please advise.

## 2024-10-25 NOTE — TELEPHONE ENCOUNTER
Increasing abdominal pain, nausea, vomiting, ongoing bleeding.  The symptoms should prompt her to go to the emergency department.

## 2024-10-30 DIAGNOSIS — E05.90 SUBCLINICAL HYPERTHYROIDISM: ICD-10-CM

## 2024-10-30 LAB
T3FREE SERPL-MCNC: 3.4 PG/ML (ref 2.3–4.2)
T4 FREE SERPL-MCNC: 0.8 NG/DL (ref 0.9–1.8)
TSH SERPL DL<=0.005 MIU/L-ACNC: 0.26 UIU/ML (ref 0.27–4.2)

## 2024-10-31 ENCOUNTER — TELEPHONE (OUTPATIENT)
Dept: ENDOCRINOLOGY | Age: 54
End: 2024-10-31

## 2024-10-31 DIAGNOSIS — E05.90 HYPERTHYROIDISM: Primary | ICD-10-CM

## 2024-10-31 NOTE — TELEPHONE ENCOUNTER
Patient advised.    Patient also asked if you could reorder lab that tests her hormones, she stated that the lab  back in April

## 2024-11-04 ENCOUNTER — TELEPHONE (OUTPATIENT)
Dept: ORTHOPEDIC SURGERY | Age: 54
End: 2024-11-04

## 2024-11-04 NOTE — TELEPHONE ENCOUNTER
General Question     Subject: METAL IN BODY?  Patient and /or Facility Request: Arelis Smith   Contact Number: [phone]     THE PT IS TRYING TO FIND OUT IF SHE HAS ANY METAL IN HER BODY FROM THE SX WITH DR LOMBARDO.  SHE NEEDS TO HAVE AN MRI OF HER PITUITARY.  SHE SAID YOU CAN LEAVE A VM AT THE ABOVE PHONE #

## 2024-11-04 NOTE — TELEPHONE ENCOUNTER
Called patient, LVARSEN    - Attempting to inform her that she does not have any metal in her shoulder and she is ok to have her Pituitary Gland looked at.

## 2024-11-04 NOTE — TELEPHONE ENCOUNTER
Arelis called back in, I did relay the info to her from [place] that she does not have any metal in her shoulder.  She understood and does not have any further questions.

## 2024-11-06 ENCOUNTER — TELEPHONE (OUTPATIENT)
Dept: ENDOCRINOLOGY | Age: 54
End: 2024-11-06

## 2024-11-06 NOTE — TELEPHONE ENCOUNTER
Patient states  labs from 10/17/23 were different from labs done on 10/30/24.  She states that she is going to have MRI done tomorrow and wanted to get other labs completed also.

## 2024-11-06 NOTE — TELEPHONE ENCOUNTER
I reviewed again. Current labs have TSH, T4 and T3 which was needed. Last lab IGF-1 was also checked, does not need it repeated, will discuss also at visit.

## 2024-11-06 NOTE — TELEPHONE ENCOUNTER
Call from pt stating that her lab orders from 10/2023 has   Pt is wanting to know if Dr. Roldan would like to reorder those labs     Please advise   CB# [phone]

## 2024-11-07 ENCOUNTER — HOSPITAL ENCOUNTER (OUTPATIENT)
Age: 54
Discharge: HOME | End: 2024-11-07
Payer: COMMERCIAL

## 2024-11-07 DIAGNOSIS — E05.90 HYPERTHYROIDISM: ICD-10-CM

## 2024-11-07 PROCEDURE — 6360000004 HC RX CONTRAST MEDICATION: Performed by: INTERNAL MEDICINE

## 2024-11-07 PROCEDURE — A9579 GAD-BASE MR CONTRAST NOS,1ML: HCPCS | Performed by: INTERNAL MEDICINE

## 2024-11-07 PROCEDURE — 70553 MRI BRAIN STEM W/O & W/DYE: CPT | Performed by: INTERNAL MEDICINE

## 2024-11-07 RX ADMIN — GADOTERIDOL 18 ML: 279.3 INJECTION, SOLUTION INTRAVENOUS at 08:38

## 2024-11-11 LAB
EGFR, POC: >90 ML/MIN/1.73M2
POC CREATININE: 0.5 MG/DL (ref 0.6–1.1)

## 2024-11-12 ENCOUNTER — OFFICE VISIT (OUTPATIENT)
Dept: ENDOCRINOLOGY | Age: 54
End: 2024-11-12
Payer: COMMERCIAL

## 2024-11-12 VITALS
HEART RATE: 100 BPM | DIASTOLIC BLOOD PRESSURE: 81 MMHG | WEIGHT: 192 LBS | OXYGEN SATURATION: 98 % | RESPIRATION RATE: 16 BRPM | SYSTOLIC BLOOD PRESSURE: 128 MMHG | BODY MASS INDEX: 36.28 KG/M2

## 2024-11-12 DIAGNOSIS — E03.9 ACQUIRED HYPOTHYROIDISM: Primary | ICD-10-CM

## 2024-11-12 PROCEDURE — 99214 OFFICE O/P EST MOD 30 MIN: CPT | Performed by: INTERNAL MEDICINE

## 2024-11-12 PROCEDURE — 3074F SYST BP LT 130 MM HG: CPT | Performed by: INTERNAL MEDICINE

## 2024-11-12 PROCEDURE — 3079F DIAST BP 80-89 MM HG: CPT | Performed by: INTERNAL MEDICINE

## 2024-11-12 RX ORDER — LEVOTHYROXINE SODIUM 25 UG/1
25 TABLET ORAL DAILY
Qty: 30 TABLET | Refills: 4 | Status: SHIPPED | OUTPATIENT
Start: 2024-11-12

## 2024-11-12 RX ORDER — LEVOTHYROXINE SODIUM 25 UG/1
25 TABLET ORAL DAILY
Qty: 90 TABLET | Refills: 1 | Status: SHIPPED | OUTPATIENT
Start: 2024-11-12 | End: 2024-11-12 | Stop reason: SDUPTHER

## 2024-11-12 NOTE — PROGRESS NOTES
or  cavernous sinuses.  2. Mild nonspecific subcortical white matter FLAIR signal abnormality presumably  related to mild chronic small vessel ischemic disease and/or mild age-related  degenerative change.         Past Medical History:   Diagnosis Date    Allergic rhinitis     Anxiety     Caesarean Section     7/10/94    Depression     well controlled    Dysplastic nevus 2010    Graves' disease 2023    Hyperlipidemia     Hypertension     Hyperthyroidism 2020    Impaired fasting glucose     Obesity     SAVANAH on CPAP 2024     Past Surgical History:   Procedure Laterality Date     SECTION      COLONOSCOPY  06/15/2018    Dr. Montalvo    EYE SURGERY      FRACTURE SURGERY  24    Right Rotator Cuff    HYSTERECTOMY, VAGINAL  2013    partial    LASIK  2015    Dr. Butts    SHOULDER ARTHROSCOPY Right 2024    Right Arthroscopic Rotator Cuff Repair with tapestryt patch AND Arthroscopic Acromioclavicular Joint Resection performed by Greg Camacho MD at [facility] OR    SKIN BIOPSY      WISDOM TOOTH EXTRACTION       Current Outpatient Medications   Medication Sig Dispense Refill    amLODIPine (NORVASC) 2.5 MG tablet Take 1 tablet by mouth daily 14 tablet 0    pravastatin (PRAVACHOL) 20 MG tablet Take 1 tablet by mouth daily 90 tablet 3    losartan (COZAAR) 100 MG tablet Take 1 tablet by mouth daily 90 tablet 3    venlafaxine (EFFEXOR XR) 150 MG extended release capsule Take 1 capsule by mouth daily 90 capsule 3    cetirizine (ZYRTEC) 10 MG tablet Take 1 tablet by mouth daily      potassium chloride (KLOR-CON M20) 20 MEQ extended release tablet TAKE 1 TABLET TWICE A  tablet 3    hydroCHLOROthiazide (HYDRODIURIL) 25 MG tablet TAKE 1 TABLET DAILY 90 tablet 5     No current facility-administered medications for this visit.     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

## 2024-11-14 DIAGNOSIS — E03.9 ACQUIRED HYPOTHYROIDISM: ICD-10-CM

## 2024-11-15 LAB
ACTH PLAS-MCNC: 7 PG/ML (ref 7–63)
CORTIS AM PEAK SERPL-MCNC: 9.9 UG/DL (ref 4.3–22.4)
ESTRADIOL SERPL-MCNC: 11 PG/ML
FSH SERPL-ACNC: 43.9 MIU/ML
LH SERPL-ACNC: 31.7 MIU/ML
PROLACTIN SERPL IA-MCNC: 8.6 NG/ML

## 2024-12-02 DIAGNOSIS — I10 BENIGN ESSENTIAL HTN: Primary | ICD-10-CM

## 2024-12-02 RX ORDER — HYDROCHLOROTHIAZIDE 25 MG/1
TABLET ORAL
Qty: 90 TABLET | Refills: 3 | Status: SHIPPED | OUTPATIENT
Start: 2024-12-02

## 2024-12-02 NOTE — TELEPHONE ENCOUNTER
Last appointment: 8/15/2024  Next appointment: 2/17/2025  Last refill: 12/11/23  Requested Prescriptions     Pending Prescriptions Disp Refills    hydroCHLOROthiazide (HYDRODIURIL) 25 MG tablet [Pharmacy Med Name: HYDROCHLOROTHIAZIDE 25MG TABS] 90 tablet 4     Sig: TAKE 1 TABLET DAILY

## 2024-12-07 ASSESSMENT — SLEEP AND FATIGUE QUESTIONNAIRES
HOW LIKELY ARE YOU TO NOD OFF OR FALL ASLEEP WHILE SITTING QUIETLY AFTER LUNCH WITHOUT ALCOHOL: SLIGHT CHANCE OF DOZING
HOW LIKELY ARE YOU TO NOD OFF OR FALL ASLEEP WHILE WATCHING TV: SLIGHT CHANCE OF DOZING
HOW LIKELY ARE YOU TO NOD OFF OR FALL ASLEEP WHILE WATCHING TV: SLIGHT CHANCE OF DOZING
HOW LIKELY ARE YOU TO NOD OFF OR FALL ASLEEP WHILE SITTING INACTIVE IN A PUBLIC PLACE: SLIGHT CHANCE OF DOZING
ESS TOTAL SCORE: 9
HOW LIKELY ARE YOU TO NOD OFF OR FALL ASLEEP WHILE SITTING AND READING: HIGH CHANCE OF DOZING
HOW LIKELY ARE YOU TO NOD OFF OR FALL ASLEEP WHILE SITTING INACTIVE IN A PUBLIC PLACE: SLIGHT CHANCE OF DOZING
HOW LIKELY ARE YOU TO NOD OFF OR FALL ASLEEP WHILE SITTING AND TALKING TO SOMEONE: WOULD NEVER DOZE
HOW LIKELY ARE YOU TO NOD OFF OR FALL ASLEEP IN A CAR, WHILE STOPPED FOR A FEW MINUTES IN TRAFFIC: WOULD NEVER DOZE
HOW LIKELY ARE YOU TO NOD OFF OR FALL ASLEEP WHILE SITTING AND TALKING TO SOMEONE: WOULD NEVER DOZE
HOW LIKELY ARE YOU TO NOD OFF OR FALL ASLEEP WHEN YOU ARE A PASSENGER IN A CAR FOR AN HOUR WITHOUT A BREAK: WOULD NEVER DOZE
HOW LIKELY ARE YOU TO NOD OFF OR FALL ASLEEP WHILE SITTING QUIETLY AFTER LUNCH WITHOUT ALCOHOL: SLIGHT CHANCE OF DOZING
HOW LIKELY ARE YOU TO NOD OFF OR FALL ASLEEP WHILE SITTING AND READING: HIGH CHANCE OF DOZING
HOW LIKELY ARE YOU TO NOD OFF OR FALL ASLEEP WHEN YOU ARE A PASSENGER IN A CAR FOR AN HOUR WITHOUT A BREAK: WOULD NEVER DOZE
HOW LIKELY ARE YOU TO NOD OFF OR FALL ASLEEP WHILE LYING DOWN TO REST IN THE AFTERNOON WHEN CIRCUMSTANCES PERMIT: HIGH CHANCE OF DOZING
HOW LIKELY ARE YOU TO NOD OFF OR FALL ASLEEP WHILE LYING DOWN TO REST IN THE AFTERNOON WHEN CIRCUMSTANCES PERMIT: HIGH CHANCE OF DOZING
HOW LIKELY ARE YOU TO NOD OFF OR FALL ASLEEP IN A CAR, WHILE STOPPED FOR A FEW MINUTES IN TRAFFIC: WOULD NEVER DOZE

## 2024-12-10 ENCOUNTER — OFFICE VISIT (OUTPATIENT)
Age: 54
End: 2024-12-10
Payer: COMMERCIAL

## 2024-12-10 VITALS
BODY MASS INDEX: 36.67 KG/M2 | DIASTOLIC BLOOD PRESSURE: 80 MMHG | OXYGEN SATURATION: 97 % | WEIGHT: 194.22 LBS | HEART RATE: 103 BPM | SYSTOLIC BLOOD PRESSURE: 124 MMHG | HEIGHT: 61 IN

## 2024-12-10 DIAGNOSIS — I10 BENIGN ESSENTIAL HTN: ICD-10-CM

## 2024-12-10 DIAGNOSIS — E66.9 OBESITY (BMI 30-39.9): ICD-10-CM

## 2024-12-10 DIAGNOSIS — G47.33 OSA ON CPAP: Primary | ICD-10-CM

## 2024-12-10 PROCEDURE — 3074F SYST BP LT 130 MM HG: CPT | Performed by: STUDENT IN AN ORGANIZED HEALTH CARE EDUCATION/TRAINING PROGRAM

## 2024-12-10 PROCEDURE — 3079F DIAST BP 80-89 MM HG: CPT | Performed by: STUDENT IN AN ORGANIZED HEALTH CARE EDUCATION/TRAINING PROGRAM

## 2024-12-10 PROCEDURE — G2211 COMPLEX E/M VISIT ADD ON: HCPCS | Performed by: STUDENT IN AN ORGANIZED HEALTH CARE EDUCATION/TRAINING PROGRAM

## 2024-12-10 PROCEDURE — 99214 OFFICE O/P EST MOD 30 MIN: CPT | Performed by: STUDENT IN AN ORGANIZED HEALTH CARE EDUCATION/TRAINING PROGRAM

## 2024-12-10 NOTE — PROGRESS NOTES
Ordering Provider:   Ariel Tovar MD - Diplomate, [facility]  [address] [place] [address], [place] [address]    Phone [phone]  Fax [phone]    Diagnosis: [x] SAVANAH  (G47.33) [] CSA (G47.31) []  Other:__________________   Length of Need: [] 13 months [x]  99 Months                                          []  Other:__________________   Machine (SUE): [] Respironics Auto (with modem for remote monitoring)       [] Other:____________________    []  ResMed Auto (with modem for remote monitoring)    []  CPAP () [] Bilevel ()   Mode: Mode:   [] Auto [] Fixed [] Auto [] Spontaneous   Pmin:_________cmH2O      Pmax:_________cmH2O   P:_________cmH2O    EPAPmin:__________cmH2O IPAP:__________cmH2O     IPAPmax:__________cmH2O EPAP:__________cmH2O     PSmin:_______  PSmax:_______       (ResMed) PS:_________     Flex/EPR - 3 full time                          Ramp time: 30 min Flex/EPR - 3 full time                 Ramp time: 30 min   Ramp Pressure:___________cmH2O Ramp Pressure:____________cmH2O         Humidifier: [x] Heated ()                                               [] Passive     [x] Water chamber replacement ()/ 1 per 6 months   Mask:   [x] Nasal () /1 per 3 months [] Full Face () /1 per 3 months   [x] Patient choice -Size and fit mask [] Patient Choice - Size and fit mask   [x] Dispense: nasal cushion  [] Dispense:  [] Dispense:    [x] Headgear () / 1 per 3 months [] Headgear () / 1 per 3 months   [x] Replacement Nasal Cushion ()/2 per month [] Interface Replacement ()/1 per month   [] Replacement Nasal Pillows ()/2 per month       Tubing: [x] Heated ()/1 per 3 months                           [] Standard ()/1 per 3 months  [] Other:____________________   Filters: [x] Non-disposable ()/1 per 6 months                 [x] Ultra-Fine, Disposable ()/2 per month     Miscellaneous: [x] Chin Strap

## 2024-12-10 NOTE — PROGRESS NOTES
[facility]  Sleep Medicine  [address], [address]  [place] [address]    Chief Complaint   Patient presents with    Sleep Apnea         Arelis Smith comes in today for sleep apnea follow-up . She was diagnosed with moderate obstructive sleep apnea and is being treated with PAP therapy.   She has been on PAP therapy for several months.  She states she wears the PAP device every night.     She falls asleep in  few minutes.  She awakens a couple of times per night (usually due to joint pain). The average total amount of sleep is about 7.5-8 hours per night and takes no naps. She does not use sleep aid/s. Symptoms of sleep apnea have improved since using PAP therapy.  She is not snoring with the machine on.  She denies any complaints of too high pressure, hunger for air, dry mouth / nose, mask fit / discomfort issues, low air humidity, high air humidity, and temperature issues.  She does complain of  air leaking towards the eyes .   DME: [facility]  Mask: Airfit N20  Machine: ResMed Airsense 11 Autoset        DATA REVIEWED TODAY:     Diagnostic Review  HST on 3/25/2024 showed respiratory event index of 26.0/h with oxygen desaturation down to a cornelio of 84%.     Humble           12/7/2024     9:26 AM 6/7/2024    10:03 AM 3/11/2024     4:14 PM   Sleep Medicine   Sitting and reading 3 3 3   Watching TV 1 1 1   Sitting, inactive in a public place (e.g. a theatre or a meeting) 1 0 1   As a passenger in a car for an hour without a break 0 1 0   Lying down to rest in the afternoon when circumstances permit 3 2 1   Sitting and talking to someone 0 0 0   Sitting quietly after a lunch without alcohol 1 1 0   In a car, while stopped for a few minutes in traffic 0 0 0   Humble Sleepiness Score 9 8 6   Neck (Inches)   16       PAP Adherence (dates: 9/5/2024 - 12/3/2024)  Total days used: 89/90  % used days >= 4 hours: 99%  Average hours used per day: 8 hrs 39 mins  Mode: auto

## 2024-12-10 NOTE — PATIENT INSTRUCTIONS
SAVANAH education:    You have obstructive sleep apnea (SAVANAH):    1. Obstructive sleep apnea (SAVANAH) is a condition where the upper airway narrows or closes intermittently during sleep. This can lead to drops in your oxygen levels during sleep, arousals from sleep, and excessive daytime sleepiness.     2. Untreated SAVANAH is associated with increased risk of hypertension, cardiac disease, myocardial infarction, stroke, and poor blood sugar control. Treating your SAVANAH can decrease these risks.    3. Weight loss does improve sleep apnea. It is important to have a healthy diet and an exercise program.     4. Alcohol and sedating medicine can make SAVANAH worse and should be avoided in particular before sleep.    5. If you have surgery or are hospitalized, tell your doctor that you have SAVANAH and bring your CPAP to the hospital.    6. If you are drowsy or sleepy, you should not drive. If you are driving and become drowsy or sleepy, you should pull over to a safe place. Below are our drowsy driving tips.     PAP machine care:   You should clean your PAP regularly (see your PAP  site for more details)  Daily cleaning   1. Wipe mask with a damp towel with mild detergent, rinse with a damp towel and let air dry. You can also see CPAP cleaning wipes. Avoid harsh cleaning wipes or chemicals.    2. Humidifier- empty water daily. Fill with clean distilled water before use before sleep.    Weekly Cleaning   1. Clean mask, headgear, and tubing weekly  2. Fill your sink with warm water and a few drops of mild dish soap. Swirl equipment for at least 5 minutes. Rinse well and air dry. Hang hose over something so the water droplets drip out.   3. Clean filter- rinse with warm water, squeeze excess water and blot dry with towel. If there is a white filter (respironics machine)- do not wash that - it is disposable and should be changed once a month.   4. Humidifier- Disinfect in solution that is one part vinegar and 5 parts water for 30

## 2025-01-18 ENCOUNTER — PATIENT MESSAGE (OUTPATIENT)
Dept: INTERNAL MEDICINE CLINIC | Age: 55
End: 2025-01-18

## 2025-01-18 DIAGNOSIS — I10 BENIGN ESSENTIAL HTN: ICD-10-CM

## 2025-01-20 RX ORDER — AMLODIPINE BESYLATE 2.5 MG/1
2.5 TABLET ORAL DAILY
Qty: 90 TABLET | Refills: 3 | Status: SHIPPED | OUTPATIENT
Start: 2025-01-20

## 2025-02-17 ENCOUNTER — OFFICE VISIT (OUTPATIENT)
Dept: INTERNAL MEDICINE CLINIC | Age: 55
End: 2025-02-17

## 2025-02-17 VITALS
OXYGEN SATURATION: 99 % | BODY MASS INDEX: 35.71 KG/M2 | SYSTOLIC BLOOD PRESSURE: 126 MMHG | DIASTOLIC BLOOD PRESSURE: 88 MMHG | HEART RATE: 100 BPM | WEIGHT: 189 LBS

## 2025-02-17 DIAGNOSIS — E78.2 HYPERLIPIDEMIA, MIXED: ICD-10-CM

## 2025-02-17 DIAGNOSIS — E03.9 ACQUIRED HYPOTHYROIDISM: ICD-10-CM

## 2025-02-17 DIAGNOSIS — R73.01 IMPAIRED FASTING BLOOD SUGAR: ICD-10-CM

## 2025-02-17 DIAGNOSIS — I10 BENIGN ESSENTIAL HTN: Primary | ICD-10-CM

## 2025-02-17 DIAGNOSIS — E05.00 GRAVES' DISEASE: ICD-10-CM

## 2025-02-17 DIAGNOSIS — Z23 NEED FOR PNEUMOCOCCAL VACCINATION: ICD-10-CM

## 2025-02-17 DIAGNOSIS — I10 BENIGN ESSENTIAL HTN: ICD-10-CM

## 2025-02-17 DIAGNOSIS — Z23 FLU VACCINE NEED: ICD-10-CM

## 2025-02-17 DIAGNOSIS — R00.0 TACHYCARDIA: ICD-10-CM

## 2025-02-17 SDOH — ECONOMIC STABILITY: FOOD INSECURITY: WITHIN THE PAST 12 MONTHS, THE FOOD YOU BOUGHT JUST DIDN'T LAST AND YOU DIDN'T HAVE MONEY TO GET MORE.: NEVER TRUE

## 2025-02-17 SDOH — ECONOMIC STABILITY: FOOD INSECURITY: WITHIN THE PAST 12 MONTHS, YOU WORRIED THAT YOUR FOOD WOULD RUN OUT BEFORE YOU GOT MONEY TO BUY MORE.: NEVER TRUE

## 2025-02-17 ASSESSMENT — ANXIETY QUESTIONNAIRES
4. TROUBLE RELAXING: NOT AT ALL
1. FEELING NERVOUS, ANXIOUS, OR ON EDGE: NOT AT ALL
5. BEING SO RESTLESS THAT IT IS HARD TO SIT STILL: NOT AT ALL
IF YOU CHECKED OFF ANY PROBLEMS ON THIS QUESTIONNAIRE, HOW DIFFICULT HAVE THESE PROBLEMS MADE IT FOR YOU TO DO YOUR WORK, TAKE CARE OF THINGS AT HOME, OR GET ALONG WITH OTHER PEOPLE: NOT DIFFICULT AT ALL
7. FEELING AFRAID AS IF SOMETHING AWFUL MIGHT HAPPEN: NOT AT ALL
3. WORRYING TOO MUCH ABOUT DIFFERENT THINGS: NOT AT ALL
6. BECOMING EASILY ANNOYED OR IRRITABLE: NOT AT ALL

## 2025-02-17 ASSESSMENT — PATIENT HEALTH QUESTIONNAIRE - PHQ9
SUM OF ALL RESPONSES TO PHQ9 QUESTIONS 1 & 2: 0
7. TROUBLE CONCENTRATING ON THINGS, SUCH AS READING THE NEWSPAPER OR WATCHING TELEVISION: NOT AT ALL
SUM OF ALL RESPONSES TO PHQ QUESTIONS 1-9: 0
9. THOUGHTS THAT YOU WOULD BE BETTER OFF DEAD, OR OF HURTING YOURSELF: NOT AT ALL
5. POOR APPETITE OR OVEREATING: NOT AT ALL
SUM OF ALL RESPONSES TO PHQ QUESTIONS 1-9: 0
1. LITTLE INTEREST OR PLEASURE IN DOING THINGS: NOT AT ALL
4. FEELING TIRED OR HAVING LITTLE ENERGY: NOT AT ALL
3. TROUBLE FALLING OR STAYING ASLEEP: NOT AT ALL
SUM OF ALL RESPONSES TO PHQ QUESTIONS 1-9: 0
SUM OF ALL RESPONSES TO PHQ QUESTIONS 1-9: 0
8. MOVING OR SPEAKING SO SLOWLY THAT OTHER PEOPLE COULD HAVE NOTICED. OR THE OPPOSITE, BEING SO FIGETY OR RESTLESS THAT YOU HAVE BEEN MOVING AROUND A LOT MORE THAN USUAL: NOT AT ALL
10. IF YOU CHECKED OFF ANY PROBLEMS, HOW DIFFICULT HAVE THESE PROBLEMS MADE IT FOR YOU TO DO YOUR WORK, TAKE CARE OF THINGS AT HOME, OR GET ALONG WITH OTHER PEOPLE: NOT DIFFICULT AT ALL
2. FEELING DOWN, DEPRESSED OR HOPELESS: NOT AT ALL
6. FEELING BAD ABOUT YOURSELF - OR THAT YOU ARE A FAILURE OR HAVE LET YOURSELF OR YOUR FAMILY DOWN: NOT AT ALL

## 2025-02-17 NOTE — PROGRESS NOTES
[facility]  Internal Medicine  Patient Encounter  Jb Trejo D.O., [facility]        Chief Complaint   Patient presents with    6 Month Follow-Up     6 month f/u for chronic medical problems       HPI: 54 y.o. female seen today for her routine checkup regarding status of current issues as below along with a medication review and reconciliation.    Patient has several medical problems including but not limited to hypertension, hyperlipidemia, impaired fasting glucose, depression, Graves' disease.    Maintenance items overdue:  COVID-19 vaccine  Flu vaccine  Hepatitis B vaccine  Prevnar 20 vaccine    Also, she has additional complaints of pain at the base of the left thumb.  Onset 1-2 weeks ago. Pain is worse in the AM.  The thumb can pop in the AM.     She denies CP, SOB, palpitations, swelling. She denies abd pain, N/V/D, blood in the stool.   She can feel palpitations at times.    She has an appointment with Dr. Roldan tomorrow. She has lab orders in hand.     Home Bp-- 163/98.  She does not have her BP monitor.      Last TSH was 0.26.  Pt had original been on Methimazole.  FT4 has also been low normal. She is on Levothyroxine.  She is being considered as having central hypothyroidism.          Past Medical History:   Diagnosis Date    Allergic rhinitis     Anxiety     Caesarean Section     7/10/94    Depression     well controlled    Dysplastic nevus 07/21/2010    Graves' disease 08/08/2023    Hyperlipidemia     Hypertension     Hyperthyroidism 2020    Impaired fasting glucose     Obesity     SAVANAH on CPAP 03/25/2024         MEDICATIONS:  Prior to Visit Medications    Medication Sig   amLODIPine (NORVASC) 2.5 MG tablet Take 1 tablet by mouth daily   hydroCHLOROthiazide (HYDRODIURIL) 25 MG tablet TAKE 1 TABLET DAILY   levothyroxine (SYNTHROID) 25 MCG tablet Take 1 tablet by mouth daily   pravastatin (PRAVACHOL) 20 MG tablet Take 1 tablet by mouth daily   losartan (COZAAR) 100 MG tablet Take 1 tablet by  None known

## 2025-02-18 ENCOUNTER — OFFICE VISIT (OUTPATIENT)
Dept: ENDOCRINOLOGY | Age: 55
End: 2025-02-18
Payer: COMMERCIAL

## 2025-02-18 VITALS
HEART RATE: 100 BPM | BODY MASS INDEX: 36.28 KG/M2 | WEIGHT: 192 LBS | DIASTOLIC BLOOD PRESSURE: 72 MMHG | RESPIRATION RATE: 14 BRPM | SYSTOLIC BLOOD PRESSURE: 117 MMHG | OXYGEN SATURATION: 98 %

## 2025-02-18 DIAGNOSIS — E87.6 HYPOKALEMIA: ICD-10-CM

## 2025-02-18 DIAGNOSIS — E03.9 ACQUIRED HYPOTHYROIDISM: Primary | ICD-10-CM

## 2025-02-18 DIAGNOSIS — D72.820 LYMPHOCYTOSIS: Primary | ICD-10-CM

## 2025-02-18 LAB
ALBUMIN SERPL-MCNC: 4.4 G/DL (ref 3.4–5)
ALBUMIN/GLOB SERPL: 1.3 {RATIO} (ref 1.1–2.2)
ALP SERPL-CCNC: 70 U/L (ref 40–129)
ALT SERPL-CCNC: 33 U/L (ref 10–40)
ANION GAP SERPL CALCULATED.3IONS-SCNC: 14 MMOL/L (ref 3–16)
AST SERPL-CCNC: 32 U/L (ref 15–37)
BASOPHILS # BLD: 0 K/UL (ref 0–0.2)
BASOPHILS NFR BLD: 0 %
BILIRUB SERPL-MCNC: 0.4 MG/DL (ref 0–1)
BUN SERPL-MCNC: 8 MG/DL (ref 7–20)
CALCIUM SERPL-MCNC: 10.1 MG/DL (ref 8.3–10.6)
CHLORIDE SERPL-SCNC: 100 MMOL/L (ref 99–110)
CHOLEST SERPL-MCNC: 198 MG/DL (ref 0–199)
CO2 SERPL-SCNC: 26 MMOL/L (ref 21–32)
CREAT SERPL-MCNC: 0.7 MG/DL (ref 0.6–1.1)
DEPRECATED RDW RBC AUTO: 12.7 % (ref 12.4–15.4)
EOSINOPHIL # BLD: 0.1 K/UL (ref 0–0.6)
EOSINOPHIL NFR BLD: 1 %
EST. AVERAGE GLUCOSE BLD GHB EST-MCNC: 122.6 MG/DL
GFR SERPLBLD CREATININE-BSD FMLA CKD-EPI: >90 ML/MIN/{1.73_M2}
GLUCOSE SERPL-MCNC: 156 MG/DL (ref 70–99)
HBA1C MFR BLD: 5.9 %
HCT VFR BLD AUTO: 40.2 % (ref 36–48)
HDLC SERPL-MCNC: 45 MG/DL (ref 40–60)
HGB BLD-MCNC: 13.7 G/DL (ref 12–16)
LDLC SERPL CALC-MCNC: 113 MG/DL
LYMPHOCYTES # BLD: 5.4 K/UL (ref 1–5.1)
LYMPHOCYTES NFR BLD: 64 %
MCH RBC QN AUTO: 30.3 PG (ref 26–34)
MCHC RBC AUTO-ENTMCNC: 34 G/DL (ref 31–36)
MCV RBC AUTO: 89 FL (ref 80–100)
MONOCYTES # BLD: 0.7 K/UL (ref 0–1.3)
MONOCYTES NFR BLD: 9 %
NEUTROPHILS # BLD: 1.8 K/UL (ref 1.7–7.7)
NEUTROPHILS NFR BLD: 19 %
NEUTS BAND NFR BLD MANUAL: 3 % (ref 0–7)
PATH INTERP BLD-IMP: NORMAL
PATH INTERP BLD-IMP: YES
PLATELET # BLD AUTO: 236 K/UL (ref 135–450)
PLATELET BLD QL SMEAR: ADEQUATE
PMV BLD AUTO: 9.7 FL (ref 5–10.5)
POTASSIUM SERPL-SCNC: 3.3 MMOL/L (ref 3.5–5.1)
PROT SERPL-MCNC: 7.7 G/DL (ref 6.4–8.2)
RBC # BLD AUTO: 4.52 M/UL (ref 4–5.2)
SODIUM SERPL-SCNC: 140 MMOL/L (ref 136–145)
T3FREE SERPL-MCNC: 4 PG/ML (ref 2.3–4.2)
T4 FREE SERPL-MCNC: 1.2 NG/DL (ref 0.9–1.8)
TRIGL SERPL-MCNC: 200 MG/DL (ref 0–150)
TSH SERPL DL<=0.005 MIU/L-ACNC: <0.01 UIU/ML (ref 0.27–4.2)
VARIANT LYMPHS NFR BLD MANUAL: 4 % (ref 0–6)
VLDLC SERPL CALC-MCNC: 40 MG/DL
WBC # BLD AUTO: 8 K/UL (ref 4–11)

## 2025-02-18 PROCEDURE — 3074F SYST BP LT 130 MM HG: CPT | Performed by: INTERNAL MEDICINE

## 2025-02-18 PROCEDURE — 99214 OFFICE O/P EST MOD 30 MIN: CPT | Performed by: INTERNAL MEDICINE

## 2025-02-18 PROCEDURE — 3078F DIAST BP <80 MM HG: CPT | Performed by: INTERNAL MEDICINE

## 2025-02-18 NOTE — PROGRESS NOTES
Subjective:      49 y/o WF who is here for thyroid evaluation.     Referred by Dr. Jb Trejo    Interim:     On levothyroxine  Occasional palpitations  Not more on levothyroxine    Fatigue better  CPAP helped    Mom Dx with colon cancer  Passed away 12/22    occasional palpitations  Improved with tapazole  Lost weight intentionally    Reports some blurred vision  Did not see  Dr. Dubon    She was diagnosed with hyperthyroidism    Had work up done    No neck pain, fever chills    11/20 TSH 0.007 FT3 4.97  FT4 1.28  TSI 1.57  10/20 TSH 0.09  10/19 TSH 1.35    12/20 Thyroid scan:     FINDINGS:    THYROID APPEARANCE:  Normal size and configuration with normal homogenous uptake   4 HOUR THYROID IODINE UPTAKE:  12.9% ( normal is 6-18%)   24 HOUR THYROID IODINE UPTAKE:  33.2% (normal is 10-35%)     Normal USG    Initial complaints: headache, palpitations, reports fast heart rate , memory fog, tremors, weight loss 14 lb, heat intolerance    Symptoms for a few weeks  Palpitations for months  No eye symptoms  History of obstructive symptoms:  difficulty swallowing no    History of radiation to patient's neck: no  Recent iodine exposure: no  Family history includes Mom Hashimoto's hypothyroidism/ maternal aunt  Family history of thyroid cancer: no    TSH <0.01 FT4 1.8 FT3 7.8  Trab -ve  2/21  TSH <0.01 T3 1.72 FT3 4.2 Tapazole 10mg    4/21  TSH 0.2 FT4 0.8  Tapazole 10mg    5/21 TSH 3.21 FT4 0.6 FT3 2.5   8/21 TSH 9.63 FT4 0.7  On 7.5mg  12/21 TSH 1.98 FT4 0.8   On 2.5mg  4/22 TSH 0.21 FT4 0.9 FT3  3.8  Off tapazole  8/22 TSH 0.05 FT4 0.8 FT3 2.9  tapazole 5mg  8/22 TSH 2.36 FT4 0.7  FT3 2.7  2/23 TSH 1.28 FT4 0.7 FT3 2.5  on tapazole 2.5mg  6/23 TSH 0.35 FT4 0.9 FT3 3.3  off tapazole  10/23 TSH 0.28   1/24 TSH 0.28  4/24 TSH 0.08 IGF-1 normal  10/24 TSH 0.26  FT4 0.8 FT3 3.4   2/25 TSH <0.01 FT4 1.2 FT3 4.0    She has HTN, takes losartan and HCTZ    She has a h/o hysterectomy  14 years ago    Feels like hot

## 2025-02-20 ENCOUNTER — TELEPHONE (OUTPATIENT)
Dept: INTERNAL MEDICINE CLINIC | Age: 55
End: 2025-02-20

## 2025-02-20 NOTE — TELEPHONE ENCOUNTER
REVIEWED ALL WITH PATIENT IN GREAT DETAIL.  SHE WILL RTC I THE NEXT WEEK FOR REPEAT LABS WHICH HAVE BEEN ORDERED.

## 2025-02-20 NOTE — TELEPHONE ENCOUNTER
Pt called in for Dr. Trejo or Alyssa in regards to asking questions on Neil Rivera notes on current labs, needs understanding and clarification. Please advise, pt would like a callback.     Callback# [phone]

## 2025-02-24 DIAGNOSIS — D72.820 LYMPHOCYTOSIS: ICD-10-CM

## 2025-02-24 DIAGNOSIS — E87.6 HYPOKALEMIA: ICD-10-CM

## 2025-02-24 LAB
ANION GAP SERPL CALCULATED.3IONS-SCNC: 12 MMOL/L (ref 3–16)
BASOPHILS # BLD: 0.1 K/UL (ref 0–0.2)
BASOPHILS NFR BLD: 1.1 %
BUN SERPL-MCNC: 10 MG/DL (ref 7–20)
CALCIUM SERPL-MCNC: 9.9 MG/DL (ref 8.3–10.6)
CHLORIDE SERPL-SCNC: 104 MMOL/L (ref 99–110)
CO2 SERPL-SCNC: 27 MMOL/L (ref 21–32)
CREAT SERPL-MCNC: 0.7 MG/DL (ref 0.6–1.1)
DEPRECATED RDW RBC AUTO: 12.7 % (ref 12.4–15.4)
EOSINOPHIL # BLD: 0.1 K/UL (ref 0–0.6)
EOSINOPHIL NFR BLD: 1.4 %
GFR SERPLBLD CREATININE-BSD FMLA CKD-EPI: >90 ML/MIN/{1.73_M2}
GLUCOSE SERPL-MCNC: 107 MG/DL (ref 70–99)
HCT VFR BLD AUTO: 40 % (ref 36–48)
HGB BLD-MCNC: 13.4 G/DL (ref 12–16)
LYMPHOCYTES # BLD: 3 K/UL (ref 1–5.1)
LYMPHOCYTES NFR BLD: 46.7 %
MAGNESIUM SERPL-MCNC: 2.14 MG/DL (ref 1.8–2.4)
MCH RBC QN AUTO: 30 PG (ref 26–34)
MCHC RBC AUTO-ENTMCNC: 33.4 G/DL (ref 31–36)
MCV RBC AUTO: 89.7 FL (ref 80–100)
MONOCYTES # BLD: 0.5 K/UL (ref 0–1.3)
MONOCYTES NFR BLD: 8.4 %
NEUTROPHILS # BLD: 2.7 K/UL (ref 1.7–7.7)
NEUTROPHILS NFR BLD: 42.4 %
PLATELET # BLD AUTO: 216 K/UL (ref 135–450)
PMV BLD AUTO: 9.3 FL (ref 5–10.5)
POTASSIUM SERPL-SCNC: 4.2 MMOL/L (ref 3.5–5.1)
RBC # BLD AUTO: 4.46 M/UL (ref 4–5.2)
SODIUM SERPL-SCNC: 143 MMOL/L (ref 136–145)
WBC # BLD AUTO: 6.5 K/UL (ref 4–11)

## 2025-03-15 DIAGNOSIS — I10 BENIGN ESSENTIAL HTN: ICD-10-CM

## 2025-03-15 RX ORDER — POTASSIUM CHLORIDE 1500 MG/1
20 TABLET, EXTENDED RELEASE ORAL 2 TIMES DAILY
Qty: 180 TABLET | Refills: 3 | Status: SHIPPED | OUTPATIENT
Start: 2025-03-15

## 2025-05-29 ENCOUNTER — TELEPHONE (OUTPATIENT)
Dept: ENDOCRINOLOGY | Age: 55
End: 2025-05-29

## 2025-05-29 DIAGNOSIS — E03.9 ACQUIRED HYPOTHYROIDISM: ICD-10-CM

## 2025-05-29 LAB
T3 SERPL-MCNC: 1.35 NG/ML (ref 0.8–2)
T4 FREE SERPL-MCNC: 0.8 NG/DL (ref 0.9–1.8)
TSH SERPL DL<=0.005 MIU/L-ACNC: 0.11 UIU/ML (ref 0.27–4.2)

## 2025-06-02 ENCOUNTER — OFFICE VISIT (OUTPATIENT)
Dept: ENDOCRINOLOGY | Age: 55
End: 2025-06-02
Payer: COMMERCIAL

## 2025-06-02 VITALS
DIASTOLIC BLOOD PRESSURE: 81 MMHG | BODY MASS INDEX: 36.47 KG/M2 | SYSTOLIC BLOOD PRESSURE: 129 MMHG | WEIGHT: 193 LBS | HEART RATE: 91 BPM

## 2025-06-02 DIAGNOSIS — E03.9 ACQUIRED HYPOTHYROIDISM: Primary | ICD-10-CM

## 2025-06-02 PROCEDURE — 3074F SYST BP LT 130 MM HG: CPT | Performed by: INTERNAL MEDICINE

## 2025-06-02 PROCEDURE — 3079F DIAST BP 80-89 MM HG: CPT | Performed by: INTERNAL MEDICINE

## 2025-06-02 PROCEDURE — 99213 OFFICE O/P EST LOW 20 MIN: CPT | Performed by: INTERNAL MEDICINE

## 2025-06-02 NOTE — PROGRESS NOTES
Subjective:      49 y/o WF who is here for thyroid evaluation.     Referred by Dr. Jb Trejo    Interim:     Off levothyroxine  Skipped beat  Feels like neck gland enlarged    Fatigue better  CPAP helped    Mom Dx with colon cancer  Passed away 12/22    occasional palpitations  Improved with tapazole  Lost weight intentionally    Reports some blurred vision  Did not see  Dr. Dubon    She was diagnosed with hyperthyroidism    Had work up done    No neck pain, fever chills    11/20 TSH 0.007 FT3 4.97  FT4 1.28  TSI 1.57  10/20 TSH 0.09  10/19 TSH 1.35    12/20 Thyroid scan:     FINDINGS:    THYROID APPEARANCE:  Normal size and configuration with normal homogenous uptake   4 HOUR THYROID IODINE UPTAKE:  12.9% ( normal is 6-18%)   24 HOUR THYROID IODINE UPTAKE:  33.2% (normal is 10-35%)     Normal USG    Initial complaints: headache, palpitations, reports fast heart rate , memory fog, tremors, weight loss 14 lb, heat intolerance    Symptoms for a few weeks  Palpitations for months  No eye symptoms  History of obstructive symptoms:  difficulty swallowing no    History of radiation to patient's neck: no  Recent iodine exposure: no  Family history includes Mom Hashimoto's hypothyroidism/ maternal aunt  Family history of thyroid cancer: no    TSH <0.01 FT4 1.8 FT3 7.8  Trab -ve  2/21  TSH <0.01 T3 1.72 FT3 4.2 Tapazole 10mg    4/21  TSH 0.2 FT4 0.8  Tapazole 10mg    5/21 TSH 3.21 FT4 0.6 FT3 2.5   8/21 TSH 9.63 FT4 0.7  On 7.5mg  12/21 TSH 1.98 FT4 0.8   On 2.5mg  4/22 TSH 0.21 FT4 0.9 FT3  3.8  Off tapazole  8/22 TSH 0.05 FT4 0.8 FT3 2.9  tapazole 5mg  8/22 TSH 2.36 FT4 0.7  FT3 2.7  2/23 TSH 1.28 FT4 0.7 FT3 2.5  on tapazole 2.5mg  6/23 TSH 0.35 FT4 0.9 FT3 3.3  off tapazole  10/23 TSH 0.28   1/24 TSH 0.28  4/24 TSH 0.08 IGF-1 normal  10/24 TSH 0.26  FT4 0.8 FT3 3.4   2/25 TSH <0.01 FT4 1.2 FT3 4.0    She has HTN, takes losartan and HCTZ    She has a h/o hysterectomy  14 years ago    Feels like hot

## 2025-06-12 DIAGNOSIS — E78.2 HYPERLIPIDEMIA, MIXED: ICD-10-CM

## 2025-06-12 RX ORDER — PRAVASTATIN SODIUM 20 MG
20 TABLET ORAL DAILY
Qty: 90 TABLET | Refills: 3 | Status: SHIPPED | OUTPATIENT
Start: 2025-06-12

## 2025-06-12 NOTE — TELEPHONE ENCOUNTER
Last appointment: 2/17/2025  Next appointment: 8/18/2025        Last refill: (7/25/2024) by Jb Trejo DO

## 2025-06-24 RX ORDER — LOSARTAN POTASSIUM 100 MG/1
100 TABLET ORAL DAILY
Qty: 90 TABLET | Refills: 1 | Status: SHIPPED | OUTPATIENT
Start: 2025-06-24

## 2025-07-16 ENCOUNTER — HOSPITAL ENCOUNTER (OUTPATIENT)
Age: 55
Discharge: HOME | End: 2025-07-16
Attending: INTERNAL MEDICINE
Payer: COMMERCIAL

## 2025-07-16 ENCOUNTER — OFFICE VISIT (OUTPATIENT)
Dept: INTERNAL MEDICINE CLINIC | Age: 55
End: 2025-07-16

## 2025-07-16 VITALS
OXYGEN SATURATION: 98 % | WEIGHT: 190 LBS | SYSTOLIC BLOOD PRESSURE: 116 MMHG | DIASTOLIC BLOOD PRESSURE: 72 MMHG | HEART RATE: 87 BPM | BODY MASS INDEX: 35.9 KG/M2 | TEMPERATURE: 97.4 F

## 2025-07-16 DIAGNOSIS — R00.2 PALPITATIONS: ICD-10-CM

## 2025-07-16 DIAGNOSIS — R58 BLOOD ON TOILET PAPER: ICD-10-CM

## 2025-07-16 DIAGNOSIS — R10.9 LEFT SIDED ABDOMINAL PAIN: ICD-10-CM

## 2025-07-16 DIAGNOSIS — F43.23 ADJUSTMENT DISORDER WITH MIXED ANXIETY AND DEPRESSED MOOD: ICD-10-CM

## 2025-07-16 DIAGNOSIS — E05.00 GRAVES' DISEASE: ICD-10-CM

## 2025-07-16 DIAGNOSIS — R10.9 LEFT SIDED ABDOMINAL PAIN: Primary | ICD-10-CM

## 2025-07-16 DIAGNOSIS — E66.01 MORBID (SEVERE) OBESITY DUE TO EXCESS CALORIES (HCC): ICD-10-CM

## 2025-07-16 LAB
ANION GAP SERPL CALCULATED.3IONS-SCNC: 13 MMOL/L (ref 3–16)
BASOPHILS # BLD: 0.1 K/UL (ref 0–0.2)
BASOPHILS NFR BLD: 0.8 %
BILIRUBIN, POC: NORMAL
BLOOD URINE, POC: NORMAL
BUN SERPL-MCNC: 7 MG/DL (ref 7–20)
CALCIUM SERPL-MCNC: 9.9 MG/DL (ref 8.3–10.6)
CHLORIDE SERPL-SCNC: 99 MMOL/L (ref 99–110)
CLARITY, POC: CLEAR
CO2 SERPL-SCNC: 27 MMOL/L (ref 21–32)
COLOR, POC: YELLOW
CREAT SERPL-MCNC: 0.6 MG/DL (ref 0.6–1.1)
CRP SERPL-MCNC: 5.8 MG/L (ref 0–5.1)
DEPRECATED RDW RBC AUTO: 12.7 % (ref 12.4–15.4)
EOSINOPHIL # BLD: 0.1 K/UL (ref 0–0.6)
EOSINOPHIL NFR BLD: 0.8 %
ERYTHROCYTE [SEDIMENTATION RATE] IN BLOOD BY WESTERGREN METHOD: 61 MM/HR (ref 0–30)
GFR SERPLBLD CREATININE-BSD FMLA CKD-EPI: >90 ML/MIN/{1.73_M2}
GLUCOSE SERPL-MCNC: 100 MG/DL (ref 70–99)
GLUCOSE URINE, POC: NORMAL MG/DL
HCT VFR BLD AUTO: 38.7 % (ref 36–48)
HGB BLD-MCNC: 13.3 G/DL (ref 12–16)
KETONES, POC: NORMAL MG/DL
LEUKOCYTE EST, POC: NORMAL
LIPASE SERPL-CCNC: 27 U/L (ref 13–60)
LYMPHOCYTES # BLD: 3.6 K/UL (ref 1–5.1)
LYMPHOCYTES NFR BLD: 47.4 %
MCH RBC QN AUTO: 30.3 PG (ref 26–34)
MCHC RBC AUTO-ENTMCNC: 34.5 G/DL (ref 31–36)
MCV RBC AUTO: 87.9 FL (ref 80–100)
MONOCYTES # BLD: 0.5 K/UL (ref 0–1.3)
MONOCYTES NFR BLD: 6.7 %
NEUTROPHILS # BLD: 3.4 K/UL (ref 1.7–7.7)
NEUTROPHILS NFR BLD: 44.3 %
NITRITE, POC: NORMAL
PH, POC: 8.5
PLATELET # BLD AUTO: 204 K/UL (ref 135–450)
PMV BLD AUTO: 8.7 FL (ref 5–10.5)
POTASSIUM SERPL-SCNC: 3.5 MMOL/L (ref 3.5–5.1)
PROTEIN, POC: NORMAL MG/DL
RBC # BLD AUTO: 4.4 M/UL (ref 4–5.2)
SODIUM SERPL-SCNC: 139 MMOL/L (ref 136–145)
SPECIFIC GRAVITY, POC: 1
UROBILINOGEN, POC: NORMAL MG/DL
WBC # BLD AUTO: 7.6 K/UL (ref 4–11)

## 2025-07-16 PROCEDURE — 74177 CT ABD & PELVIS W/CONTRAST: CPT

## 2025-07-16 PROCEDURE — 6360000004 HC RX CONTRAST MEDICATION: Performed by: INTERNAL MEDICINE

## 2025-07-16 RX ORDER — IOPAMIDOL 755 MG/ML
75 INJECTION, SOLUTION INTRAVASCULAR
Status: COMPLETED | OUTPATIENT
Start: 2025-07-16 | End: 2025-07-16

## 2025-07-16 RX ORDER — M-VIT,TX,IRON,MINS/CALC/FOLIC 27MG-0.4MG
1 TABLET ORAL DAILY
COMMUNITY

## 2025-07-16 RX ORDER — DIATRIZOATE MEGLUMINE AND DIATRIZOATE SODIUM 660; 100 MG/ML; MG/ML
30 SOLUTION ORAL; RECTAL
Status: DISCONTINUED | OUTPATIENT
Start: 2025-07-16 | End: 2025-07-17 | Stop reason: HOSPADM

## 2025-07-16 RX ADMIN — IOPAMIDOL 75 ML: 755 INJECTION, SOLUTION INTRAVENOUS at 15:48

## 2025-07-16 RX ADMIN — DIATRIZOATE MEGLUMINE AND DIATRIZOATE SODIUM 30 ML: 660; 100 LIQUID ORAL; RECTAL at 15:48

## 2025-07-16 ASSESSMENT — PATIENT HEALTH QUESTIONNAIRE - PHQ9
2. FEELING DOWN, DEPRESSED OR HOPELESS: MORE THAN HALF THE DAYS
9. THOUGHTS THAT YOU WOULD BE BETTER OFF DEAD, OR OF HURTING YOURSELF: NOT AT ALL
7. TROUBLE CONCENTRATING ON THINGS, SUCH AS READING THE NEWSPAPER OR WATCHING TELEVISION: MORE THAN HALF THE DAYS
SUM OF ALL RESPONSES TO PHQ QUESTIONS 1-9: 8
6. FEELING BAD ABOUT YOURSELF - OR THAT YOU ARE A FAILURE OR HAVE LET YOURSELF OR YOUR FAMILY DOWN: SEVERAL DAYS
8. MOVING OR SPEAKING SO SLOWLY THAT OTHER PEOPLE COULD HAVE NOTICED. OR THE OPPOSITE, BEING SO FIGETY OR RESTLESS THAT YOU HAVE BEEN MOVING AROUND A LOT MORE THAN USUAL: NOT AT ALL
SUM OF ALL RESPONSES TO PHQ QUESTIONS 1-9: 8
5. POOR APPETITE OR OVEREATING: NOT AT ALL
SUM OF ALL RESPONSES TO PHQ QUESTIONS 1-9: 8
10. IF YOU CHECKED OFF ANY PROBLEMS, HOW DIFFICULT HAVE THESE PROBLEMS MADE IT FOR YOU TO DO YOUR WORK, TAKE CARE OF THINGS AT HOME, OR GET ALONG WITH OTHER PEOPLE: VERY DIFFICULT
3. TROUBLE FALLING OR STAYING ASLEEP: NOT AT ALL
1. LITTLE INTEREST OR PLEASURE IN DOING THINGS: MORE THAN HALF THE DAYS
SUM OF ALL RESPONSES TO PHQ QUESTIONS 1-9: 8
4. FEELING TIRED OR HAVING LITTLE ENERGY: SEVERAL DAYS

## 2025-07-16 NOTE — PATIENT INSTRUCTIONS
To do list:    #1 your palpitations need further evaluation.  I would recommend a 30-day event cardiac monitor.  Depending on cost, you can also look into getting a Hybrid [employer] off of [employer].  This allows you to acquire an EKG when you are having symptoms.    #2 if your palpitations return and do not resolve, go to the emergency department.    #3 we may need to increase your Effexor XR to 225 mg daily to address increased anxiety and depression.

## 2025-08-11 DIAGNOSIS — F33.0 MAJOR DEPRESSIVE DISORDER, RECURRENT EPISODE, MILD: ICD-10-CM

## 2025-08-11 RX ORDER — LOSARTAN POTASSIUM 100 MG/1
100 TABLET ORAL DAILY
Qty: 90 TABLET | Refills: 3 | Status: SHIPPED | OUTPATIENT
Start: 2025-08-11

## 2025-08-11 RX ORDER — VENLAFAXINE HYDROCHLORIDE 150 MG/1
150 CAPSULE, EXTENDED RELEASE ORAL DAILY
Qty: 90 CAPSULE | Refills: 3 | Status: SHIPPED | OUTPATIENT
Start: 2025-08-11

## 2025-08-27 ENCOUNTER — PATIENT MESSAGE (OUTPATIENT)
Dept: INTERNAL MEDICINE CLINIC | Age: 55
End: 2025-08-27

## (undated) DEVICE — GOWN,NON-REINFORCED,LARGE: Brand: MEDLINE

## (undated) DEVICE — T-MAX DISPOSABLE FACE MASK 8 PER BOX

## (undated) DEVICE — PENCIL ES L3M BTTN SWCH S STL HEX LOK BLDE ELECTRD HOLSTER

## (undated) DEVICE — NEEDLE SPNL L3.5IN PNK HUB S STL REG WALL FIT STYL W/ QNCKE

## (undated) DEVICE — Device

## (undated) DEVICE — GAUZE,SPONGE,4"X4",8PLY,STRL,LF,10/TRAY: Brand: MEDLINE

## (undated) DEVICE — ELECTRODE PT RET AD L9FT HI MOIST COND ADH HYDRGEL CORDED

## (undated) DEVICE — STANDARD HYPODERMIC NEEDLE,POLYPROPYLENE HUB: Brand: MONOJECT

## (undated) DEVICE — GLOVE SURG SZ 85 L12IN FNGR THK79MIL GRN LTX FREE

## (undated) DEVICE — GLOVE SURG SZ 85 L12IN FNGR THK94MIL STD WHT LTX FREE

## (undated) DEVICE — PAD ABSRB W8XL10IN ABD HYDROPHOBIC NONWOVEN THCK LAYR CELOS

## (undated) DEVICE — NEEDLE SCORPION HD MEGA LOADER

## (undated) DEVICE — STRIP,CLOSURE,WOUND,MEDI-STRIP,1/2X4: Brand: MEDLINE

## (undated) DEVICE — COVER,MAYO STAND,STERILE: Brand: MEDLINE

## (undated) DEVICE — 3M™ STERI-DRAPE™ U-DRAPE 1067 1067 5/BX 4BX/CS/CTN&#X20;: Brand: STERI-DRAPE™

## (undated) DEVICE — CANNULA ARTHSCP L7CM ID575MM CRYS SMOOTH W OBT

## (undated) DEVICE — GOWN,AURORA,NONREINF,RAGLAN,XXL,STERILE: Brand: MEDLINE

## (undated) DEVICE — GOWN SIRUS NONREIN XL W/TWL: Brand: MEDLINE INDUSTRIES, INC.

## (undated) DEVICE — 4-PORT MANIFOLD: Brand: NEPTUNE 2

## (undated) DEVICE — BANDAGE COMPR W6INXL10YD ST M E WHITE/BEIGE

## (undated) DEVICE — LIGHT HANDLE: Brand: DEVON

## (undated) DEVICE — NEEDLE PRECISIONGUIDE 27X 3/8 1ML LS 200 S/C SYR 309659

## (undated) DEVICE — DRAPE,U/ SHT,SPLIT,PLAS,STERIL: Brand: MEDLINE

## (undated) DEVICE — CANNULA ARTHSCP L7CM DIA7MM TRNSLUC THRD FLX W/ NO SQUIRT

## (undated) DEVICE — APPLICATOR MEDICATED 26 CC SOLUTION HI LT ORNG CHLORAPREP

## (undated) DEVICE — CANNULA ARTHSCP L7CM ID8.25MM TRNSLUC THRD FLX W/ NO SQUIRT

## (undated) DEVICE — SYRINGE MED 5ML STD CLR PLAS LUERLOCK TIP N CTRL DISP

## (undated) DEVICE — CONTAINER,SPECIMEN,OR STERILE,4OZ: Brand: MEDLINE

## (undated) DEVICE — SUTURE VICRYL + SZ 2-0 L36IN ABSRB UD L36MM CT-1 1/2 CIR VCP945H

## (undated) DEVICE — INTENDED FOR TISSUE SEPARATION, AND OTHER PROCEDURES THAT REQUIRE A SHARP SURGICAL BLADE TO PUNCTURE OR CUT.: Brand: BARD-PARKER ® STAINLESS STEEL BLADES

## (undated) DEVICE — 3M™ IOBAN™ 2 ANTIMICROBIAL INCISE DRAPE 6650EZ: Brand: IOBAN™ 2

## (undated) DEVICE — PACK PROCEDURE SURG SHLDR MFFOP CUST

## (undated) DEVICE — SHEET,DRAPE,53X77,STERILE: Brand: MEDLINE

## (undated) DEVICE — SYRINGE,CONTROL,LL,FINGER,GRIP: Brand: MEDLINE INDUSTRIES, INC.

## (undated) DEVICE — TUBING, SUCTION, 3/16" X 20', STRAIGHT: Brand: MEDLINE

## (undated) DEVICE — SYRINGE IRRIG 60ML SFT PLIABLE BLB EZ TO GRP 1 HND USE W/

## (undated) DEVICE — FLUID TRAP FOR MINIVAC ES EQUIP FLD TRAP